# Patient Record
Sex: FEMALE | Race: WHITE | NOT HISPANIC OR LATINO | Employment: UNEMPLOYED | URBAN - METROPOLITAN AREA
[De-identification: names, ages, dates, MRNs, and addresses within clinical notes are randomized per-mention and may not be internally consistent; named-entity substitution may affect disease eponyms.]

---

## 2023-08-11 ENCOUNTER — HOSPITAL ENCOUNTER (OUTPATIENT)
Dept: DATA CONVERSION | Facility: HOSPITAL | Age: 88
Discharge: HOME | End: 2023-08-11

## 2023-08-11 DIAGNOSIS — E11.9 TYPE 2 DIABETES MELLITUS WITHOUT COMPLICATIONS (MULTI): ICD-10-CM

## 2023-08-11 DIAGNOSIS — M25.551 PAIN IN RIGHT HIP: ICD-10-CM

## 2023-08-11 DIAGNOSIS — K52.9 NONINFECTIVE GASTROENTERITIS AND COLITIS, UNSPECIFIED: ICD-10-CM

## 2023-08-11 DIAGNOSIS — E78.5 HYPERLIPIDEMIA, UNSPECIFIED: ICD-10-CM

## 2023-08-11 DIAGNOSIS — M19.90 UNSPECIFIED OSTEOARTHRITIS, UNSPECIFIED SITE: ICD-10-CM

## 2023-08-11 DIAGNOSIS — I10 ESSENTIAL (PRIMARY) HYPERTENSION: ICD-10-CM

## 2023-08-11 LAB
ALBUMIN SERPL-MCNC: 3.8 GM/DL (ref 3.5–5)
ALBUMIN/GLOB SERPL: 1.2 RATIO (ref 1.5–3)
ALP BLD-CCNC: 56 U/L (ref 35–125)
ALT SERPL-CCNC: 18 U/L (ref 5–40)
ANION GAP SERPL CALCULATED.3IONS-SCNC: 10 MMOL/L (ref 0–19)
APPEARANCE PLAS: CLEAR
AST SERPL-CCNC: 21 U/L (ref 5–40)
BILIRUB SERPL-MCNC: 0.3 MG/DL (ref 0.1–1.2)
BUN SERPL-MCNC: 22 MG/DL (ref 8–25)
BUN/CREAT SERPL: 27.5 RATIO (ref 8–21)
CALCIUM SERPL-MCNC: 9.3 MG/DL (ref 8.5–10.4)
CHLORIDE SERPL-SCNC: 106 MMOL/L (ref 97–107)
CHOLEST SERPL-MCNC: 189 MG/DL (ref 133–200)
CHOLEST/HDLC SERPL: 3 RATIO
CO2 SERPL-SCNC: 25 MMOL/L (ref 24–31)
COLOR SPUN FLD: YELLOW
CREAT SERPL-MCNC: 0.8 MG/DL (ref 0.4–1.6)
DEPRECATED RDW RBC AUTO: 58 FL (ref 37–54)
ERYTHROCYTE [DISTWIDTH] IN BLOOD BY AUTOMATED COUNT: 15.8 % (ref 11.7–15)
FASTING STATUS PATIENT QL REPORTED: NORMAL
GFR SERPL CREATININE-BSD FRML MDRD: 72 ML/MIN/1.73 M2
GLOBULIN SER-MCNC: 3.1 G/DL (ref 1.9–3.7)
GLUCOSE SERPL-MCNC: 138 MG/DL (ref 65–99)
HBA1C MFR BLD: 5.8 % (ref 4–6)
HCT VFR BLD AUTO: 37.6 % (ref 36–44)
HDLC SERPL-MCNC: 63 MG/DL
HGB BLD-MCNC: 12 GM/DL (ref 12–15)
LDLC SERPL CALC-MCNC: 103 MG/DL (ref 65–130)
MCH RBC QN AUTO: 31.7 PG (ref 26–34)
MCHC RBC AUTO-ENTMCNC: 31.9 % (ref 31–37)
MCV RBC AUTO: 99.2 FL (ref 80–100)
NRBC BLD-RTO: 0 /100 WBC
PLATELET # BLD AUTO: 149 K/UL (ref 150–450)
PMV BLD AUTO: 10 CU (ref 7–12.6)
POTASSIUM SERPL-SCNC: 4.6 MMOL/L (ref 3.4–5.1)
PROT SERPL-MCNC: 6.9 G/DL (ref 5.9–7.9)
RBC # BLD AUTO: 3.79 M/UL (ref 4–4.9)
SODIUM SERPL-SCNC: 141 MMOL/L (ref 133–145)
TRIGL SERPL-MCNC: 113 MG/DL (ref 40–150)
TSH SERPL DL<=0.05 MIU/L-ACNC: 3.14 MIU/L (ref 0.27–4.2)
WBC # BLD AUTO: 4.9 K/UL (ref 4.5–11)

## 2023-08-30 ENCOUNTER — HOSPITAL ENCOUNTER (OUTPATIENT)
Dept: DATA CONVERSION | Facility: HOSPITAL | Age: 88
Discharge: HOME | End: 2023-08-30

## 2023-08-30 DIAGNOSIS — T14.8XXA OTHER INJURY OF UNSPECIFIED BODY REGION, INITIAL ENCOUNTER: ICD-10-CM

## 2023-09-01 ENCOUNTER — HOSPITAL ENCOUNTER (OUTPATIENT)
Dept: DATA CONVERSION | Facility: HOSPITAL | Age: 88
Discharge: HOME | End: 2023-09-01
Payer: MEDICARE

## 2023-09-01 DIAGNOSIS — M48.062 SPINAL STENOSIS, LUMBAR REGION WITH NEUROGENIC CLAUDICATION: ICD-10-CM

## 2023-09-01 DIAGNOSIS — M16.11 UNILATERAL PRIMARY OSTEOARTHRITIS, RIGHT HIP: ICD-10-CM

## 2023-09-01 LAB — GLUCOSE BLD STRIP.AUTO-MCNC: 115 MG/DL (ref 65–99)

## 2023-09-15 PROBLEM — G89.29 OTHER CHRONIC PAIN: Status: ACTIVE | Noted: 2023-09-15

## 2023-09-15 PROBLEM — M19.91 PRIMARY OSTEOARTHRITIS: Status: ACTIVE | Noted: 2023-09-15

## 2023-09-15 PROBLEM — K58.0 IRRITABLE BOWEL SYNDROME WITH DIARRHEA: Status: ACTIVE | Noted: 2023-09-15

## 2023-09-15 PROBLEM — M51.369 DDD (DEGENERATIVE DISC DISEASE), LUMBAR: Status: ACTIVE | Noted: 2023-09-15

## 2023-09-15 PROBLEM — M54.40 ACUTE BACK PAIN WITH SCIATICA: Status: ACTIVE | Noted: 2023-09-15

## 2023-09-15 PROBLEM — M1A.00X0 IDIOPATHIC CHRONIC GOUT WITHOUT TOPHUS: Status: ACTIVE | Noted: 2023-09-15

## 2023-09-15 PROBLEM — M43.16 SPONDYLOLISTHESIS AT L3-L4 LEVEL: Status: ACTIVE | Noted: 2023-09-15

## 2023-09-15 PROBLEM — E78.5 HYPERLIPIDEMIA: Status: ACTIVE | Noted: 2023-09-15

## 2023-09-15 PROBLEM — M15.4 EROSIVE OSTEOARTHRITIS OF BOTH HANDS: Status: ACTIVE | Noted: 2023-09-15

## 2023-09-15 PROBLEM — M51.36 DDD (DEGENERATIVE DISC DISEASE), LUMBAR: Status: ACTIVE | Noted: 2023-09-15

## 2023-09-15 PROBLEM — M19.041 PRIMARY OSTEOARTHRITIS, RIGHT HAND: Status: ACTIVE | Noted: 2023-09-15

## 2023-09-15 PROBLEM — L89.152 PRESSURE INJURY OF SACRAL REGION, STAGE 2 (MULTI): Status: ACTIVE | Noted: 2023-09-15

## 2023-09-15 PROBLEM — M06.9 RHEUMATOID ARTHRITIS (MULTI): Status: ACTIVE | Noted: 2023-09-15

## 2023-09-15 PROBLEM — L03.317 CELLULITIS OF BUTTOCK: Status: RESOLVED | Noted: 2023-09-15 | Resolved: 2023-09-15

## 2023-09-15 PROBLEM — E55.9 VITAMIN D DEFICIENCY: Status: ACTIVE | Noted: 2023-09-15

## 2023-09-15 PROBLEM — L03.90 CELLULITIS: Status: ACTIVE | Noted: 2023-09-15

## 2023-09-15 PROBLEM — L89.153: Status: ACTIVE | Noted: 2023-09-15

## 2023-09-15 PROBLEM — E11.9 TYPE 2 DIABETES MELLITUS WITHOUT COMPLICATION, WITHOUT LONG-TERM CURRENT USE OF INSULIN (MULTI): Status: ACTIVE | Noted: 2023-09-15

## 2023-09-15 PROBLEM — M48.062 SPINAL STENOSIS, LUMBAR REGION WITH NEUROGENIC CLAUDICATION: Status: ACTIVE | Noted: 2023-09-15

## 2023-09-15 PROBLEM — M54.31 SCIATICA OF RIGHT SIDE: Status: ACTIVE | Noted: 2023-09-15

## 2023-09-15 PROBLEM — F41.9 ANXIETY: Status: ACTIVE | Noted: 2023-09-15

## 2023-09-15 PROBLEM — M47.812 SPONDYLOSIS WITHOUT MYELOPATHY OR RADICULOPATHY, CERVICAL REGION: Status: ACTIVE | Noted: 2023-09-15

## 2023-09-15 PROBLEM — L03.012 CELLULITIS OF FINGER OF LEFT HAND: Status: RESOLVED | Noted: 2023-09-15 | Resolved: 2023-09-15

## 2023-09-15 PROBLEM — G54.1 LUMBOSACRAL PLEXOPATHY: Status: ACTIVE | Noted: 2023-09-15

## 2023-09-15 PROBLEM — M19.049 HAND ARTHRITIS: Status: ACTIVE | Noted: 2023-09-15

## 2023-09-15 PROBLEM — I10 ESSENTIAL HYPERTENSION: Status: ACTIVE | Noted: 2023-09-15

## 2023-09-15 PROBLEM — F32.9 REACTIVE DEPRESSION: Status: ACTIVE | Noted: 2023-09-15

## 2023-09-15 PROBLEM — M47.817 SPONDYLOSIS WITHOUT MYELOPATHY OR RADICULOPATHY, LUMBOSACRAL REGION: Status: ACTIVE | Noted: 2023-09-15

## 2023-09-15 PROBLEM — E73.9 LACTOSE INTOLERANCE: Status: ACTIVE | Noted: 2023-09-15

## 2023-09-15 PROBLEM — M50.30 DDD (DEGENERATIVE DISC DISEASE), CERVICAL: Status: ACTIVE | Noted: 2023-09-15

## 2023-09-15 PROBLEM — M35.9 SYSTEMIC INVOLVEMENT OF CONNECTIVE TISSUE, UNSPECIFIED (MULTI): Status: ACTIVE | Noted: 2023-09-15

## 2023-09-15 PROBLEM — M46.1 BILATERAL SACROILIITIS (CMS-HCC): Status: ACTIVE | Noted: 2023-09-15

## 2023-09-15 RX ORDER — BUSPIRONE HYDROCHLORIDE 15 MG/1
1 TABLET ORAL 2 TIMES DAILY
COMMUNITY
End: 2023-12-20 | Stop reason: SDUPTHER

## 2023-09-15 RX ORDER — EZETIMIBE 10 MG/1
1 TABLET ORAL DAILY
COMMUNITY
End: 2024-04-15 | Stop reason: SDUPTHER

## 2023-09-15 RX ORDER — LOSARTAN POTASSIUM 50 MG/1
1 TABLET ORAL DAILY
COMMUNITY
End: 2024-04-15 | Stop reason: SDUPTHER

## 2023-09-15 RX ORDER — GLIPIZIDE 5 MG/1
1 TABLET, FILM COATED, EXTENDED RELEASE ORAL DAILY
COMMUNITY

## 2023-09-15 RX ORDER — AMLODIPINE BESYLATE 5 MG/1
1 TABLET ORAL DAILY
COMMUNITY
End: 2024-04-15 | Stop reason: SDUPTHER

## 2023-09-26 PROBLEM — D64.9 CHRONIC ANEMIA: Status: ACTIVE | Noted: 2023-09-26

## 2023-09-26 PROBLEM — M48.061 SPINAL STENOSIS, LUMBAR REGION WITHOUT NEUROGENIC CLAUDICATION: Status: ACTIVE | Noted: 2023-09-26

## 2023-09-26 PROBLEM — R26.2 DIFFICULTY WALKING: Status: ACTIVE | Noted: 2023-09-26

## 2023-09-26 PROBLEM — R76.8 ELEVATED ANTINUCLEAR ANTIBODY (ANA) LEVEL: Status: ACTIVE | Noted: 2023-09-26

## 2023-09-26 PROBLEM — K90.0 ADULT CELIAC DISEASE (HHS-HCC): Status: ACTIVE | Noted: 2023-09-26

## 2023-09-26 PROBLEM — M16.11 PRIMARY OSTEOARTHRITIS OF RIGHT HIP: Status: ACTIVE | Noted: 2023-09-26

## 2023-09-26 PROBLEM — M54.16 RADICULOPATHY, LUMBAR REGION: Status: ACTIVE | Noted: 2023-09-26

## 2023-09-26 PROBLEM — L98.499 NON-PRESSURE CHRONIC ULCER OF SKIN OF OTHER SITES WITH UNSPECIFIED SEVERITY (MULTI): Status: ACTIVE | Noted: 2023-09-26

## 2023-09-26 PROBLEM — M25.551 PAIN IN RIGHT HIP: Status: ACTIVE | Noted: 2023-09-26

## 2023-09-26 PROBLEM — Z86.19 HISTORY OF RECURRENT INFECTION: Status: ACTIVE | Noted: 2023-09-26

## 2023-09-26 PROBLEM — D80.1 HYPOGAMMAGLOBULINEMIA (MULTI): Status: ACTIVE | Noted: 2023-09-26

## 2023-09-26 RX ORDER — ASPIRIN 81 MG/1
81 TABLET ORAL DAILY
COMMUNITY
End: 2023-10-12 | Stop reason: ALTCHOICE

## 2023-09-26 RX ORDER — ELECTROLYTES/DEXTROSE
5 SOLUTION, ORAL ORAL DAILY
COMMUNITY

## 2023-09-26 RX ORDER — MULTIVIT,IRON,MINERALS/LUTEIN
TABLET ORAL
COMMUNITY
End: 2023-10-12 | Stop reason: ALTCHOICE

## 2023-09-26 RX ORDER — SULFAMETHOXAZOLE AND TRIMETHOPRIM 800; 160 MG/1; MG/1
1 TABLET ORAL EVERY 12 HOURS
COMMUNITY
Start: 2023-08-24 | End: 2023-10-12 | Stop reason: ALTCHOICE

## 2023-09-26 RX ORDER — LACTOBACILLUS RHAMNOSUS GG 10B CELL
1 CAPSULE ORAL DAILY
COMMUNITY

## 2023-09-26 RX ORDER — ACETAMINOPHEN 500 MG
50 TABLET ORAL DAILY
COMMUNITY

## 2023-09-27 DIAGNOSIS — M25.551 RIGHT HIP PAIN: ICD-10-CM

## 2023-09-27 DIAGNOSIS — R26.89 OTHER ABNORMALITIES OF GAIT AND MOBILITY: ICD-10-CM

## 2023-09-27 DIAGNOSIS — M54.50 LOW BACK PAIN, UNSPECIFIED BACK PAIN LATERALITY, UNSPECIFIED CHRONICITY, UNSPECIFIED WHETHER SCIATICA PRESENT: Primary | ICD-10-CM

## 2023-09-29 ENCOUNTER — HOSPITAL ENCOUNTER (OUTPATIENT)
Dept: DATA CONVERSION | Facility: HOSPITAL | Age: 88
Discharge: HOME | End: 2023-09-29
Payer: MEDICARE

## 2023-09-29 DIAGNOSIS — R26.2 DIFFICULTY WALKING: ICD-10-CM

## 2023-09-29 DIAGNOSIS — M51.36 DDD (DEGENERATIVE DISC DISEASE), LUMBAR: ICD-10-CM

## 2023-09-29 DIAGNOSIS — M16.11 PRIMARY OSTEOARTHRITIS OF RIGHT HIP: ICD-10-CM

## 2023-09-29 DIAGNOSIS — M48.062 SPINAL STENOSIS, LUMBAR REGION WITH NEUROGENIC CLAUDICATION: Primary | ICD-10-CM

## 2023-10-04 ENCOUNTER — APPOINTMENT (OUTPATIENT)
Dept: PHYSICAL THERAPY | Facility: CLINIC | Age: 88
End: 2023-10-04
Payer: MEDICARE

## 2023-10-04 ENCOUNTER — OFFICE VISIT (OUTPATIENT)
Dept: RHEUMATOLOGY | Facility: CLINIC | Age: 88
End: 2023-10-04
Payer: MEDICARE

## 2023-10-04 VITALS
HEART RATE: 100 BPM | BODY MASS INDEX: 23.95 KG/M2 | WEIGHT: 149 LBS | HEIGHT: 66 IN | DIASTOLIC BLOOD PRESSURE: 80 MMHG | SYSTOLIC BLOOD PRESSURE: 148 MMHG

## 2023-10-04 DIAGNOSIS — M70.61 GREATER TROCHANTERIC BURSITIS OF RIGHT HIP: ICD-10-CM

## 2023-10-04 DIAGNOSIS — M62.838 MUSCLE SPASM OF RIGHT LOWER EXTREMITY: Primary | ICD-10-CM

## 2023-10-04 PROCEDURE — 99214 OFFICE O/P EST MOD 30 MIN: CPT | Performed by: INTERNAL MEDICINE

## 2023-10-04 PROCEDURE — 2500000004 HC RX 250 GENERAL PHARMACY W/ HCPCS (ALT 636 FOR OP/ED): Performed by: INTERNAL MEDICINE

## 2023-10-04 PROCEDURE — 96372 THER/PROPH/DIAG INJ SC/IM: CPT | Performed by: INTERNAL MEDICINE

## 2023-10-04 PROCEDURE — 3079F DIAST BP 80-89 MM HG: CPT | Performed by: INTERNAL MEDICINE

## 2023-10-04 PROCEDURE — 1159F MED LIST DOCD IN RCRD: CPT | Performed by: INTERNAL MEDICINE

## 2023-10-04 PROCEDURE — 1036F TOBACCO NON-USER: CPT | Performed by: INTERNAL MEDICINE

## 2023-10-04 PROCEDURE — 3077F SYST BP >= 140 MM HG: CPT | Performed by: INTERNAL MEDICINE

## 2023-10-04 PROCEDURE — 2500000005 HC RX 250 GENERAL PHARMACY W/O HCPCS: Performed by: INTERNAL MEDICINE

## 2023-10-04 PROCEDURE — 1125F AMNT PAIN NOTED PAIN PRSNT: CPT | Performed by: INTERNAL MEDICINE

## 2023-10-04 RX ORDER — BUPIVACAINE HYDROCHLORIDE 5 MG/ML
5 INJECTION, SOLUTION PERINEURAL ONCE
Status: COMPLETED | OUTPATIENT
Start: 2023-10-04 | End: 2023-10-04

## 2023-10-04 RX ORDER — TIZANIDINE 2 MG/1
2 TABLET ORAL AS NEEDED
Qty: 7 TABLET | Refills: 1 | Status: SHIPPED | OUTPATIENT
Start: 2023-10-04 | End: 2023-10-12 | Stop reason: ALTCHOICE

## 2023-10-04 RX ORDER — METHYLPREDNISOLONE ACETATE 40 MG/ML
40 INJECTION, SUSPENSION INTRA-ARTICULAR; INTRALESIONAL; INTRAMUSCULAR; SOFT TISSUE ONCE
Status: COMPLETED | OUTPATIENT
Start: 2023-10-04 | End: 2023-10-04

## 2023-10-04 RX ORDER — METHYLPREDNISOLONE ACETATE 40 MG/ML
INJECTION, SUSPENSION INTRA-ARTICULAR; INTRALESIONAL; INTRAMUSCULAR; SOFT TISSUE
Qty: 1 ML | Refills: 0 | Status: SHIPPED | OUTPATIENT
Start: 2023-10-04 | End: 2023-10-12 | Stop reason: ALTCHOICE

## 2023-10-04 RX ADMIN — METHYLPREDNISOLONE ACETATE 40 MG: 40 INJECTION, SUSPENSION INTRA-ARTICULAR; INTRALESIONAL; INTRAMUSCULAR; SOFT TISSUE at 15:37

## 2023-10-04 RX ADMIN — BUPIVACAINE HYDROCHLORIDE 25 MG: 5 INJECTION, SOLUTION PERINEURAL at 11:00

## 2023-10-04 ASSESSMENT — ENCOUNTER SYMPTOMS
RESPIRATORY NEGATIVE: 1
BACK PAIN: 1
FLANK PAIN: 0
HEMATURIA: 0
LIGHT-HEADEDNESS: 0
PSYCHIATRIC NEGATIVE: 1
DEPRESSION: 0
EYES NEGATIVE: 1
LOSS OF SENSATION IN FEET: 0
CARDIOVASCULAR NEGATIVE: 1
GASTROINTESTINAL NEGATIVE: 1
HEADACHES: 0
DIZZINESS: 0
NUMBNESS: 0
DIFFICULTY URINATING: 0
HEMATOLOGIC/LYMPHATIC NEGATIVE: 1
DYSURIA: 0
ACTIVITY CHANGE: 1
ARTHRALGIAS: 1
ENDOCRINE NEGATIVE: 1
OCCASIONAL FEELINGS OF UNSTEADINESS: 0

## 2023-10-04 ASSESSMENT — PAIN SCALES - GENERAL
PAINLEVEL: 8
PAINLEVEL_OUTOF10: 6

## 2023-10-04 ASSESSMENT — PATIENT HEALTH QUESTIONNAIRE - PHQ9
1. LITTLE INTEREST OR PLEASURE IN DOING THINGS: NOT AT ALL
2. FEELING DOWN, DEPRESSED OR HOPELESS: NOT AT ALL
SUM OF ALL RESPONSES TO PHQ9 QUESTIONS 1 AND 2: 0

## 2023-10-04 NOTE — PROGRESS NOTES
"Subjective   This 87 yo female presents with acute right lateral hip region pain of several days duration.    HPI  Prob. #1: Right hip region pain       The patient had the acute onset of right lateral hip region pain about one week ago.       The patient had severe hip region pain approximating ~7-8/10, and characterized as \"excruciating\" to the extent that she was unable to stand and bear weight. Finally, after using APAP + ADVIL, she gained some modest relief so that she could stand and walk holding onto furniture and walls. Later this week, the patient and her daughter purchased a walker, which has provided stability and allowed the patient to bend over the walker to ambulate stably.         Today, the patient reports having moderately severe (7/10) pain over the right posterolateral hip region. There is radiation down the anteromedial aspect of the right leg and, sometimes, pain into the groin.          The patient asks what can be done today to help relieve her severe pain.    Review of Systems   Constitutional:  Positive for activity change.   HENT: Negative.     Eyes: Negative.    Respiratory: Negative.     Cardiovascular: Negative.    Gastrointestinal: Negative.    Endocrine: Negative.    Genitourinary:  Negative for difficulty urinating, dysuria, flank pain, hematuria and urgency.   Musculoskeletal:  Positive for arthralgias, back pain and gait problem.   Skin: Negative.    Neurological:  Negative for dizziness, light-headedness, numbness and headaches.   Hematological: Negative.    Psychiatric/Behavioral: Negative.         Objective   Physical Exam  Constitutional:       General: She is in acute distress.      Appearance: Normal appearance. She is obese.   Musculoskeletal:      Comments: Locomotor exam: Hands: advanced OA, especially DIP Herberdens; no Bleem's nodes; full ROM of MCPs and PIPs, good grasp, and no tenderness to palpation of MCPs or PIPs. Wrists, elbows, shoulders, knees, and ankles " intact w/o definite evidence of OA. Feet: advanced bilateral hallux valgus w/hammer toes. There is exquisite pain over the right lateral trochanteric bursa with light palpation, consistent with an acute bursitis. There is no iliotibial band tenderness. Specific mobility of the right hip did induce some pain with internal rotation; flexion/extension did not induce pain.    Neurological:      Mental Status: She is alert.         Assessment/Plan   Acute trochanteric bursitis  Right hip pain  Difficulty in walking     Follow-up: 1. Apply heat to bursal region BID x 30 min x 2 days; 2. Follow-up with Dr. Ann for management of pain from lumbar stenosis; 3. TIZANIDINE 2 mg po hs x 7 days for pain and muscle spasms. The indications, risks/rewards, and potential adverse effects have been explained to the patient.She agrees to treatment.

## 2023-10-12 ENCOUNTER — TELEMEDICINE (OUTPATIENT)
Dept: PRIMARY CARE | Facility: CLINIC | Age: 88
End: 2023-10-12
Payer: MEDICARE

## 2023-10-12 DIAGNOSIS — J00 COMMON COLD: Primary | ICD-10-CM

## 2023-10-12 PROCEDURE — 99213 OFFICE O/P EST LOW 20 MIN: CPT | Mod: 95 | Performed by: NURSE PRACTITIONER

## 2023-10-12 PROCEDURE — 99213 OFFICE O/P EST LOW 20 MIN: CPT | Performed by: NURSE PRACTITIONER

## 2023-10-12 RX ORDER — LOPERAMIDE HCL 2 MG
1 TABLET ORAL AS NEEDED
COMMUNITY
Start: 2021-04-29

## 2023-10-12 RX ORDER — VIT A/VIT C/VIT E/ZINC/COPPER 4296-226
1 CAPSULE ORAL 2 TIMES DAILY
COMMUNITY

## 2023-10-12 RX ORDER — FLUOCINONIDE TOPICAL SOLUTION USP, 0.05% 0.5 MG/ML
1 SOLUTION TOPICAL AS NEEDED
COMMUNITY
Start: 2022-10-31

## 2023-10-12 RX ORDER — BENZONATATE 100 MG/1
100 CAPSULE ORAL 3 TIMES DAILY PRN
Qty: 30 CAPSULE | Refills: 0 | Status: SHIPPED | OUTPATIENT
Start: 2023-10-12 | End: 2023-10-22

## 2023-10-12 ASSESSMENT — PAIN SCALES - GENERAL: PAINLEVEL: 9

## 2023-10-12 NOTE — PROGRESS NOTES
Subjective   Patient ID: Estrella Villafana is a 88 y.o. female who presents for URI (C/O Cold symptoms; C/o ongoing cough. States cough started yesterday morning and is worsening ).    URI    With patient's permission, this is a Telemedicine visit with audio. The provider, and patient participated in this telemedicine encounter.    Chase is an 88 year old female who started feeling ill yesterday morning, has a cold and cough.  Having trouble sleeping because of the cough.  Post nasal drip, no fever. She is taking two advil three times a day.      Well developed well nourished female, Alert and oriented x 3, judgement and insight intact, able to speak in full sentences, non labored breathing, no joint swelling, normal ROM of all extremities, speech clear, face symmetrical, no rash visible.       Review of Systems    Objective   There were no vitals taken for this visit.    Physical Exam    Assessment/Plan   Diagnoses and all orders for this visit:  Common cold  -     benzonatate (Tessalon) 100 mg capsule; Take 1 capsule (100 mg) by mouth 3 times a day as needed for cough for up to 10 days. Do not crush or chew.

## 2023-10-12 NOTE — PATIENT INSTRUCTIONS
Because of the length of your illness, I have determined you have a viral infection known as a common cold, flu, or COVID  You can use Advil cold and sinus, Tylenol cold and sinus, or Sudafed 12 hour (all must be purchased from the pharmacist)  If you have high blood pressure use Coricidin HBP  Vicks vapor rub  Humidifier at night  Saline nasal spray for stuffy nose  Cepacol spray or lozenges for sore throat  If you are still sick after ten days call for an antibiotic.

## 2023-10-13 ENCOUNTER — APPOINTMENT (OUTPATIENT)
Dept: PHYSICAL THERAPY | Facility: CLINIC | Age: 88
End: 2023-10-13
Payer: MEDICARE

## 2023-10-17 ENCOUNTER — CLINICAL SUPPORT (OUTPATIENT)
Dept: PRIMARY CARE | Facility: CLINIC | Age: 88
End: 2023-10-17
Payer: MEDICARE

## 2023-10-17 DIAGNOSIS — Z23 ENCOUNTER FOR IMMUNIZATION: Primary | ICD-10-CM

## 2023-10-17 PROCEDURE — G0008 ADMIN INFLUENZA VIRUS VAC: HCPCS | Performed by: FAMILY MEDICINE

## 2023-10-17 NOTE — PROGRESS NOTES
Dr Hamilton if you can please sign order for this flu shot, pt was here for a nurse visit, thank you.

## 2023-10-18 ENCOUNTER — TELEPHONE (OUTPATIENT)
Dept: PRIMARY CARE | Facility: CLINIC | Age: 88
End: 2023-10-18
Payer: MEDICARE

## 2023-10-18 ENCOUNTER — APPOINTMENT (OUTPATIENT)
Dept: PHYSICAL THERAPY | Facility: CLINIC | Age: 88
End: 2023-10-18
Payer: MEDICARE

## 2023-10-18 NOTE — TELEPHONE ENCOUNTER
Results from [facility]-Clinical[facility]:  Delisa Wen MA 09/29/2023 11:28:50 AM EDT > pt called in stating that she is having a lot of pain in right hip groin area, states that she thinks it has inflammation in there, she wanted to know if its safe and ok for her to take tumeric? Pt aslo states her rheumatologist prescribed her tylenol 600 mg of that 4 a day and states that doesnt really help at all so she wanted to know if you had other options. Please advise  Delisa Wen MA 09/29/2023 11:29:40 AM EDT > states pain management gave her a injection in the hip a month ago and that didnt help.  JAM UGARTE 09/29/2023 12:37:25 PM EDT > it is likely safe for patient to try turmeric to see if this helps.  As for additional options, I would see if Dr. Hall of rheumatology has any recommendations as patient is scheduled to see him on October 4.  If patient did not have significant symptom improvement following steroid injection, would also recommend relaying this to pain management so they can optimize other potential therapy options  Viviana Ibrahim MA 09/29/2023 01:48:25 PM EDT > Attempted to call but got busy signal

## 2023-10-20 ENCOUNTER — TREATMENT (OUTPATIENT)
Dept: PHYSICAL THERAPY | Facility: CLINIC | Age: 88
End: 2023-10-20
Payer: MEDICARE

## 2023-10-20 ENCOUNTER — APPOINTMENT (OUTPATIENT)
Dept: PHYSICAL THERAPY | Facility: CLINIC | Age: 88
End: 2023-10-20
Payer: MEDICARE

## 2023-10-20 DIAGNOSIS — R26.89 OTHER ABNORMALITIES OF GAIT AND MOBILITY: ICD-10-CM

## 2023-10-20 DIAGNOSIS — M25.551 RIGHT HIP PAIN: ICD-10-CM

## 2023-10-20 DIAGNOSIS — M54.50 LOW BACK PAIN, UNSPECIFIED BACK PAIN LATERALITY, UNSPECIFIED CHRONICITY, UNSPECIFIED WHETHER SCIATICA PRESENT: ICD-10-CM

## 2023-10-20 PROCEDURE — 97110 THERAPEUTIC EXERCISES: CPT | Mod: GP | Performed by: PHYSICAL THERAPIST

## 2023-10-20 PROCEDURE — 97140 MANUAL THERAPY 1/> REGIONS: CPT | Mod: GP | Performed by: PHYSICAL THERAPIST

## 2023-10-20 PROCEDURE — 97116 GAIT TRAINING THERAPY: CPT | Mod: GP | Performed by: PHYSICAL THERAPIST

## 2023-10-20 NOTE — PROGRESS NOTES
Physical Therapy    Physical Therapy Treatment    Patient Name: Estrella Villafana  MRN: [id number]  Today's Date: 10/20/2023    Time Calculation  Start Time: 1250  Stop Time: 1340  Time Calculation (min): 50 min    Visit #: 4 out of 8  Evaluation date: 9/20/23    Current Problem:   1. Low back pain, unspecified back pain laterality, unspecified chronicity, unspecified whether sciatica present  PT eval and treat      2. Right hip pain  PT eval and treat      3. Other abnormalities of gait and mobility  PT eval and treat          ASSESSMENT:   The patient continues to voice right low back and right hip pain into her LE.  She has obvious gait deformity due to the pain.  She would have improved gait quality with device but she declines using a cane or walker.       PLAN:  Continue per plan of care.    SUBJECTIVE:   The patient reports that her pain level has not eased since starting physical therapy services.  She continues to be limited due to right sided hip and LE pain.  She is unable to walk longer distances due to increased pain.     Precautions: None     Pain:   Start of session: 6/10  Post session: 6/10     OBJECTIVE:    Flexed posturing in stance.  Cues for correction, but patient declines use of device and posture correction will be quite difficult for her within UE support.    Treatments:  Therapeutic Exercise: (10 minutes)  Clamshell 3 x 10 reps.  Piriformis Stretching 4 x 20 seconds.  Seated Hamstrings stretching 4 x 20 seconds.    Manual Therapy: (25 minutes)  STM to lumbar paraspinals and right Glutes.  Right Leg pull 3 x 2 minute hold.    Gait Training: (10 minutes)  Trialed use of quad cane and walker for gait.  Noted better gait quality with devices.  Recommendation from PT was for patient to use a device to take pressure off of right hip joint.  Pt not very receptive to use.    HEP:  Review of HEP>    Goals:   Physical Therapy Goals  Improve standing posture to neutral alignment with no pain  Improve  walking tolerance to 10+ minutes  Improve sleeping tolerance   LEFS > 52

## 2023-10-24 NOTE — TELEPHONE ENCOUNTER
Received message though I am unclear what question is being asked.  If patient is continuing to have significant hip pain following her injection with pain management.  I would recommend that she be in contact with her office to discuss other therapeutic options.  If they do not feel that they are able to offer an alternative course of action, can coordinate for consultation with alternative pain management or orthopedics if specifically related to hip.

## 2023-10-27 ENCOUNTER — TREATMENT (OUTPATIENT)
Dept: PHYSICAL THERAPY | Facility: CLINIC | Age: 88
End: 2023-10-27
Payer: MEDICARE

## 2023-10-27 ENCOUNTER — APPOINTMENT (OUTPATIENT)
Dept: PHYSICAL THERAPY | Facility: CLINIC | Age: 88
End: 2023-10-27
Payer: MEDICARE

## 2023-10-27 DIAGNOSIS — R26.89 OTHER ABNORMALITIES OF GAIT AND MOBILITY: ICD-10-CM

## 2023-10-27 DIAGNOSIS — M25.551 RIGHT HIP PAIN: ICD-10-CM

## 2023-10-27 DIAGNOSIS — M54.50 LOW BACK PAIN, UNSPECIFIED BACK PAIN LATERALITY, UNSPECIFIED CHRONICITY, UNSPECIFIED WHETHER SCIATICA PRESENT: ICD-10-CM

## 2023-10-27 PROCEDURE — 97110 THERAPEUTIC EXERCISES: CPT | Mod: GP | Performed by: PHYSICAL THERAPIST

## 2023-10-27 PROCEDURE — 97140 MANUAL THERAPY 1/> REGIONS: CPT | Mod: GP | Performed by: PHYSICAL THERAPIST

## 2023-10-27 NOTE — PROGRESS NOTES
"Physical Therapy    Physical Therapy Treatment    Patient Name: Estrella Villafana  MRN: [id number]  Today's Date: 10/27/2023      Visit #: 5 out of 8  Evaluation date: 9/20/23    Current Problem:   1. Low back pain, unspecified back pain laterality, unspecified chronicity, unspecified whether sciatica present  PT eval and treat      2. Right hip pain  PT eval and treat      3. Other abnormalities of gait and mobility  PT eval and treat          SUBJECTIVE:   The patient reports that her pain level was very high yesterday.  \"The pain was about 9/10 yesterday\".  \"It feels a little better today.\"  Pt will be going to see Dr. Ann next Thursday.     Precautions: None     Pain:   Start of session: 6/10  Post session: 4/10--Pt notes more muscle soreness after session.  \"It does not hurt as much, it's more in the muscle.\"     OBJECTIVE:      Treatments:  Cold Pack to lumbar region during supine exercises.    Therapeutic Exercise: (30 minutes)  Clamshell 3 x 10 reps.  Piriformis Stretching 4 x 20 seconds.  Seated Hamstrings stretching 4 x 20 seconds.  Hooklying hip adduction ball squeeze 3 x 10 reps.  Right Hip ER PROM unilateral.  Hip abduction.    Manual Therapy: (15 minutes)  Right Leg pull 6 x 2 minute hold.    HEP:  Review of HEP.       ASSESSMENT:   The patient continues to voice right low back and right hip pain into her LE.  She has obvious gait deformity due to the pain.  She would have improved gait quality with device but she declines using a cane or walker.       PLAN:  Continue per plan of care.  "

## 2023-11-01 ENCOUNTER — APPOINTMENT (OUTPATIENT)
Dept: PHYSICAL THERAPY | Facility: CLINIC | Age: 88
End: 2023-11-01
Payer: MEDICARE

## 2023-11-02 ENCOUNTER — OFFICE VISIT (OUTPATIENT)
Dept: PAIN MEDICINE | Facility: CLINIC | Age: 88
End: 2023-11-02
Payer: MEDICARE

## 2023-11-02 VITALS
SYSTOLIC BLOOD PRESSURE: 144 MMHG | BODY MASS INDEX: 23.95 KG/M2 | WEIGHT: 149 LBS | RESPIRATION RATE: 16 BRPM | DIASTOLIC BLOOD PRESSURE: 72 MMHG | HEIGHT: 66 IN

## 2023-11-02 DIAGNOSIS — G89.29 OTHER CHRONIC PAIN: ICD-10-CM

## 2023-11-02 DIAGNOSIS — M16.11 PRIMARY OSTEOARTHRITIS OF RIGHT HIP: ICD-10-CM

## 2023-11-02 DIAGNOSIS — M48.062 NEUROGENIC CLAUDICATION DUE TO LUMBAR SPINAL STENOSIS: Primary | ICD-10-CM

## 2023-11-02 PROBLEM — E78.2 MIXED HYPERLIPIDEMIA: Status: ACTIVE | Noted: 2021-07-02

## 2023-11-02 PROBLEM — E11.9 TYPE 2 DIABETES MELLITUS WITHOUT COMPLICATION, WITHOUT LONG-TERM CURRENT USE OF INSULIN (MULTI): Status: ACTIVE | Noted: 2021-02-25

## 2023-11-02 PROBLEM — M47.16 OSTEOARTHRITIS OF LUMBAR SPINE WITH MYELOPATHY: Status: ACTIVE | Noted: 2018-08-24

## 2023-11-02 PROBLEM — J31.0 CHRONIC RHINITIS: Status: ACTIVE | Noted: 2021-02-12

## 2023-11-02 PROBLEM — M46.1 SACROILIITIS, NOT ELSEWHERE CLASSIFIED (CMS-HCC): Status: ACTIVE | Noted: 2023-11-02

## 2023-11-02 PROBLEM — R29.818 NEUROGENIC CLAUDICATION: Status: ACTIVE | Noted: 2021-04-29

## 2023-11-02 PROBLEM — M35.9 COLLAGEN DISEASE (MULTI): Status: ACTIVE | Noted: 2021-04-29

## 2023-11-02 PROBLEM — Z86.79 HISTORY OF RHEUMATIC FEVER AS A CHILD: Status: ACTIVE | Noted: 2023-11-02

## 2023-11-02 PROBLEM — M06.4 INFLAMMATORY POLYARTHRITIS (MULTI): Status: ACTIVE | Noted: 2021-04-29

## 2023-11-02 PROBLEM — M19.041 PRIMARY OSTEOARTHRITIS OF BOTH HANDS: Status: ACTIVE | Noted: 2019-01-11

## 2023-11-02 PROBLEM — M48.061 SPINAL STENOSIS AT L4-L5 LEVEL: Status: ACTIVE | Noted: 2021-05-06

## 2023-11-02 PROBLEM — M17.12 OSTEOARTHRITIS OF LEFT KNEE: Status: ACTIVE | Noted: 2021-04-29

## 2023-11-02 PROBLEM — M19.042 PRIMARY OSTEOARTHRITIS OF BOTH HANDS: Status: ACTIVE | Noted: 2019-01-11

## 2023-11-02 PROCEDURE — 99214 OFFICE O/P EST MOD 30 MIN: CPT | Performed by: ANESTHESIOLOGY

## 2023-11-02 PROCEDURE — 1159F MED LIST DOCD IN RCRD: CPT | Performed by: ANESTHESIOLOGY

## 2023-11-02 PROCEDURE — 3077F SYST BP >= 140 MM HG: CPT | Performed by: ANESTHESIOLOGY

## 2023-11-02 PROCEDURE — 3078F DIAST BP <80 MM HG: CPT | Performed by: ANESTHESIOLOGY

## 2023-11-02 PROCEDURE — 1036F TOBACCO NON-USER: CPT | Performed by: ANESTHESIOLOGY

## 2023-11-02 PROCEDURE — 1125F AMNT PAIN NOTED PAIN PRSNT: CPT | Performed by: ANESTHESIOLOGY

## 2023-11-02 RX ORDER — DEXTROMETHORPHAN HYDROBROMIDE, GUAIFENESIN 5; 100 MG/5ML; MG/5ML
LIQUID ORAL
COMMUNITY
End: 2024-03-05 | Stop reason: SDUPTHER

## 2023-11-02 RX ORDER — GABAPENTIN 100 MG/1
CAPSULE ORAL
Qty: 46 CAPSULE | Refills: 0 | Status: SHIPPED | OUTPATIENT
Start: 2023-11-02 | End: 2024-04-15 | Stop reason: ALTCHOICE

## 2023-11-02 RX ORDER — ASPIRIN 81 MG/1
TABLET ORAL EVERY 24 HOURS
COMMUNITY
End: 2024-01-26 | Stop reason: ALTCHOICE

## 2023-11-02 ASSESSMENT — ENCOUNTER SYMPTOMS
HEMATOLOGIC/LYMPHATIC NEGATIVE: 1
WEAKNESS: 1
MYALGIAS: 1
PSYCHIATRIC NEGATIVE: 1
RESPIRATORY NEGATIVE: 1
ALLERGIC/IMMUNOLOGIC NEGATIVE: 1
ENDOCRINE NEGATIVE: 1
CARDIOVASCULAR NEGATIVE: 1
CONSTITUTIONAL NEGATIVE: 1
EYES NEGATIVE: 1
ARTHRALGIAS: 1
BACK PAIN: 1
GASTROINTESTINAL NEGATIVE: 1

## 2023-11-02 ASSESSMENT — PATIENT HEALTH QUESTIONNAIRE - PHQ9
2. FEELING DOWN, DEPRESSED OR HOPELESS: NOT AT ALL
SUM OF ALL RESPONSES TO PHQ9 QUESTIONS 1 AND 2: 0
1. LITTLE INTEREST OR PLEASURE IN DOING THINGS: NOT AT ALL

## 2023-11-02 ASSESSMENT — PAIN DESCRIPTION - DESCRIPTORS: DESCRIPTORS: TINGLING

## 2023-11-02 ASSESSMENT — PAIN SCALES - GENERAL
PAINLEVEL: 7
PAINLEVEL_OUTOF10: 7

## 2023-11-02 ASSESSMENT — LIFESTYLE VARIABLES: TOTAL SCORE: 0

## 2023-11-02 ASSESSMENT — PAIN - FUNCTIONAL ASSESSMENT: PAIN_FUNCTIONAL_ASSESSMENT: 0-10

## 2023-11-02 NOTE — PROGRESS NOTES
Subjective   Patient ID: Estrella Villafana is a 88 y.o. female who presents for Back Pain.  Back Pain  Associated symptoms include weakness.   Patient here today to go over her back and right hip pain.  She has been working with physical therapy which has been excellent for her.  However she when she stands to walk and weightbears on the right hip she will have severe pain that radiates around the hip into the groin.  She underwent a hip injection which was not helpful for her.  She was talking with one of her other physicians with suggested starting gabapentin.  She suffers with severe spinal stenosis as well as moderate to severe osteoarthritis of the right hip.  She is started to use a cane to ambulate for safety.  She would like to know what options are available for her.    Review of Systems   Constitutional: Negative.    HENT: Negative.     Eyes: Negative.    Respiratory: Negative.     Cardiovascular: Negative.    Gastrointestinal: Negative.    Endocrine: Negative.    Genitourinary: Negative.    Musculoskeletal:  Positive for arthralgias, back pain and myalgias.   Skin: Negative.    Allergic/Immunologic: Negative.    Neurological:  Positive for weakness.   Hematological: Negative.    Psychiatric/Behavioral: Negative.         Objective   Physical Exam  Vitals and nursing note reviewed.   Constitutional:       Appearance: Normal appearance.   HENT:      Head: Normocephalic and atraumatic.      Right Ear: Ear canal and external ear normal.      Left Ear: Ear canal and external ear normal.      Nose: Nose normal.      Mouth/Throat:      Mouth: Mucous membranes are moist.      Pharynx: Oropharynx is clear.   Eyes:      Conjunctiva/sclera: Conjunctivae normal.      Pupils: Pupils are equal, round, and reactive to light.   Cardiovascular:      Rate and Rhythm: Normal rate.   Pulmonary:      Effort: Pulmonary effort is normal. No respiratory distress.   Musculoskeletal:      Cervical back: Normal range of motion and neck  supple.      Lumbar back: Deformity (kyphotic) and tenderness present. Decreased range of motion. Negative left straight leg raise test. No scoliosis.      Right hip: Crepitus present. Decreased range of motion.   Skin:     General: Skin is warm and dry.   Neurological:      Mental Status: She is alert.      Motor: Motor function is intact.      Coordination: Coordination is intact.      Gait: Gait abnormal (lumbar flexed).   Psychiatric:         Mood and Affect: Mood normal.         Thought Content: Thought content normal.         Assessment/Plan   Problem List Items Addressed This Visit             ICD-10-CM       Musculoskeletal and Injuries    Primary osteoarthritis of right hip M16.11       Neuro    Other chronic pain G89.29     Other Visit Diagnoses         Codes    Neurogenic claudication due to lumbar spinal stenosis    -  Primary M48.062    Relevant Medications    gabapentin (Neurontin) 100 mg capsule             I nice discussion with the patient today our plan will be as follows.    Radiology: No new imaging to review    Physically:  patient to continue with PT.  Continue use of walker and cane.    Psychologically:  No issues    Medication: I will start the patient on Gabapentin 100 mg at bedtime for 2 weeks then move to BID dosing.  [facility] report was appropriate  Patient was educated about the risks, benefits, and alternatives of the medication.      Duration:  > 1 year     Intervention:  We did discuss a referral to orthopedics but she is not interested at this time and would like to try medication.

## 2023-11-03 ENCOUNTER — APPOINTMENT (OUTPATIENT)
Dept: PHYSICAL THERAPY | Facility: CLINIC | Age: 88
End: 2023-11-03
Payer: MEDICARE

## 2023-11-03 ENCOUNTER — TREATMENT (OUTPATIENT)
Dept: PHYSICAL THERAPY | Facility: CLINIC | Age: 88
End: 2023-11-03
Payer: MEDICARE

## 2023-11-03 DIAGNOSIS — R26.89 OTHER ABNORMALITIES OF GAIT AND MOBILITY: ICD-10-CM

## 2023-11-03 DIAGNOSIS — M25.551 RIGHT HIP PAIN: ICD-10-CM

## 2023-11-03 DIAGNOSIS — M54.50 LOW BACK PAIN, UNSPECIFIED BACK PAIN LATERALITY, UNSPECIFIED CHRONICITY, UNSPECIFIED WHETHER SCIATICA PRESENT: ICD-10-CM

## 2023-11-03 PROCEDURE — 97110 THERAPEUTIC EXERCISES: CPT | Mod: GP | Performed by: PHYSICAL THERAPIST

## 2023-11-03 PROCEDURE — 97140 MANUAL THERAPY 1/> REGIONS: CPT | Mod: GP | Performed by: PHYSICAL THERAPIST

## 2023-11-03 PROCEDURE — 97014 ELECTRIC STIMULATION THERAPY: CPT | Mod: GP | Performed by: PHYSICAL THERAPIST

## 2023-11-03 NOTE — PROGRESS NOTES
Physical Therapy    Physical Therapy Treatment    Patient Name: Estrella Villafana  MRN: [id number]  Today's Date: 11/3/2023      Visit #: 6 out of 8  Evaluation date: 9/20/23    Current Problem:   1. Low back pain, unspecified back pain laterality, unspecified chronicity, unspecified whether sciatica present  PT eval and treat      2. Right hip pain  PT eval and treat      3. Other abnormalities of gait and mobility  PT eval and treat          SUBJECTIVE:   The patient went to see Dr. Ann yesterday.  She was prescribed gabapentin to be taken at night.  The patient reports no new changes recently.       Precautions: None     Pain:   Start of session: 5/10  Post session: 5-6/10     OBJECTIVE:      Treatments:  IFC crossed to right hip/glute x 15 minutes during manual therapy.    Therapeutic Exercise: (30 minutes)  Posterior Pelvic Tilts 3 x 10 reps.  Clamshell 3 x 10 reps.  Piriformis Stretching 4 x 20 second hold.  Passive SKTC 3 x 20 second hold.  Seated Hamstrings stretching 4 x 20 seconds.  Hooklying hip adduction ball squeeze 3 x 10 reps.  Right Hip ER PROM unilateral.  Hip abduction AROM.    Manual Therapy: (15 minutes)  Right Leg pull 6 x 2 minute hold.  STM right piriformis and right glute.    HEP:  Review of HEP.    ASSESSMENT:   The patient did complete therapeutic exercise without pain increase.  However, the patient notes increased pain with weightbearing.  She was strongly encouraged to use walker for community ambulation, as this would likely reduce her pain complaints.    PLAN:  Continue per plan of care.

## 2023-11-08 ENCOUNTER — APPOINTMENT (OUTPATIENT)
Dept: PHYSICAL THERAPY | Facility: CLINIC | Age: 88
End: 2023-11-08
Payer: MEDICARE

## 2023-11-10 ENCOUNTER — TREATMENT (OUTPATIENT)
Dept: PHYSICAL THERAPY | Facility: CLINIC | Age: 88
End: 2023-11-10
Payer: MEDICARE

## 2023-11-10 DIAGNOSIS — R26.89 OTHER ABNORMALITIES OF GAIT AND MOBILITY: ICD-10-CM

## 2023-11-10 DIAGNOSIS — M25.551 RIGHT HIP PAIN: ICD-10-CM

## 2023-11-10 DIAGNOSIS — M48.062 SPINAL STENOSIS, LUMBAR REGION WITH NEUROGENIC CLAUDICATION: Primary | ICD-10-CM

## 2023-11-10 DIAGNOSIS — M54.50 LOW BACK PAIN, UNSPECIFIED BACK PAIN LATERALITY, UNSPECIFIED CHRONICITY, UNSPECIFIED WHETHER SCIATICA PRESENT: ICD-10-CM

## 2023-11-10 PROCEDURE — 97116 GAIT TRAINING THERAPY: CPT | Mod: GP | Performed by: PHYSICAL THERAPIST

## 2023-11-10 PROCEDURE — 97110 THERAPEUTIC EXERCISES: CPT | Mod: GP | Performed by: PHYSICAL THERAPIST

## 2023-11-10 NOTE — PROGRESS NOTES
Physical Therapy    Physical Therapy Treatment    Patient Name: Estrella Villafana  MRN: [id number]  Today's Date: 11/10/2023      Visit #: 7 out of 8  Evaluation date: 9/20/23    Current Problem:   1. Low back pain, unspecified back pain laterality, unspecified chronicity, unspecified whether sciatica present  PT eval and treat      2. Right hip pain  PT eval and treat      3. Other abnormalities of gait and mobility  PT eval and treat          SUBJECTIVE:   The patient has an appointment to see Dr. Quiroz next Monday.   Advil seems to help her back and hip pain.  Feels like the knee to chest stretching alleviates her pain.     Precautions: None     Pain:   Start of session: 5/10  Post session: 4/10     OBJECTIVE:      Therapeutic Exercise: (30 minutes)  Posterior Pelvic Tilts 3 x 10 reps.  Clamshell 3 x 10 reps.  Piriformis Stretching 4 x 20 second hold.  Passive SKTC 3 x 20 second hold.  Seated Hamstrings stretching 4 x 20 seconds.  Hooklying hip adduction ball squeeze 3 x 10 reps.  Right Hip ER PROM unilateral.  Hooklying Hip abduction against orange tband 3 x 10 reps.  Hooklying Hip Flexion against orange tband 3 x 10 reps.    Gait Training:  (10 minutes)  The patient ambulated with small based quad cane within the clinic with cues for sequencing.   The patient understands reasoning behind placing cane in her left UE.    HEP:  Review of HEP.    ASSESSMENT:   The patient noted less pain after the session today.  Feels comfortable with using a quad cane for community distances.  Will investigate obtaining a quad cane.    PLAN:  Continue per plan of care.

## 2023-11-10 NOTE — PROGRESS NOTES
Outpatient Visit Note    Chief Complaint   Patient presents with    3 month follow up         HPI:  Estrella Villafana is a 88 y.o. female with a past medical history significant for controlled diabetes, hypertension, hyperlipidemia, anxiety/depression, IBS, vitamin-D deficiency and cervical/lumbar degenerative disc disease currently followed by Dr. Ann of pain management and sacral wound followed wound care who presents to the office with for 3 month follow-up. She was last seen in the office on 8/22/2023 for follow-up.           She had previously been established with Dr. Wells for primary care, with prior established relationship with Dr. Reyes whom she last saw in 2018.           Blood work panel recently completed on 8/11/2023 including A1c, CBC, CMP, lipid panel and TSH which was remarkable for A1c of 5.8% and mildly low platelets.           Diabetes:  Patient did contact office following recommendation to discontinue metformin. Had noted significant improvement with diarrhea in side effects with glipizide 5 mg extended release daily. A1c sugar average has been checked showing great control at 5.8%. No reported polyuria, polydipsia, polyphagia or acute vision/sensation changes.           Chronic pain/fullness degenerative disc disease/polyarthralgias:  Continues to follow with with Dr. Ann of pain management. Last visit was on 8/17/2023. Has undergone injection therapy for lumbar degenerative disc disease.  In the interval, she is additionally followed through with consultation with Dr. Hall of Rheumatology, with main focus being on right hip. Therapeutic injection scheduled with physical therapy encouraged. Has been struggling with the pain, attempting to optimize Tylenol. States that her pain was severe over the weekend, causing her to take 2 tablets every 4 hours of 650 mg Tylenol. Does state that this helps some though she knows she was taking too much. In the interval, she did have recent  pain management assessment on 11/2/2023 at which trial of gabapentin was initiated.  Has been able to tolerate 100 mg of gabapentin thus far though no significant pain improvement.  Is preparing to increase to 200 mg daily.  Did discuss continuing with alternative therapies including turmeric.  Expressed interest of potential back brace which she plans to discuss with pain management.  Continues to work with physical therapy    Sacral wound:  In review, the patient presented to the [facility] emergency department on 01/08/2023 secondary to complaints of abscess of rectum. She was ultimately diagnosed with cellulitis with perception that abscess had successfully drained. She was started on regimen of clindamycin 300 mg 3 times a day and discharged home with wound care instructions and recommendations for outpatient follow-up with PCP. At time initial appointment she reported compliance with clindamycin noting improvement with redness/swelling. Had assistance from family with changing dressing daily. Patient continued to have prominently open wound with significant granulation tissue with overt drainage reported. [facility]. Patient had successful follow-through with wound care center with resolved sacral ulcer.  Continued monitoring and supportive care was advocated along with plans to focus on mobility with chronic right hip pain.  Did have hip injection through rheumatology which provided no symptom relief.  Continues to focus on repositioning with no active irritation of her sacrum           Anxiety/IBS/chronic diarrhea:  She separately reported ongoing gastrointestinal issues reporting frequent diarrhea and IBS like symptoms for the past several years. Had attempted to manage with Imodium without significant success. Denied ever being seen by a GI specialist in the past. Review of chart notes the patient has been uptake treated regimen of metformin 500 mg twice a day, secondary to  reports of hyperglycemia/prediabetes. Denied ever being diagnosed with diabetes to her knowledge. Denied any acute complaints such as hypoglycemic episodes, polyuria, polydipsia, polyphagia or acute vision/sensation changes. Stated that metformin regimen was increased during the time that her diarrhea worsened. Noted that her bowel issues caused significant strain/stress on her life. Had more stress since her  passed. This additionally contributes towards sleep issues. Has been actively managing diet you with BuSpar 10 mg twice a day to which she reports continued effectiveness.           Was given GI referral to have formal consultation regarding IBS. Discussed that metformin may likely be contributing towards symptoms though we would focus obtaining prior records to better understand the nature of her therapy choices and have consultation with GI. Had initial consultation with Dr. Westfall on 01/17/2023. Initiated meloxicam to help with discomfort related to sacral ulcer. He did coordinate blood work secondary to mild anemia appreciated on blood work in ER, including vitamin B12, folate, ferritin, iron panel and TSH, all of which were unremarkable. Stool studies were coordinated though GI felt that diarrhea was likely multifactorial with patient encouraged to stop metformin and aspirin, with initiation of probiotic. Stated that if symptoms persisted would consider colonoscopy. Follow-up appointment was completed on 01/31/2023 to which frequency of diarrhea had improved after stopping metformin. Probiotics and low FODMAP or advocated with clearance to use as needed Imodium.           Had continued with daily probiotics noting significant improvement with frequent diarrhea. Had follow-up appointment with GI in July. Was recently started on iron supplement secondary to anemia. Has experienced slight constipation and darker stools with no overt GI complaints.  States the constipation has improved with blood  counts improving on blood work completed and August    Current Medications  Current Outpatient Medications   Medication Instructions    acetaminophen (Tylenol Arthritis Pain) 650 mg ER tablet Tylenol Arthritis Pain    amLODIPine (Norvasc) 5 mg tablet 1 tablet, oral, Daily    aspirin (Adult Low Dose Aspirin) 81 mg EC tablet Every 24 hours    biotin 5 mg, oral, Daily    busPIRone (Buspar) 15 mg tablet 1 tablet, oral, 2 times daily    calcium carbonate (CALTRATE 600 ORAL) 600 mg, oral, 2 times daily    calcium carbonate (CALTRATE 600 ORAL) Every 12 hours    cholecalciferol (VITAMIN D3) 50 mcg, oral, Daily    ezetimibe (Zetia) 10 mg tablet 1 tablet, oral, Daily    fluocinonide (Lidex) 0.05 % external solution 1 Application, Topical, Daily    folic acid/multivit-min/lutein (CENTRUM SILVER ORAL) 1 tablet, oral, Daily    gabapentin (Neurontin) 100 mg capsule Take 1 capsule (100 mg) by mouth once daily at bedtime for 14 days, THEN 1 capsule (100 mg) 2 times a day for 16 days.    glipiZIDE XL (Glucotrol XL) 5 mg 24 hr tablet 1 tablet, oral, Daily, With food    iron 65 mg, oral, Daily    Lactobac. rhamnosus GG-inulin 10 billion cell -200 mg capsule, sprinkle oral    lactobacillus (Culturelle) 10 billion cell capsule 1 capsule, oral, Daily    loperamide (Imodium A-D) 2 mg tablet 1 tablet, oral, As needed    losartan (Cozaar) 50 mg tablet 1 tablet, oral, Daily    om3/dha/epa/fish/kril/lut/zeax (MEGARED ADV TOTAL BODY REFRESH ORAL) 1 capsule, oral, Daily    vitamins A,C,E-zinc-copper (PreserVision AREDS) 4,296 mcg-226 mg-90 mg capsule 1 capsule, oral, Daily        Allergies  Allergies   Allergen Reactions    Atorvastatin Other and Myalgia     Joint pain    Lactose GI Upset    Dexamethasone Myalgia    Lisinopril Myalgia and Unknown    Triamterene-Hydrochlorothiazid Myalgia    Cephalexin Diarrhea        Past Medical History:   Diagnosis Date    Anxiety     Cellulitis of finger of left hand 09/15/2023    Hyperglycemia      "Hyperlipidemia     Hypertension     Lactose intolerance     Osteoarthritis     Sciatica     Spinal stenosis       Past Surgical History:   Procedure Laterality Date    ADENOIDECTOMY      CHOLECYSTECTOMY  08/28/2014    ROBOTIC SINGLE PORT LAPAROSCOPIC    SPINE SURGERY  08/23/2022    \"TUBE IN SPINE\"    TONSILLECTOMY       Family History   Problem Relation Name Age of Onset    Hypertension Mother      Stroke Mother      Stroke Father      Retinal detachment Other females      Social History     Tobacco Use    Smoking status: Never    Smokeless tobacco: Never   Substance Use Topics    Alcohol use: Never    Drug use: Never       ROS  All pertinent positive symptoms are included in the history of present illness.  All other systems have been reviewed and are negative and noncontributory to this patient's current ailments.    VITAL SIGNS  Vitals:    11/13/23 1054   BP: 130/82   Pulse: 91   Temp: 36.7 °C (98 °F)   SpO2: 99%       PHYSICAL EXAM  GENERAL APPEARANCE: alert and oriented, Pleasant and cooperative, No Acute Distress  HEENT: EOMI, PERRLA, mild cerumen in left auditory canal, MMM  HEART: RRR, normal S1S2, no murmurs, click or rubs  LUNGS: clear to auscultation bilaterally, no wheezes/rhonchi/rales  EXTREMITIES: no edema, normal ROM  SKIN: normal, no rash, unremarkable  NEUROLOGIC EXAM: non-focal exam  MUSCULOSKELETAL: no gross abnormalities  PSYCH: affect is normal, eye contact is good      Assessment/Plan   Problem List Items Addressed This Visit             ICD-10-CM    Anxiety F41.9     - Stable on current regimen   -We will continue without modification         DDD (degenerative disc disease), cervical M50.30     - Continue with routine pain management follow-up per protocol         DDD (degenerative disc disease), lumbar M51.36    Essential hypertension I10     - Blood pressure stable in office today  -Continue on current regimen along with balanced diet and moderation of salt/caffeine         Hyperlipidemia " E78.5     - Cholesterol level stable on blood work completed in August  -Continue on current regimen along with healthy, low-fat diet         Irritable bowel syndrome with diarrhea K58.0     - Stable         Type 2 diabetes mellitus without complication, without long-term current use of insulin (CMS/[facility]) - Primary E11.9     - A1c performed in August showed great control at 5.8% on current regimen  -At this point, we can consider discontinuing glimepiride with plans to closely monitor sugars going forward  -Annual diabetic eye exam advocated

## 2023-11-13 ENCOUNTER — OFFICE VISIT (OUTPATIENT)
Dept: PRIMARY CARE | Facility: CLINIC | Age: 88
End: 2023-11-13
Payer: MEDICARE

## 2023-11-13 VITALS
HEART RATE: 91 BPM | OXYGEN SATURATION: 99 % | BODY MASS INDEX: 25.07 KG/M2 | SYSTOLIC BLOOD PRESSURE: 130 MMHG | TEMPERATURE: 98 F | DIASTOLIC BLOOD PRESSURE: 82 MMHG | WEIGHT: 153 LBS

## 2023-11-13 DIAGNOSIS — I10 ESSENTIAL HYPERTENSION: ICD-10-CM

## 2023-11-13 DIAGNOSIS — M51.36 DDD (DEGENERATIVE DISC DISEASE), LUMBAR: ICD-10-CM

## 2023-11-13 DIAGNOSIS — E11.9 TYPE 2 DIABETES MELLITUS WITHOUT COMPLICATION, WITHOUT LONG-TERM CURRENT USE OF INSULIN (MULTI): Primary | ICD-10-CM

## 2023-11-13 DIAGNOSIS — F41.9 ANXIETY: ICD-10-CM

## 2023-11-13 DIAGNOSIS — K58.0 IRRITABLE BOWEL SYNDROME WITH DIARRHEA: ICD-10-CM

## 2023-11-13 DIAGNOSIS — M50.30 DDD (DEGENERATIVE DISC DISEASE), CERVICAL: ICD-10-CM

## 2023-11-13 DIAGNOSIS — E78.5 HYPERLIPIDEMIA, UNSPECIFIED HYPERLIPIDEMIA TYPE: ICD-10-CM

## 2023-11-13 PROBLEM — L03.90 CELLULITIS: Status: RESOLVED | Noted: 2023-09-15 | Resolved: 2023-11-13

## 2023-11-13 PROCEDURE — 99213 OFFICE O/P EST LOW 20 MIN: CPT | Performed by: FAMILY MEDICINE

## 2023-11-13 PROCEDURE — 3075F SYST BP GE 130 - 139MM HG: CPT | Performed by: FAMILY MEDICINE

## 2023-11-13 PROCEDURE — 3079F DIAST BP 80-89 MM HG: CPT | Performed by: FAMILY MEDICINE

## 2023-11-13 PROCEDURE — 1036F TOBACCO NON-USER: CPT | Performed by: FAMILY MEDICINE

## 2023-11-13 PROCEDURE — 1159F MED LIST DOCD IN RCRD: CPT | Performed by: FAMILY MEDICINE

## 2023-11-13 PROCEDURE — 1125F AMNT PAIN NOTED PAIN PRSNT: CPT | Performed by: FAMILY MEDICINE

## 2023-11-13 PROCEDURE — 1160F RVW MEDS BY RX/DR IN RCRD: CPT | Performed by: FAMILY MEDICINE

## 2023-11-13 ASSESSMENT — ENCOUNTER SYMPTOMS
LOSS OF SENSATION IN FEET: 0
OCCASIONAL FEELINGS OF UNSTEADINESS: 0
DEPRESSION: 0

## 2023-11-13 NOTE — ASSESSMENT & PLAN NOTE
- Cholesterol level stable on blood work completed in August  -Continue on current regimen along with healthy, low-fat diet

## 2023-11-13 NOTE — PATIENT INSTRUCTIONS
Problem List Items Addressed This Visit             ICD-10-CM    Anxiety F41.9     - Stable on current regimen   -We will continue without modification         DDD (degenerative disc disease), cervical M50.30     - Continue with routine pain management follow-up per protocol         DDD (degenerative disc disease), lumbar M51.36    Essential hypertension I10     - Blood pressure stable in office today  -Continue on current regimen along with balanced diet and moderation of salt/caffeine         Hyperlipidemia E78.5     - Cholesterol level stable on blood work completed in August  -Continue on current regimen along with healthy, low-fat diet         Irritable bowel syndrome with diarrhea K58.0     - Stable         Type 2 diabetes mellitus without complication, without long-term current use of insulin (CMS/[facility]) - Primary E11.9     - A1c performed in August showed great control at 5.8% on current regimen  -At this point, we can consider discontinuing glimepiride with plans to closely monitor sugars going forward  -Annual diabetic eye exam advocated

## 2023-11-13 NOTE — ASSESSMENT & PLAN NOTE
- A1c performed in August showed great control at 5.8% on current regimen  -At this point, we can consider discontinuing glimepiride with plans to closely monitor sugars going forward  -Annual diabetic eye exam advocated

## 2023-11-13 NOTE — ASSESSMENT & PLAN NOTE
- Blood pressure stable in office today  -Continue on current regimen along with balanced diet and moderation of salt/caffeine

## 2023-11-17 ENCOUNTER — TREATMENT (OUTPATIENT)
Dept: PHYSICAL THERAPY | Facility: CLINIC | Age: 88
End: 2023-11-17
Payer: MEDICARE

## 2023-11-17 ENCOUNTER — APPOINTMENT (OUTPATIENT)
Dept: PHYSICAL THERAPY | Facility: CLINIC | Age: 88
End: 2023-11-17
Payer: MEDICARE

## 2023-11-17 DIAGNOSIS — M48.062 SPINAL STENOSIS, LUMBAR REGION WITH NEUROGENIC CLAUDICATION: ICD-10-CM

## 2023-11-17 PROCEDURE — 97110 THERAPEUTIC EXERCISES: CPT | Mod: GP | Performed by: PHYSICAL THERAPIST

## 2023-11-17 NOTE — PROGRESS NOTES
Physical Therapy    Physical Therapy Treatment    Patient Name: Estrella Villafana  MRN: [id number]  Today's Date: 11/17/2023      Visit #: 8 out of 8  Evaluation date: 9/20/23    Current Problem:   1. Spinal stenosis, lumbar region with neurogenic claudication  Follow Up In Physical Therapy          SUBJECTIVE:   The patient felt some relief after taking Gabapentin two days ago.  At present, her pain is like a constant toothache.  She feels not much change, but she feels like the exercises help.     Precautions: None     Pain:   Start of session: 5/10  Post session: 4/10     OBJECTIVE:      Therapeutic Exercise: (40 minutes)  Posterior Pelvic Tilts 3 x 10 reps.  Piriformis Stretching 4 x 20 second hold.  Passive SKTC 3 x 20 second hold.  DKTC 3 x 20 second hold.  DKTC with orange swiss ball 3 x 20 second hold.  Seated Hamstrings stretching 4 x 20 seconds.  Hooklying hip adduction ball squeeze 3 x 10 reps.  Right Hip ER PROM unilateral.  Hooklying Hip abduction against orange tband 3 x 10 reps.  Hooklying Hip Flexion against orange tband 3 x 10 reps.  LAQ 3 x10 reps.  Seated Hip Flexion 3 x10 reps.    HEP:  Review of HEP.  Added DKTC.    ASSESSMENT:   The patient noted less pain after the session today.  The patient is able to complete exercises in flexion bias.  No pain during activities today.  Pain does increase with transferring supine to sitting EOB. The patient also notes pain increase when getting into stance.  Feels less pain after session versus prior but pain still moderate.    PLAN:  Hold PT until the patient returns to Dr. Ann.

## 2023-11-21 ENCOUNTER — APPOINTMENT (OUTPATIENT)
Dept: PRIMARY CARE | Facility: CLINIC | Age: 88
End: 2023-11-21
Payer: MEDICARE

## 2023-12-07 ENCOUNTER — OFFICE VISIT (OUTPATIENT)
Dept: PAIN MEDICINE | Facility: CLINIC | Age: 88
End: 2023-12-07
Payer: MEDICARE

## 2023-12-07 VITALS
DIASTOLIC BLOOD PRESSURE: 80 MMHG | BODY MASS INDEX: 24.59 KG/M2 | RESPIRATION RATE: 16 BRPM | HEIGHT: 66 IN | WEIGHT: 153 LBS | SYSTOLIC BLOOD PRESSURE: 126 MMHG

## 2023-12-07 DIAGNOSIS — M47.817 SPONDYLOSIS WITHOUT MYELOPATHY OR RADICULOPATHY, LUMBOSACRAL REGION: ICD-10-CM

## 2023-12-07 DIAGNOSIS — G89.29 OTHER CHRONIC PAIN: ICD-10-CM

## 2023-12-07 DIAGNOSIS — M48.062 SPINAL STENOSIS, LUMBAR REGION WITH NEUROGENIC CLAUDICATION: Primary | ICD-10-CM

## 2023-12-07 PROCEDURE — 1159F MED LIST DOCD IN RCRD: CPT | Performed by: ANESTHESIOLOGY

## 2023-12-07 PROCEDURE — 1125F AMNT PAIN NOTED PAIN PRSNT: CPT | Performed by: ANESTHESIOLOGY

## 2023-12-07 PROCEDURE — 1160F RVW MEDS BY RX/DR IN RCRD: CPT | Performed by: ANESTHESIOLOGY

## 2023-12-07 PROCEDURE — 3079F DIAST BP 80-89 MM HG: CPT | Performed by: ANESTHESIOLOGY

## 2023-12-07 PROCEDURE — 1036F TOBACCO NON-USER: CPT | Performed by: ANESTHESIOLOGY

## 2023-12-07 PROCEDURE — 99214 OFFICE O/P EST MOD 30 MIN: CPT | Performed by: ANESTHESIOLOGY

## 2023-12-07 PROCEDURE — 3074F SYST BP LT 130 MM HG: CPT | Performed by: ANESTHESIOLOGY

## 2023-12-07 RX ORDER — FERROUS SULFATE 325(65) MG
65 TABLET, DELAYED RELEASE (ENTERIC COATED) ORAL
COMMUNITY
End: 2024-04-15 | Stop reason: WASHOUT

## 2023-12-07 RX ORDER — TRAMADOL HYDROCHLORIDE 50 MG/1
50 TABLET ORAL 2 TIMES DAILY PRN
Qty: 14 TABLET | Refills: 0 | Status: SHIPPED | OUTPATIENT
Start: 2023-12-07 | End: 2023-12-14

## 2023-12-07 ASSESSMENT — ENCOUNTER SYMPTOMS
GASTROINTESTINAL NEGATIVE: 1
ENDOCRINE NEGATIVE: 1
EYES NEGATIVE: 1
ARTHRALGIAS: 1
RESPIRATORY NEGATIVE: 1
CARDIOVASCULAR NEGATIVE: 1
CONSTITUTIONAL NEGATIVE: 1
PSYCHIATRIC NEGATIVE: 1
MYALGIAS: 1
ALLERGIC/IMMUNOLOGIC NEGATIVE: 1
WEAKNESS: 1
HEMATOLOGIC/LYMPHATIC NEGATIVE: 1
BACK PAIN: 1

## 2023-12-07 ASSESSMENT — PAIN SCALES - GENERAL
PAINLEVEL: 5
PAINLEVEL_OUTOF10: 5 - MODERATE PAIN

## 2023-12-07 ASSESSMENT — PATIENT HEALTH QUESTIONNAIRE - PHQ9
1. LITTLE INTEREST OR PLEASURE IN DOING THINGS: NOT AT ALL
SUM OF ALL RESPONSES TO PHQ9 QUESTIONS 1 AND 2: 0
2. FEELING DOWN, DEPRESSED OR HOPELESS: NOT AT ALL

## 2023-12-07 ASSESSMENT — PAIN - FUNCTIONAL ASSESSMENT: PAIN_FUNCTIONAL_ASSESSMENT: 0-10

## 2023-12-07 ASSESSMENT — PAIN DESCRIPTION - DESCRIPTORS: DESCRIPTORS: ACHING;TINGLING

## 2023-12-07 NOTE — PROGRESS NOTES
Subjective   Patient ID: Estrella Villafana is a 88 y.o. female who presents for Back Pain.  Back Pain  Associated symptoms include weakness.     Patient here today for follow-up on her low back and leg pain.  She reports that her pain has gotten significantly worse her ability to ambulate or stand for any extended period of time continues to decrease and living on her own and being independent becomes more more of a challenge.  She has undergone epidural steroid injections as well as hip injections with minimal relief.  She does not want to have any back surgery and underwent consultation years ago and the surgeon told her that it would be a very large back surgery and it would be hard for her to recover from.  She would be opening to having another spinal simplicity implant done at the L4-5 level since it worked well for the L3-4 level.  She is looking for any options that would be helpful for her.  She reports when she is standing and walking within about 3 to 5 minutes she will have severe back pain that will radiate to the right hip and will cause fatigue and numbness in her leg.  She questions about different medications.  She has a very sensitive stomach and often suffers with diarrhea.  She has been using a significant amount of Tylenol and ibuprofen and she is concerned about this.  Review of Systems   Constitutional: Negative.    HENT: Negative.     Eyes: Negative.    Respiratory: Negative.     Cardiovascular: Negative.    Gastrointestinal: Negative.    Endocrine: Negative.    Genitourinary: Negative.    Musculoskeletal:  Positive for arthralgias, back pain and myalgias.   Skin: Negative.    Allergic/Immunologic: Negative.    Neurological:  Positive for weakness.   Hematological: Negative.    Psychiatric/Behavioral: Negative.         Objective   Physical Exam  Vitals and nursing note reviewed.   Constitutional:       Appearance: Normal appearance.   HENT:      Head: Normocephalic and atraumatic.      Right  Ear: Ear canal and external ear normal.      Left Ear: Ear canal and external ear normal.      Nose: Nose normal.      Mouth/Throat:      Mouth: Mucous membranes are moist.      Pharynx: Oropharynx is clear.   Eyes:      Conjunctiva/sclera: Conjunctivae normal.      Pupils: Pupils are equal, round, and reactive to light.   Cardiovascular:      Rate and Rhythm: Normal rate.   Pulmonary:      Effort: Pulmonary effort is normal. No respiratory distress.   Musculoskeletal:      Cervical back: Normal range of motion and neck supple.      Lumbar back: Deformity (kyphotic) and tenderness present. Decreased range of motion. Negative left straight leg raise test. No scoliosis.      Right hip: Crepitus present. Decreased range of motion.   Skin:     General: Skin is warm and dry.   Neurological:      Mental Status: She is alert.      Motor: Motor function is intact.      Coordination: Coordination is intact.      Gait: Gait abnormal (lumbar flexed).   Psychiatric:         Mood and Affect: Mood normal.         Thought Content: Thought content normal.         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    Radiology: Lumbar MRI was reviewed again today.  I will have the patient have a new flexion-extension film completed in preparation for potential L4-5 Minuteman implant.    Physically:  continue home  exercise regimen    Psychologically:  no issues at this time.     Medication: I will trial the patient on tramadol 50 mg 1 tab PO BID prn.     [place] was reviewed and was consistent with the history.    Patient has been educated on the risks, benefits, and alternatives of controlled substances as well as the proper way to store these medications.  The patient and I discussed the nature of this medication and its side effects.  We discussed tolerance, physical dependence, psychological dependence, addiction and opioid-induced hyperalgesia.  We discussed the potential need to wean from this medication.  We discussed the availability of programs that can help with this process if necessary.  We discussed safety issues related to opioids including safe storage.  We discussed the fact that the patient should not drive an automobile or operate heavy machinery while taking this medication.  A prescription for naloxone was offered to the patient.  The patient will be re-evaluated for the need to continue opioid therapy in 60-90 days.  Patient was educated to take Tylenol normal strength with the tramadol.  Patient to stay off gabapentin as she did have neurocognitive side effects from 100 mg dosing        Duration:  > 1 year    Intervention: See how the patient does on tramadol.  We did discuss moving forward with a second implant Minuteman implant at the L4-5 level to help with her listhesis and severe central canal stenosis.       Star Ann MD 12/07/23 9:46 AM

## 2023-12-11 ENCOUNTER — HOSPITAL ENCOUNTER (OUTPATIENT)
Dept: RADIOLOGY | Facility: HOSPITAL | Age: 88
Discharge: HOME | End: 2023-12-11
Payer: MEDICARE

## 2023-12-11 DIAGNOSIS — M48.062 SPINAL STENOSIS, LUMBAR REGION WITH NEUROGENIC CLAUDICATION: ICD-10-CM

## 2023-12-11 DIAGNOSIS — M47.817 SPONDYLOSIS WITHOUT MYELOPATHY OR RADICULOPATHY, LUMBOSACRAL REGION: ICD-10-CM

## 2023-12-11 PROCEDURE — 72114 X-RAY EXAM L-S SPINE BENDING: CPT

## 2023-12-20 DIAGNOSIS — F41.9 ANXIETY: Primary | ICD-10-CM

## 2023-12-20 RX ORDER — BUSPIRONE HYDROCHLORIDE 15 MG/1
15 TABLET ORAL 2 TIMES DAILY
Qty: 180 TABLET | Refills: 3 | Status: SHIPPED | OUTPATIENT
Start: 2023-12-20 | End: 2024-12-19

## 2023-12-20 NOTE — TELEPHONE ENCOUNTER
Pt called in for refill on the med that has been populated as well as pharmacy, states that she normally gets a 90 day fill, states that she doesn't have any left. Last OV 11/21/2023. Please advise

## 2024-01-04 ENCOUNTER — OFFICE VISIT (OUTPATIENT)
Dept: PAIN MEDICINE | Facility: CLINIC | Age: 89
End: 2024-01-04
Payer: MEDICARE

## 2024-01-04 VITALS
SYSTOLIC BLOOD PRESSURE: 112 MMHG | RESPIRATION RATE: 16 BRPM | DIASTOLIC BLOOD PRESSURE: 78 MMHG | HEIGHT: 66 IN | WEIGHT: 153 LBS | BODY MASS INDEX: 24.59 KG/M2

## 2024-01-04 DIAGNOSIS — G89.29 OTHER CHRONIC PAIN: ICD-10-CM

## 2024-01-04 DIAGNOSIS — M54.16 RADICULOPATHY, LUMBAR REGION: ICD-10-CM

## 2024-01-04 DIAGNOSIS — M47.817 SPONDYLOSIS WITHOUT MYELOPATHY OR RADICULOPATHY, LUMBOSACRAL REGION: ICD-10-CM

## 2024-01-04 DIAGNOSIS — M48.062 SPINAL STENOSIS, LUMBAR REGION WITH NEUROGENIC CLAUDICATION: Primary | ICD-10-CM

## 2024-01-04 PROCEDURE — 1125F AMNT PAIN NOTED PAIN PRSNT: CPT | Performed by: ANESTHESIOLOGY

## 2024-01-04 PROCEDURE — 99214 OFFICE O/P EST MOD 30 MIN: CPT | Performed by: ANESTHESIOLOGY

## 2024-01-04 PROCEDURE — 1159F MED LIST DOCD IN RCRD: CPT | Performed by: ANESTHESIOLOGY

## 2024-01-04 PROCEDURE — 3074F SYST BP LT 130 MM HG: CPT | Performed by: ANESTHESIOLOGY

## 2024-01-04 PROCEDURE — 1160F RVW MEDS BY RX/DR IN RCRD: CPT | Performed by: ANESTHESIOLOGY

## 2024-01-04 PROCEDURE — 3078F DIAST BP <80 MM HG: CPT | Performed by: ANESTHESIOLOGY

## 2024-01-04 PROCEDURE — 1036F TOBACCO NON-USER: CPT | Performed by: ANESTHESIOLOGY

## 2024-01-04 ASSESSMENT — ENCOUNTER SYMPTOMS
GASTROINTESTINAL NEGATIVE: 1
BACK PAIN: 1
CARDIOVASCULAR NEGATIVE: 1
ARTHRALGIAS: 1
WEAKNESS: 1
MYALGIAS: 1
EYES NEGATIVE: 1
CONSTITUTIONAL NEGATIVE: 1
PSYCHIATRIC NEGATIVE: 1
HEMATOLOGIC/LYMPHATIC NEGATIVE: 1
RESPIRATORY NEGATIVE: 1
ALLERGIC/IMMUNOLOGIC NEGATIVE: 1
ENDOCRINE NEGATIVE: 1

## 2024-01-04 ASSESSMENT — PATIENT HEALTH QUESTIONNAIRE - PHQ9
2. FEELING DOWN, DEPRESSED OR HOPELESS: NOT AT ALL
1. LITTLE INTEREST OR PLEASURE IN DOING THINGS: NOT AT ALL
SUM OF ALL RESPONSES TO PHQ9 QUESTIONS 1 AND 2: 0

## 2024-01-04 ASSESSMENT — PAIN SCALES - GENERAL: PAINLEVEL_OUTOF10: 7

## 2024-01-04 ASSESSMENT — PAIN DESCRIPTION - DESCRIPTORS: DESCRIPTORS: TINGLING

## 2024-01-04 ASSESSMENT — PAIN - FUNCTIONAL ASSESSMENT: PAIN_FUNCTIONAL_ASSESSMENT: 0-10

## 2024-01-04 NOTE — H&P (VIEW-ONLY)
Subjective   Patient ID: Estrella Villafana is a 88 y.o. female who presents for Back Pain.  Back Pain  Associated symptoms include weakness.   Patient here today for follow-up from starting her trial of tramadol.  She was taking it in the evening time and did not see any benefit from it.  She tried to take a tablet during the afternoon and it did make her woozy.  She does have about 5 or 6 tablets left she can try half a tablet in the afternoon.  Her pain has become severe she rates it as a 6-7 out of 10.  She has pain in her right back that radiates and wraps around her right hip into her right anterior thigh and groin.  She states that getting out of bed can be very challenging she is concerned that she will not be able to live independently.  She has not had epidural steroid injections in over a year.  She would like to move forward with any type of intervention and getting set up for the L4-5 Minuteman implant.  She did well with the L3-4 1 and was walking on the treadmill up until about 6 months ago when her pain progressed.  She did have intra-articular hip injection which did not provide her any benefit.  She has not had a orthopedic consult for her hip.  She states that she had will furniture walk in her house she tried to use a wheeled walker however it was challenging for her for her arms.  She is open to any options moving forward.  She reports no numbness down the leg or tingling.    Review of Systems   Constitutional: Negative.    HENT: Negative.     Eyes: Negative.    Respiratory: Negative.     Cardiovascular: Negative.    Gastrointestinal: Negative.    Endocrine: Negative.    Genitourinary: Negative.    Musculoskeletal:  Positive for arthralgias, back pain and myalgias.   Skin: Negative.    Allergic/Immunologic: Negative.    Neurological:  Positive for weakness.   Hematological: Negative.    Psychiatric/Behavioral: Negative.         Objective   Physical Exam  Vitals and nursing note reviewed.    Constitutional:       Appearance: Normal appearance.   HENT:      Head: Normocephalic and atraumatic.      Right Ear: Ear canal and external ear normal.      Left Ear: Ear canal and external ear normal.      Nose: Nose normal.      Mouth/Throat:      Mouth: Mucous membranes are moist.      Pharynx: Oropharynx is clear.   Eyes:      Conjunctiva/sclera: Conjunctivae normal.      Pupils: Pupils are equal, round, and reactive to light.   Cardiovascular:      Rate and Rhythm: Normal rate.   Pulmonary:      Effort: Pulmonary effort is normal. No respiratory distress.   Musculoskeletal:      Cervical back: Normal range of motion and neck supple.      Lumbar back: Deformity (kyphotic) and tenderness present. Decreased range of motion. Negative left straight leg raise test. No scoliosis.      Right hip: Crepitus present. Decreased range of motion.   Skin:     General: Skin is warm and dry.   Neurological:      Mental Status: She is alert.      Motor: Motor function is intact.      Coordination: Coordination is intact.      Gait: Gait abnormal (lumbar flexed).   Psychiatric:         Mood and Affect: Mood normal.         Thought Content: Thought content normal.         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    Radiology: All available imaging was reviewed today.  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  She has extensive spinal stenosis as seen at L3-4 L4-5.    Physically: Patient should continue home exercise program.    Psychologically: No issues at this time.  No changes to the medication regimen.    Medication: No changes to the medication regimen.  A PDMP  report was checked today, and was consistent with reported prescribing.    Duration: Greater than 1 year.    Intervention: Patient continues to have severe pain with standing and walking which improves with sitting down.  She has not had epidural steroid injection for over 1 year.  I will get her set up for caudal epidural steroid injection under fluoroscopic guidance and local anesthetic.  Risks, benefit, and alternatives of the procedure were discussed with the patient.  Oswestry score has been compelted and recorded.    Patient has done well with Minuteman posterior fusion device at the L3-4 level.  The patient would like to move forward with implantation at the L4-5 level.  Risks, benefit, and alternatives of the procedure were discussed with the patient.  Oswestry score has been compelted and recorded.      Star Ann MD 01/04/24 10:12 AM

## 2024-01-04 NOTE — PROGRESS NOTES
Subjective   Patient ID: Estrella Villafana is a 88 y.o. female who presents for Back Pain.  Back Pain  Associated symptoms include weakness.   Patient here today for follow-up from starting her trial of tramadol.  She was taking it in the evening time and did not see any benefit from it.  She tried to take a tablet during the afternoon and it did make her woozy.  She does have about 5 or 6 tablets left she can try half a tablet in the afternoon.  Her pain has become severe she rates it as a 6-7 out of 10.  She has pain in her right back that radiates and wraps around her right hip into her right anterior thigh and groin.  She states that getting out of bed can be very challenging she is concerned that she will not be able to live independently.  She has not had epidural steroid injections in over a year.  She would like to move forward with any type of intervention and getting set up for the L4-5 posterior-lateral arthrodesis She did well with the L3-4 level and was walking on the treadmill up until about 6 months ago when her pain progressed.  She did have intra-articular hip injection which did not provide her any benefit.  She has not had a orthopedic consult for her hip.  She states that she had will furniture walk in her house she tried to use a wheeled walker however it was challenging for her for her arms.  She is open to any options moving forward.  She reports no numbness down the leg or tingling.    Review of Systems   Constitutional: Negative.    HENT: Negative.     Eyes: Negative.    Respiratory: Negative.     Cardiovascular: Negative.    Gastrointestinal: Negative.    Endocrine: Negative.    Genitourinary: Negative.    Musculoskeletal:  Positive for arthralgias, back pain and myalgias.   Skin: Negative.    Allergic/Immunologic: Negative.    Neurological:  Positive for weakness.   Hematological: Negative.    Psychiatric/Behavioral: Negative.         Objective   Physical Exam  Vitals and nursing note  reviewed.   Constitutional:       Appearance: Normal appearance.   HENT:      Head: Normocephalic and atraumatic.      Right Ear: Ear canal and external ear normal.      Left Ear: Ear canal and external ear normal.      Nose: Nose normal.      Mouth/Throat:      Mouth: Mucous membranes are moist.      Pharynx: Oropharynx is clear.   Eyes:      Conjunctiva/sclera: Conjunctivae normal.      Pupils: Pupils are equal, round, and reactive to light.   Cardiovascular:      Rate and Rhythm: Normal rate.   Pulmonary:      Effort: Pulmonary effort is normal. No respiratory distress.   Musculoskeletal:      Cervical back: Normal range of motion and neck supple.      Lumbar back: Deformity (kyphotic) and tenderness present. Decreased range of motion. Negative left straight leg raise test. No scoliosis.      Right hip: Crepitus present. Decreased range of motion.   Skin:     General: Skin is warm and dry.   Neurological:      Mental Status: She is alert.      Motor: Motor function is intact.      Coordination: Coordination is intact.      Gait: Gait abnormal (lumbar flexed).   Psychiatric:         Mood and Affect: Mood normal.         Thought Content: Thought content normal.         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    Radiology: All available imaging was reviewed today.  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  She has extensive spinal stenosis as seen at L3-4 L4-5.    Physically: Patient should continue home exercise program.    Psychologically: No issues at this time.  No changes to the medication regimen.    Medication: No changes to the medication  regimen.  A PDMP report was checked today, and was consistent with reported prescribing.    Duration: Greater than 1 year.    Intervention: Patient continues to have severe pain with standing and walking which improves with sitting down.  She has not had epidural steroid injection for over 1 year.  I will get her set up for caudal epidural steroid injection under fluoroscopic guidance and local anesthetic.  Risks, benefit, and alternatives of the procedure were discussed with the patient.  Oswestry score has been compelted and recorded.    Patient has done well with Posterior-Lateral Athrodesis device at the L3-4 level.  The patient would like to move forward with implantation at the L4-5 level.  Risks, benefit, and alternatives of the procedure were discussed with the patient.  Oswestry score has been compelted and recorded.      Star Ann MD 01/04/24 10:12 AM

## 2024-01-05 DIAGNOSIS — M54.16 RADICULOPATHY, LUMBAR REGION: Primary | ICD-10-CM

## 2024-01-05 RX ORDER — TRAMADOL HYDROCHLORIDE 50 MG/1
50 TABLET ORAL 2 TIMES DAILY PRN
Qty: 14 TABLET | Refills: 0 | Status: SHIPPED | OUTPATIENT
Start: 2024-01-05 | End: 2024-01-12

## 2024-01-05 NOTE — TELEPHONE ENCOUNTER
"Patient called in stating that she did find she can split this in half and she will be splitting it in the morning and then taking a full one at night time. She states that she taking a full one makes her \"not feel in control\". She states that she has about 6 pills left. She would like to start taking these on Monday.     Thanks!   "

## 2024-01-19 ENCOUNTER — APPOINTMENT (OUTPATIENT)
Dept: OPERATING ROOM | Facility: HOSPITAL | Age: 89
End: 2024-01-19
Payer: MEDICARE

## 2024-01-26 ENCOUNTER — HOSPITAL ENCOUNTER (OUTPATIENT)
Dept: RADIOLOGY | Facility: HOSPITAL | Age: 89
Discharge: HOME | End: 2024-01-26
Payer: MEDICARE

## 2024-01-26 ENCOUNTER — HOSPITAL ENCOUNTER (OUTPATIENT)
Dept: GASTROENTEROLOGY | Facility: HOSPITAL | Age: 89
Discharge: HOME | End: 2024-01-26
Payer: MEDICARE

## 2024-01-26 VITALS
HEART RATE: 77 BPM | RESPIRATION RATE: 18 BRPM | HEIGHT: 64 IN | SYSTOLIC BLOOD PRESSURE: 181 MMHG | DIASTOLIC BLOOD PRESSURE: 71 MMHG | OXYGEN SATURATION: 98 % | TEMPERATURE: 97.2 F | WEIGHT: 150 LBS | BODY MASS INDEX: 25.61 KG/M2

## 2024-01-26 DIAGNOSIS — M54.16 RADICULOPATHY, LUMBAR REGION: ICD-10-CM

## 2024-01-26 DIAGNOSIS — M48.062 SPINAL STENOSIS, LUMBAR REGION WITH NEUROGENIC CLAUDICATION: ICD-10-CM

## 2024-01-26 LAB — GLUCOSE BLD MANUAL STRIP-MCNC: 91 MG/DL (ref 74–99)

## 2024-01-26 PROCEDURE — 62323 NJX INTERLAMINAR LMBR/SAC: CPT | Performed by: ANESTHESIOLOGY

## 2024-01-26 PROCEDURE — 76000 FLUOROSCOPY <1 HR PHYS/QHP: CPT

## 2024-01-26 PROCEDURE — 82947 ASSAY GLUCOSE BLOOD QUANT: CPT

## 2024-01-26 RX ORDER — IBUPROFEN 200 MG
600 TABLET ORAL 2 TIMES DAILY
COMMUNITY

## 2024-01-26 ASSESSMENT — PAIN SCALES - GENERAL
PAINLEVEL_OUTOF10: 7
PAINLEVEL_OUTOF10: 0 - NO PAIN

## 2024-01-26 ASSESSMENT — PAIN - FUNCTIONAL ASSESSMENT
PAIN_FUNCTIONAL_ASSESSMENT: 0-10
PAIN_FUNCTIONAL_ASSESSMENT: 0-10

## 2024-01-26 ASSESSMENT — COLUMBIA-SUICIDE SEVERITY RATING SCALE - C-SSRS
2. HAVE YOU ACTUALLY HAD ANY THOUGHTS OF KILLING YOURSELF?: NO
1. IN THE PAST MONTH, HAVE YOU WISHED YOU WERE DEAD OR WISHED YOU COULD GO TO SLEEP AND NOT WAKE UP?: NO
6. HAVE YOU EVER DONE ANYTHING, STARTED TO DO ANYTHING, OR PREPARED TO DO ANYTHING TO END YOUR LIFE?: NO

## 2024-01-26 NOTE — NURSING NOTE
1203: Pt returned alert and oriented. Pt is not in any distress. Pt states pain is a 0/10. Band aid to back is c/d/I.     1227: Pt is able to stand and ambulate without difficulty to wheelchair. Pt educated on discharge instructions.

## 2024-01-26 NOTE — OP NOTE
Preprocedure diagnosis: Lumbar spinal stenosis with neurogenic claudication  Postprocedure diagnosis lumbar spinal stenosis with neurogenic claudication    Procedure performed: Caudal epidural steroid injection    Physician: Star Ann MD    Anesthesia: Local    Complications: none    Blood loss:  none    Clinical note: This is a very pleasant 88-year-old female who suffers with low back and leg pain here meeting all medical criteria for above-mentioned procedure.    Procedure:  Procedure: Caudal Epidural Steroid Injection    The patient was identified in the preoperative area.  The procedure was discussed in detail including its risks, benefits, and alternatives.  Signed consent was obtained and the patient agreed to proceed.     The patient was brought to the [facility] procedure room and placed in the prone position onto the fluoroscopy table. The lumbosacral area was prepped and draped in the usual sterile fashion using Chloroprep and a fenestrated drape.  Under fluoroscopic guidance in the lateral view the sacrum was imaged and the the intended needle insertion site was marked onto the skin.  The skin was anesthetized with 5ml of 2% lidocaine.  After adequate skin anesthesia was obtained, a 18-gauge Touhy was inserted through the sacro-coccygeal ligament into the epidural space under fluoroscopic guidance in the AP view.  The needle was aspirated and 1 ml of omnipaque contrast dye was injected under live fluoroscopy which showed epidural spread.  The needle was then advanced under intermittent fluoroscopic guidance in the AP until the needle tip was between the S2 and S3 neuro-foramen.  The needle was aspirated for heme and csf again, which was negative, and 1 ml of omnipaque was injected under live fluoroscopy which showed appropriate spread without intrathecal or intravascular uptake.  Then 9 ml of 0.5% preservative free lidocaine and 40 mg of triamcinolone was injected.  The needle was removed and a sterile  bandage was applied. The patient was take back to the recovery area.     The procedure was completed without complications and was tolerated well. The patient was monitored after the procedure. The patient (or responsible party) was given post-procedure and discharge instructions to follow at home. The patient was discharged in stable condition. A follow up appointment was made.

## 2024-01-26 NOTE — DISCHARGE INSTRUCTIONS
DISCHARGE INSTRUCTIONS FOR INJECTIONS     You underwent Caudal CHU today    Aftermost injections, it is recommended that you relax and limit your activity for the remainder of the day unless you have been told otherwise by your pain physician.  You should not drive a car, operate machinery, or make important legal decisions unless otherwise directed by your pain physician.  You may resume your normal activity, including exercise, tomorrow.      Keep a written pain diary of how much pain relief you experienced following the injection procedure and the length of time of pain relief you experienced pain relief. Following diagnostic injections like medial branch nerve blocks, sacroiliac joint blocks, stellate ganglion injections and other blocks, it is very important you record the specific amount of pain relief you experienced immediately after the injectionand how long it lasted. Your doctor will ask you for this information at your follow up visit.     For all injections, please keep the injection site dry and inspect the site for a couple of days. You may remove the Band-Aid the day of the injection at any time.     Some discomfort, bruising or slight swelling may occur at the injection site. This is not abnormal if it occurs.  If needed you may:    -Take over the counter medication such as Tylenol or Motrin.   -Apply an ice pack for 30 minutes, 2 to 3 times a day for the first 24 hours.     You may shower today; no soaking baths, hot tubs, whirlpools or swimming pools for two days.      If you are given steroids in your injection, it may take 3-5 days for the steroid medication to take effect. You may notice a worsening of your symptoms for 1-2 days after the injection. This is not abnormal.  You may use acetaminophen, ibuprofen, or prescription medication that your doctor may have prescribed for you if you need to do so.     A few common side effects of steroids include facial flushing, sweating, restlessness,  irritability,difficulty sleeping, increase in blood sugar, and increased blood pressure. If you have diabetes, please monitor your blood sugar at least once a day for at least 5 days. If you have poorly controlled high blood pressure, monitoryour blood pressure for at least 2 days and contact your primary care physician if these numbers are unusually high for you.      If you take aspirin or non-steroidal anti-inflammatory drugs (examples are Motrin, Advil, ibuprofen, Naprosyn, Voltaren, Relafen, etc.) you may restart these this evening, but stop taking it 3 days before your next appointment, unless instructed otherwiseby your physician.      You do not need to discontinue non-aspirin-containing pain medications prior to an injection (examples: Celebrex, tramadol, hydrocodone and acetaminophen).      If you take a blood thinning medication (Coumadin, Lovenox, Fragmin,Ticlid, Plavix, Pradaxa, etc.), please discuss this with your primary care physician/cardiologist and your pain physician. These medications MUST be discontinued before you can have an injection safely, without the risk of uncontrolled bleeding. If these medications are not discontinued for an appropriate period of time, you will not be able to receivean injection.      If you are taking Coumadin, please have your INR checked the morning of your procedure and bringthe result to your appointment unless otherwise instructed. If your INR is over 1.2, your injection may need to be rescheduled to avoid uncontrolled bleeding from the needle placement.     Call [facility] at [phone] between 8am-4pm Monday - Friday if you are experiencing the following:    If you received an epidural or spinal injection:    -Headache that doesnot go away with medicine, is worse when sitting or standing up, and is greatly relieved upon lying down.   -Severe pain worse than or different than your baseline pain.   -Chills or fever (101º F or greater).    -Drainage or signs of infection at the injection site     Go directly to the Emergency Department if you are experiencing the following and received an epidural or spinal injection:   -Abrupt weakness or progressive weakness in your legs that starts after you leave the clinic.   -Abrupt severe or worsening numbness in your legs.   -Inability to urinate after the injection or loss of bowel or bladder control without the urge to defecate or urinate.     If you have a clinical question that cannot wait until your next appointment, please call [phone] between 8am-4pm Monday - Friday or send a [employer] message. We do our best to return all non-emergency messages within 24 hours, Monday - Friday. A nurse or physician will return your message.      If you need to cancel an appointment, please call the scheduling staff at [phone] during normal business hours or leave a message at least 24 hours in advance.     If you are going to be sedated for your next procedure, you MUST have responsible adult who can legally drive accompany you home. You cannot eat or drink for eight hours prior to the planned procedure if you are going to receive sedation. You may take your non-blood thinning medications with a small sip of water.

## 2024-02-01 ENCOUNTER — APPOINTMENT (OUTPATIENT)
Dept: PAIN MEDICINE | Facility: CLINIC | Age: 89
End: 2024-02-01
Payer: MEDICARE

## 2024-02-08 ENCOUNTER — OFFICE VISIT (OUTPATIENT)
Dept: PAIN MEDICINE | Facility: CLINIC | Age: 89
End: 2024-02-08
Payer: MEDICARE

## 2024-02-08 VITALS
HEIGHT: 64 IN | BODY MASS INDEX: 25.61 KG/M2 | SYSTOLIC BLOOD PRESSURE: 118 MMHG | HEART RATE: 86 BPM | WEIGHT: 150 LBS | RESPIRATION RATE: 20 BRPM | DIASTOLIC BLOOD PRESSURE: 70 MMHG

## 2024-02-08 DIAGNOSIS — M48.061 SPINAL STENOSIS AT L4-L5 LEVEL: ICD-10-CM

## 2024-02-08 DIAGNOSIS — L02.31 CELLULITIS AND ABSCESS OF BUTTOCK: ICD-10-CM

## 2024-02-08 DIAGNOSIS — L03.317 CELLULITIS AND ABSCESS OF BUTTOCK: ICD-10-CM

## 2024-02-08 DIAGNOSIS — M00.9: Primary | ICD-10-CM

## 2024-02-08 DIAGNOSIS — M48.062 SPINAL STENOSIS, LUMBAR REGION WITH NEUROGENIC CLAUDICATION: ICD-10-CM

## 2024-02-08 PROCEDURE — 3074F SYST BP LT 130 MM HG: CPT | Performed by: PHYSICIAN ASSISTANT

## 2024-02-08 PROCEDURE — 99214 OFFICE O/P EST MOD 30 MIN: CPT | Performed by: PHYSICIAN ASSISTANT

## 2024-02-08 PROCEDURE — 1125F AMNT PAIN NOTED PAIN PRSNT: CPT | Performed by: PHYSICIAN ASSISTANT

## 2024-02-08 PROCEDURE — 1160F RVW MEDS BY RX/DR IN RCRD: CPT | Performed by: PHYSICIAN ASSISTANT

## 2024-02-08 PROCEDURE — 1159F MED LIST DOCD IN RCRD: CPT | Performed by: PHYSICIAN ASSISTANT

## 2024-02-08 PROCEDURE — 3078F DIAST BP <80 MM HG: CPT | Performed by: PHYSICIAN ASSISTANT

## 2024-02-08 PROCEDURE — 1036F TOBACCO NON-USER: CPT | Performed by: PHYSICIAN ASSISTANT

## 2024-02-08 RX ORDER — TRAMADOL HYDROCHLORIDE 50 MG/1
50 TABLET ORAL 2 TIMES DAILY
Qty: 14 TABLET | Refills: 2 | Status: SHIPPED | OUTPATIENT
Start: 2024-02-08 | End: 2024-02-29

## 2024-02-08 RX ORDER — TRAMADOL HYDROCHLORIDE 50 MG/1
50 TABLET ORAL 2 TIMES DAILY
COMMUNITY
End: 2024-02-08 | Stop reason: SDUPTHER

## 2024-02-08 ASSESSMENT — LIFESTYLE VARIABLES
HOW OFTEN DO YOU HAVE SIX OR MORE DRINKS ON ONE OCCASION: NEVER
AUDIT-C TOTAL SCORE: 0
HOW OFTEN DO YOU HAVE A DRINK CONTAINING ALCOHOL: NEVER
HOW MANY STANDARD DRINKS CONTAINING ALCOHOL DO YOU HAVE ON A TYPICAL DAY: PATIENT DOES NOT DRINK
SKIP TO QUESTIONS 9-10: 1

## 2024-02-08 ASSESSMENT — ENCOUNTER SYMPTOMS
DIARRHEA: 0
BACK PAIN: 1
COUGH: 0
SORE THROAT: 0
SLEEP DISTURBANCE: 0
CHEST TIGHTNESS: 0
ACTIVITY CHANGE: 0
UNEXPECTED WEIGHT CHANGE: 0
FATIGUE: 0
VOICE CHANGE: 0
PALPITATIONS: 0
VOMITING: 0
SHORTNESS OF BREATH: 0
NAUSEA: 0
CHILLS: 0
FEVER: 0

## 2024-02-08 ASSESSMENT — PATIENT HEALTH QUESTIONNAIRE - PHQ9
2. FEELING DOWN, DEPRESSED OR HOPELESS: NOT AT ALL
SUM OF ALL RESPONSES TO PHQ9 QUESTIONS 1 & 2: 0
1. LITTLE INTEREST OR PLEASURE IN DOING THINGS: NOT AT ALL

## 2024-02-08 ASSESSMENT — PAIN - FUNCTIONAL ASSESSMENT: PAIN_FUNCTIONAL_ASSESSMENT: 0-10

## 2024-02-08 ASSESSMENT — PAIN SCALES - GENERAL
PAINLEVEL_OUTOF10: 8
PAINLEVEL: 8

## 2024-02-08 ASSESSMENT — PAIN DESCRIPTION - DESCRIPTORS: DESCRIPTORS: SHOOTING;ACHING

## 2024-02-08 NOTE — PROGRESS NOTES
Subjective   Patient ID: Estrella Villafana is a 88 y.o. female who presents for Back Pain.  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  Patient did notes about a week and a half ago she did started developing a spot on her buttocks that has been become painful and a large swelling on her right hand states she has had these before in the wound clinic is lanced them and provided with her antibiotics.  Patient did notes that the caudal epidural injection did only provide about 20 to 40% reduction of symptoms.  She also had information that her Minuteman procedure has been denied by insurance she has been utilizing the tramadol on bad days that allow her to be able to get to sleep.  She did notes that on her buttocks region she has been applying hydrocortisone 10 makes it feel better        Review of Systems   Constitutional:  Negative for activity change, chills, fatigue, fever and unexpected weight change.   HENT:  Negative for ear pain, sore throat and voice change.    Eyes:  Negative for visual disturbance.   Respiratory:  Negative for cough, chest tightness and shortness of breath.    Cardiovascular:  Negative for chest pain and palpitations.   Gastrointestinal:  Negative for diarrhea, nausea and vomiting.   Musculoskeletal:  Positive for back pain.   Psychiatric/Behavioral:  Negative for behavioral problems, self-injury, sleep disturbance and suicidal ideas.        Objective   Physical Exam  Genitourinary:      Musculoskeletal:      Lumbar back: Tenderness present. Decreased range of motion.      Comments: On the right MTP is a large swelling with a small portal of entry         Assessment/Plan   Problem List Items Addressed This Visit             ICD-10-CM    Spinal stenosis, lumbar region with neurogenic claudication M48.062    Spinal stenosis at L4-L5 level M48.061    Relevant Medications    traMADol (Ultram) 50 mg tablet    Infection of  joint (CMS/[facility]) - Primary M00.9    Cellulitis and abscess of buttock L02.31, L03.317     I am and have patient go to the urgent care for evaluation of what I think is 2 areas of infection on the knuckle and the buttocks region.  Do not feel confident lancing nor I feel that this would be appropriate treatment at this time.  Did discuss keeping areas dry and clean and to discontinue the hydrocortisone.     For patient utilizing these medications of the middle we did sign an opiate agreement today patient will use very judiciously and 14 tablets is not a lot.  Will have patient follow-up in 3 months.  She will also follow-up with her supervising physician Dr. HUGHES.  Patient's denial is likely being appealed for the Minuteman    Failure of conservative treatment including caudal epidural injection     Rob Elizondo PA-C 02/08/24 1:49 PM

## 2024-02-08 NOTE — PROGRESS NOTES
SPO2 [id number]     MEDICATION NAME: Tramadol   STRENGTH: 50mg  LAST FILL DATE: 24  DATE LAST TAKEN: 24  QUANTITY FILLED: 14  QUANTITY REMAININ  COUNT COMPLETED BY: CODIE Steinberg and Velasquez RN

## 2024-02-09 ENCOUNTER — OFFICE VISIT (OUTPATIENT)
Dept: PRIMARY CARE | Facility: CLINIC | Age: 89
End: 2024-02-09
Payer: MEDICARE

## 2024-02-09 VITALS
DIASTOLIC BLOOD PRESSURE: 62 MMHG | HEART RATE: 80 BPM | BODY MASS INDEX: 24.96 KG/M2 | TEMPERATURE: 98.3 F | HEIGHT: 64 IN | WEIGHT: 146.2 LBS | OXYGEN SATURATION: 97 % | SYSTOLIC BLOOD PRESSURE: 134 MMHG

## 2024-02-09 DIAGNOSIS — L03.119 CELLULITIS AND ABSCESS OF HAND: Primary | ICD-10-CM

## 2024-02-09 DIAGNOSIS — L02.31 CELLULITIS AND ABSCESS OF BUTTOCK: ICD-10-CM

## 2024-02-09 DIAGNOSIS — L02.519 CELLULITIS AND ABSCESS OF HAND: Primary | ICD-10-CM

## 2024-02-09 DIAGNOSIS — L03.317 CELLULITIS AND ABSCESS OF BUTTOCK: ICD-10-CM

## 2024-02-09 PROBLEM — M54.40 ACUTE BACK PAIN WITH SCIATICA: Status: RESOLVED | Noted: 2023-09-15 | Resolved: 2024-02-09

## 2024-02-09 PROCEDURE — 99214 OFFICE O/P EST MOD 30 MIN: CPT | Performed by: FAMILY MEDICINE

## 2024-02-09 PROCEDURE — 3078F DIAST BP <80 MM HG: CPT | Performed by: FAMILY MEDICINE

## 2024-02-09 PROCEDURE — 1036F TOBACCO NON-USER: CPT | Performed by: FAMILY MEDICINE

## 2024-02-09 PROCEDURE — 3075F SYST BP GE 130 - 139MM HG: CPT | Performed by: FAMILY MEDICINE

## 2024-02-09 PROCEDURE — 1159F MED LIST DOCD IN RCRD: CPT | Performed by: FAMILY MEDICINE

## 2024-02-09 PROCEDURE — 1160F RVW MEDS BY RX/DR IN RCRD: CPT | Performed by: FAMILY MEDICINE

## 2024-02-09 PROCEDURE — 1125F AMNT PAIN NOTED PAIN PRSNT: CPT | Performed by: FAMILY MEDICINE

## 2024-02-09 RX ORDER — SULFAMETHOXAZOLE AND TRIMETHOPRIM 800; 160 MG/1; MG/1
1 TABLET ORAL 2 TIMES DAILY
Qty: 20 TABLET | Refills: 0 | Status: SHIPPED | OUTPATIENT
Start: 2024-02-09 | End: 2024-02-19

## 2024-02-09 ASSESSMENT — PAIN SCALES - GENERAL: PAINLEVEL: 7

## 2024-02-09 NOTE — PATIENT INSTRUCTIONS
Problem List Items Addressed This Visit             ICD-10-CM    Cellulitis and abscess of buttock L02.31, L03.317     - Mild sacral irritation noted bordering stage II ulcer to which we will cover with the oral antibiotic at this time  -Continue to focus on lifestyle modification including frequent repositioning and good hygiene to avoid skin irritation  -If needed, OTC products such as colopaste moisture barrier can be helpful though I would recommend we link up with wound care again if symptoms persist/progress         Relevant Medications    sulfamethoxazole-trimethoprim (Bactrim DS) 800-160 mg tablet    Cellulitis and abscess of hand - Primary L03.119, L02.519     - Will cover with oral antibiotic for perceived cellulitis  -Warm compresses advocated and keeping area clean and dry with warm water and mild soap  -If area opens up and drains, recommend applying topical antibiotic ointment with bacitracin packets were given in office today         Relevant Medications    sulfamethoxazole-trimethoprim (Bactrim DS) 800-160 mg tablet

## 2024-02-09 NOTE — PROGRESS NOTES
Outpatient Visit Note    Chief Complaint   Patient presents with    Pain     Pain on butt for a few months getting worse. Saw pain management yesterday and he looked at it. There was no open wound and was suspected to be cellulitis. Pt is waiting on insurance to approve a new spine implant.          HPI:  Estrella Villafana is a 88 y.o. female with a past medical history significant for controlled diabetes, hypertension, hyperlipidemia, anxiety/depression, IBS, vitamin-D deficiency and cervical/lumbar degenerative disc disease currently followed by Dr. Ann of pain management and sacral wound followed wound care who presents to the office secondary to concerns of sore/wound on buttocks.  She was last seen in the office on 11/13/2023 for 3 month follow-up.           She had previously been established with Dr. Wells for primary care, with prior established relationship with Dr. Reyes whom she last saw in 2018.           Blood work panel recently completed on 8/11/2023 including A1c, CBC, CMP, lipid panel and TSH which was remarkable for A1c of 5.8% and mildly low platelets.    Sacral wound:  In review, the patient presented to the [facility] emergency department on 01/08/2023 secondary to complaints of abscess of rectum. She was ultimately diagnosed with cellulitis with perception that abscess had successfully drained. She was started on regimen of clindamycin 300 mg 3 times a day and discharged home with wound care instructions and recommendations for outpatient follow-up with PCP. At time initial appointment she reported compliance with clindamycin noting improvement with redness/swelling. Had assistance from family with changing dressing daily. Patient continued to have prominently open wound with significant granulation tissue with overt drainage reported. Referral was placed to [facility]. Patient had successful follow-through with wound care center with resolved sacral ulcer.  Continued  monitoring and supportive care was advocated along with plans to focus on mobility with chronic right hip pain.  Did have hip injection through rheumatology which provided no symptom relief.  Continues to focus on repositioning with no active irritation of her sacrum.    During follow-up with her established pain management team on 2/8/2024 she reported swelling in her right hand for approximately 1 week.  Specialist evaluated with concerns for possible cellulitis.  At that time patient was encouraged to proceed to urgent care for evaluation and possible initiation of antibiotics.  Today she reports progressive swelling without drainage on the dorsal aspect of right hand.  Area has dramatically increased in size over the last several days.  States the area is slightly tender though she has more discomfort intermittently in her sacral region.  This pain has been going on for the last several weeks.  Has been monitoring the area with no appreciated skin breakdown or drainage.  Did attempt to reach out to the wound care center though they closed her case and are not actively following her at this time.  No reports of other systemic complaints such as or chills.    Current Medications  Current Outpatient Medications   Medication Instructions    acetaminophen (Tylenol Arthritis Pain) 650 mg ER tablet Tylenol Arthritis Pain    amLODIPine (Norvasc) 5 mg tablet 1 tablet, oral, Daily    biotin 5 mg, oral, Daily    busPIRone (BUSPAR) 15 mg, oral, 2 times daily    calcium carbonate (CALTRATE 600 ORAL) 600 mg, oral, Daily    cholecalciferol (VITAMIN D3) 50 mcg, oral, Daily    ezetimibe (Zetia) 10 mg tablet 1 tablet, oral, Daily    ferrous sulfate 65 mg, oral, 3 times daily with meals, Do not crush, chew, or split.    fluocinonide (Lidex) 0.05 % external solution 1 Application, Topical, As needed    folic acid/multivit-min/lutein (CENTRUM SILVER ORAL) 1 tablet, oral, Daily    gabapentin (Neurontin) 100 mg capsule Take 1 capsule  "(100 mg) by mouth once daily at bedtime for 14 days, THEN 1 capsule (100 mg) 2 times a day for 16 days.    glipiZIDE XL (Glucotrol XL) 5 mg 24 hr tablet 1 tablet, oral, Daily, With food    ibuprofen 200 mg, oral, Every 6 hours PRN    lactobacillus (Culturelle) 10 billion cell capsule 1 capsule, oral, Daily    loperamide (Imodium A-D) 2 mg tablet 1 tablet, oral, As needed    losartan (Cozaar) 50 mg tablet 1 tablet, oral, Daily    sulfamethoxazole-trimethoprim (Bactrim DS) 800-160 mg tablet 1 tablet, oral, 2 times daily    traMADol (ULTRAM) 50 mg, oral, 2 times daily    vitamins A,C,E-zinc-copper (PreserVision AREDS) 4,296 mcg-226 mg-90 mg capsule 1 capsule, oral, 2 times daily        Allergies  Allergies   Allergen Reactions    Atorvastatin Other and Myalgia     Joint pain    Lactose GI Upset    Dexamethasone Myalgia    Lisinopril Myalgia and Unknown    Triamterene-Hydrochlorothiazid Myalgia    Cephalexin Diarrhea        Past Medical History:   Diagnosis Date    Anxiety     Cellulitis of finger of left hand 09/15/2023    Hyperglycemia     Hyperlipidemia     Hypertension     Lactose intolerance     Osteoarthritis     Sciatica     Spinal stenosis       Past Surgical History:   Procedure Laterality Date    ADENOIDECTOMY      CHOLECYSTECTOMY  08/28/2014    ROBOTIC SINGLE PORT LAPAROSCOPIC    OTHER SURGICAL HISTORY      Spinal implant    SPINE SURGERY  08/23/2022    \"TUBE IN SPINE\"    TONSILLECTOMY       Family History   Problem Relation Name Age of Onset    Hypertension Mother      Stroke Mother      Stroke Father      Retinal detachment Other females      Social History     Tobacco Use    Smoking status: Never    Smokeless tobacco: Never   Vaping Use    Vaping Use: Never used   Substance Use Topics    Alcohol use: Never    Drug use: Never       ROS  All pertinent positive symptoms are included in the history of present illness.  All other systems have been reviewed and are negative and noncontributory to this patient's " current ailments.    VITAL SIGNS  Vitals:    02/09/24 1131   BP: 134/62   Pulse: 80   Temp: 36.8 °C (98.3 °F)   SpO2: 97%         PHYSICAL EXAM  GENERAL APPEARANCE: alert and oriented, Pleasant and cooperative, No Acute Distress  HEENT: EOMI, PERRLA, MMM  SKIN: Mildly fluctuant nontender erythematous nodule dorsum of right hand over fourth MCP joint, visible head with no active drainage.  Perisacral erythematous patches with no visible skin ulceration though mild surrounding edema  NEUROLOGIC EXAM: non-focal exam  MUSCULOSKELETAL: no gross abnormalities  PSYCH: affect is normal, eye contact is good      Assessment/Plan   Problem List Items Addressed This Visit             ICD-10-CM    Cellulitis and abscess of buttock L02.31, L03.317     - Mild sacral irritation noted bordering stage II ulcer to which we will cover with the oral antibiotic at this time  -Continue to focus on lifestyle modification including frequent repositioning and good hygiene to avoid skin irritation  -If needed, OTC products such as colopaste moisture barrier can be helpful though I would recommend we link up with wound care again if symptoms persist/progress         Relevant Medications    sulfamethoxazole-trimethoprim (Bactrim DS) 800-160 mg tablet    Cellulitis and abscess of hand - Primary L03.119, L02.519     - Will cover with oral antibiotic for perceived cellulitis  -Warm compresses advocated and keeping area clean and dry with warm water and mild soap  -If area opens up and drains, recommend applying topical antibiotic ointment with bacitracin packets were given in office today         Relevant Medications    sulfamethoxazole-trimethoprim (Bactrim DS) 800-160 mg tablet

## 2024-02-09 NOTE — ASSESSMENT & PLAN NOTE
- Mild sacral irritation noted bordering stage II ulcer to which we will cover with the oral antibiotic at this time  -Continue to focus on lifestyle modification including frequent repositioning and good hygiene to avoid skin irritation  -If needed, OTC products such as colopaste moisture barrier can be helpful though I would recommend we link up with wound care again if symptoms persist/progress

## 2024-02-16 ENCOUNTER — OFFICE VISIT (OUTPATIENT)
Dept: PRIMARY CARE | Facility: CLINIC | Age: 89
End: 2024-02-16
Payer: MEDICARE

## 2024-02-16 VITALS
WEIGHT: 149.2 LBS | TEMPERATURE: 97.9 F | DIASTOLIC BLOOD PRESSURE: 72 MMHG | SYSTOLIC BLOOD PRESSURE: 118 MMHG | BODY MASS INDEX: 25.47 KG/M2 | OXYGEN SATURATION: 99 % | HEIGHT: 64 IN | HEART RATE: 78 BPM

## 2024-02-16 DIAGNOSIS — L02.31 CELLULITIS AND ABSCESS OF BUTTOCK: Primary | ICD-10-CM

## 2024-02-16 DIAGNOSIS — L03.317 CELLULITIS AND ABSCESS OF BUTTOCK: Primary | ICD-10-CM

## 2024-02-16 DIAGNOSIS — L03.119 CELLULITIS AND ABSCESS OF HAND: ICD-10-CM

## 2024-02-16 DIAGNOSIS — L02.519 CELLULITIS AND ABSCESS OF HAND: ICD-10-CM

## 2024-02-16 PROCEDURE — 1158F ADVNC CARE PLAN TLK DOCD: CPT | Performed by: FAMILY MEDICINE

## 2024-02-16 PROCEDURE — 1159F MED LIST DOCD IN RCRD: CPT | Performed by: FAMILY MEDICINE

## 2024-02-16 PROCEDURE — 1036F TOBACCO NON-USER: CPT | Performed by: FAMILY MEDICINE

## 2024-02-16 PROCEDURE — 99214 OFFICE O/P EST MOD 30 MIN: CPT | Performed by: FAMILY MEDICINE

## 2024-02-16 PROCEDURE — 3074F SYST BP LT 130 MM HG: CPT | Performed by: FAMILY MEDICINE

## 2024-02-16 PROCEDURE — 1123F ACP DISCUSS/DSCN MKR DOCD: CPT | Performed by: FAMILY MEDICINE

## 2024-02-16 PROCEDURE — 1125F AMNT PAIN NOTED PAIN PRSNT: CPT | Performed by: FAMILY MEDICINE

## 2024-02-16 PROCEDURE — 3078F DIAST BP <80 MM HG: CPT | Performed by: FAMILY MEDICINE

## 2024-02-16 PROCEDURE — 1160F RVW MEDS BY RX/DR IN RCRD: CPT | Performed by: FAMILY MEDICINE

## 2024-02-16 RX ORDER — CLINDAMYCIN HYDROCHLORIDE 300 MG/1
300 CAPSULE ORAL 2 TIMES DAILY
Qty: 20 CAPSULE | Refills: 0 | Status: SHIPPED | OUTPATIENT
Start: 2024-02-16 | End: 2024-02-26

## 2024-02-16 ASSESSMENT — PATIENT HEALTH QUESTIONNAIRE - PHQ9
SUM OF ALL RESPONSES TO PHQ9 QUESTIONS 1 AND 2: 0
1. LITTLE INTEREST OR PLEASURE IN DOING THINGS: NOT AT ALL
2. FEELING DOWN, DEPRESSED OR HOPELESS: NOT AT ALL

## 2024-02-16 ASSESSMENT — PAIN SCALES - GENERAL: PAINLEVEL: 5

## 2024-02-16 NOTE — ASSESSMENT & PLAN NOTE
- With wound having now open, will plan for formal consultation with wound clinic  -We will additionally changed to clindamycin which had worked well before  -Will monitor tolerance to guide with hopeful improvement in constipation/nausea  -Continue to focus on good daily hydration

## 2024-02-16 NOTE — PATIENT INSTRUCTIONS
Problem List Items Addressed This Visit             ICD-10-CM    Cellulitis and abscess of buttock - Primary L02.31, L03.317     - With wound having now open, will plan for formal consultation with wound clinic  -We will additionally changed to clindamycin which had worked well before  -Will monitor tolerance to guide with hopeful improvement in constipation/nausea  -Continue to focus on good daily hydration         Relevant Medications    clindamycin (Cleocin) 300 mg capsule    Other Relevant Orders    Referral to Wound Clinic    Cellulitis and abscess of hand L03.119, L02.519     - Will cover with oral antibiotic for perceived cellulitis  -Warm compresses advocated and keeping area clean and dry with warm water and mild soap  -If area opens up and drains, recommend applying topical antibiotic ointment with bacitracin packets were given in office today         Relevant Medications    clindamycin (Cleocin) 300 mg capsule     Possible good

## 2024-02-16 NOTE — ASSESSMENT & PLAN NOTE
- Will cover with oral antibiotic for perceived cellulitis  -Warm compresses advocated and keeping area clean and dry with warm water and mild soap  -If area opens up and drains, recommend applying topical antibiotic ointment with bacitracin packets were given in office today

## 2024-02-16 NOTE — PROGRESS NOTES
Outpatient Visit Note    Chief Complaint   Patient presents with    Constipation     Pt has had some constipation since starting antibiotic. Took some stool softeners that helped slightly. Started feeling nauseated and low appetite a few days ago. Has been trying to eat toast         HPI:  Estrella Villafana is a 88 y.o. female with a past medical history significant for controlled diabetes, hypertension, hyperlipidemia, anxiety/depression, IBS, vitamin-D deficiency and cervical/lumbar degenerative disc disease currently followed by Dr. Ann of pain management and sacral wound followed wound care who presents to the office in follow-up for recent hand/buttock abscess/cellulitis to which she was seen in the office in 2/9/2024.  At that time, supportive care was advocated along with oral Bactrim DS.  Plan was set that if symptoms persisted, wound care referral will be placed.           Blood work panel last completed on 8/11/2023 including A1c, CBC, CMP, lipid panel and TSH which was remarkable for A1c of 5.8% and mildly low platelets.    In review, the patient presented to the [facility] emergency department on 01/08/2023 secondary to complaints of abscess of rectum. She was ultimately diagnosed with cellulitis with perception that abscess had successfully drained. She was started on regimen of clindamycin 300 mg 3 times a day and discharged home with wound care instructions and recommendations for outpatient follow-up with PCP. At time initial appointment she reported compliance with clindamycin noting improvement with redness/swelling. Had assistance from family with changing dressing daily. Patient continued to have prominently open wound with significant granulation tissue with overt drainage reported. Referral was placed to [facility] Care Center. Patient had successful follow-through with wound care center with resolved sacral ulcer.  Continued monitoring and supportive care was advocated along with plans to  focus on mobility with chronic right hip pain.  Did have hip injection through rheumatology which provided no symptom relief.  Continues to focus on repositioning with no active irritation of her sacrum.    During follow-up with her established pain management team on 2/8/2024 she reported swelling in her right hand for approximately 2 weeks.  Specialist evaluated with concerns for possible cellulitis.  At that time patient was encouraged to proceed to urgent care for evaluation and possible initiation of antibiotics.  At last encounter she reported progressive swelling without drainage on the dorsal aspect of right hand.  Area had dramatically increased in size over the last several days.  Stated the area was slightly tender though she has more discomfort intermittently in her sacral region.  Pain had been going on for the last several weeks.  Had been monitoring the area with no appreciated skin breakdown or drainage.  Attempted to reach out to the wound care center though they closed her case and are not actively following her at this time.  No reports of other systemic complaints such as or chills.    Today she reports increased constipation and nausea since starting Bactrim.  Did have mild discomfort to which she utilized Colace to have a bowel movement.  Has had continued redness/prominence of right hand abscess.  Denies any active drainage.  Has been applying bacitracin though denies warm compresses.  As noted continued sacral irritation with sensation of drainage.  Denies any fever or chills    Current Medications  Current Outpatient Medications   Medication Instructions    acetaminophen (Tylenol Arthritis Pain) 650 mg ER tablet Tylenol Arthritis Pain    amLODIPine (Norvasc) 5 mg tablet 1 tablet, oral, Daily    biotin 5 mg, oral, Daily    busPIRone (BUSPAR) 15 mg, oral, 2 times daily    calcium carbonate (CALTRATE 600 ORAL) 600 mg, oral, Daily    cholecalciferol (VITAMIN D3) 50 mcg, oral, Daily     "clindamycin (CLEOCIN) 300 mg, oral, 2 times daily    ezetimibe (Zetia) 10 mg tablet 1 tablet, oral, Daily    ferrous sulfate 65 mg, oral, 3 times daily with meals, Do not crush, chew, or split.    fluocinonide (Lidex) 0.05 % external solution 1 Application, Topical, As needed    folic acid/multivit-min/lutein (CENTRUM SILVER ORAL) 1 tablet, oral, Daily    gabapentin (Neurontin) 100 mg capsule Take 1 capsule (100 mg) by mouth once daily at bedtime for 14 days, THEN 1 capsule (100 mg) 2 times a day for 16 days.    glipiZIDE XL (Glucotrol XL) 5 mg 24 hr tablet 1 tablet, oral, Daily, With food    ibuprofen 200 mg, oral, Every 6 hours PRN    lactobacillus (Culturelle) 10 billion cell capsule 1 capsule, oral, Daily    loperamide (Imodium A-D) 2 mg tablet 1 tablet, oral, As needed    losartan (Cozaar) 50 mg tablet 1 tablet, oral, Daily    sulfamethoxazole-trimethoprim (Bactrim DS) 800-160 mg tablet 1 tablet, oral, 2 times daily    traMADol (ULTRAM) 50 mg, oral, 2 times daily    vitamins A,C,E-zinc-copper (PreserVision AREDS) 4,296 mcg-226 mg-90 mg capsule 1 capsule, oral, 2 times daily        Allergies  Allergies   Allergen Reactions    Atorvastatin Other and Myalgia     Joint pain    Lactose GI Upset    Dexamethasone Myalgia    Lisinopril Myalgia and Unknown    Triamterene-Hydrochlorothiazid Myalgia    Cephalexin Diarrhea        Past Medical History:   Diagnosis Date    Anxiety     Cellulitis of finger of left hand 09/15/2023    Hyperglycemia     Hyperlipidemia     Hypertension     Lactose intolerance     Osteoarthritis     Sciatica     Spinal stenosis       Past Surgical History:   Procedure Laterality Date    ADENOIDECTOMY      CHOLECYSTECTOMY  08/28/2014    ROBOTIC SINGLE PORT LAPAROSCOPIC    OTHER SURGICAL HISTORY      Spinal implant    SPINE SURGERY  08/23/2022    \"TUBE IN SPINE\"    TONSILLECTOMY       Family History   Problem Relation Name Age of Onset    Hypertension Mother      Stroke Mother      Stroke Father   "    Retinal detachment Other females      Social History     Tobacco Use    Smoking status: Never    Smokeless tobacco: Never   Vaping Use    Vaping Use: Never used   Substance Use Topics    Alcohol use: Never    Drug use: Never       ROS  All pertinent positive symptoms are included in the history of present illness.  All other systems have been reviewed and are negative and noncontributory to this patient's current ailments.    VITAL SIGNS  Vitals:    02/16/24 1116   BP: 118/72   Pulse: 78   Temp: 36.6 °C (97.9 °F)   SpO2: 99%       PHYSICAL EXAM  GENERAL APPEARANCE: alert and oriented, Pleasant and cooperative, No Acute Distress  HEENT: EOMI, PERRLA, MMM  SKIN: Indurated nontender erythematous nodule dorsum of right hand over fourth MCP joint with no active drainage.  Perisacral erythematous ulceration with mild surrounding edema  ABDOMEN: soft, non-tender, no organomegaly, no masses palpated, no guarding or rigidity  NEUROLOGIC EXAM: non-focal exam  MUSCULOSKELETAL: no gross abnormalities  PSYCH: affect is normal, eye contact is good      Assessment/Plan   Problem List Items Addressed This Visit             ICD-10-CM    Cellulitis and abscess of buttock - Primary L02.31, L03.317     - With wound having now open, will plan for formal consultation with wound clinic  -We will additionally changed to clindamycin which had worked well before  -Will monitor tolerance to guide with hopeful improvement in constipation/nausea  -Continue to focus on good daily hydration         Relevant Medications    clindamycin (Cleocin) 300 mg capsule    Other Relevant Orders    Referral to Wound Clinic    Cellulitis and abscess of hand L03.119, L02.519     - Will cover with oral antibiotic for perceived cellulitis  -Warm compresses advocated and keeping area clean and dry with warm water and mild soap  -If area opens up and drains, recommend applying topical antibiotic ointment with bacitracin packets were given in office today          Relevant Medications    clindamycin (Cleocin) 300 mg capsule

## 2024-02-19 ENCOUNTER — OFFICE VISIT (OUTPATIENT)
Dept: WOUND CARE | Facility: HOSPITAL | Age: 89
End: 2024-02-19
Payer: MEDICARE

## 2024-02-19 DIAGNOSIS — L02.31 CELLULITIS AND ABSCESS OF BUTTOCK: ICD-10-CM

## 2024-02-19 DIAGNOSIS — L03.317 CELLULITIS AND ABSCESS OF BUTTOCK: ICD-10-CM

## 2024-02-19 PROCEDURE — 1159F MED LIST DOCD IN RCRD: CPT | Performed by: NURSE PRACTITIONER

## 2024-02-19 PROCEDURE — 99213 OFFICE O/P EST LOW 20 MIN: CPT | Performed by: NURSE PRACTITIONER

## 2024-02-19 PROCEDURE — 1160F RVW MEDS BY RX/DR IN RCRD: CPT | Performed by: NURSE PRACTITIONER

## 2024-02-19 PROCEDURE — 1036F TOBACCO NON-USER: CPT | Performed by: NURSE PRACTITIONER

## 2024-02-19 PROCEDURE — 1125F AMNT PAIN NOTED PAIN PRSNT: CPT | Performed by: NURSE PRACTITIONER

## 2024-02-19 PROCEDURE — 99215 OFFICE O/P EST HI 40 MIN: CPT

## 2024-02-20 ENCOUNTER — HOME HEALTH ADMISSION (OUTPATIENT)
Dept: HOME HEALTH SERVICES | Facility: HOME HEALTH | Age: 89
End: 2024-02-20
Payer: MEDICARE

## 2024-02-20 ENCOUNTER — TELEPHONE (OUTPATIENT)
Dept: PAIN MEDICINE | Facility: CLINIC | Age: 89
End: 2024-02-20
Payer: MEDICARE

## 2024-02-20 ENCOUNTER — TELEPHONE (OUTPATIENT)
Dept: PRIMARY CARE | Facility: CLINIC | Age: 89
End: 2024-02-20
Payer: MEDICARE

## 2024-02-20 ENCOUNTER — DOCUMENTATION (OUTPATIENT)
Dept: HOME HEALTH SERVICES | Facility: HOME HEALTH | Age: 89
End: 2024-02-20
Payer: MEDICARE

## 2024-02-20 NOTE — TELEPHONE ENCOUNTER
Telephone call to patient. VM left for patient that Dr. Ann said it was ok for her to try the Baclofen she has at home. Advised patient to call the office for any further questions or concerns.

## 2024-02-20 NOTE — HH CARE COORDINATION
Home Care received a Referral for Nursing. We have processed the referral for a Start of Care on 2/21-2/22.     If you have any questions or concerns, please feel free to contact us at [phone]. Follow the prompts, enter your five digit zip code, and you will be directed to your care team on [facility].

## 2024-02-20 NOTE — TELEPHONE ENCOUNTER
Telephone call from patient stating she is experiencing a stiff back. She reports she had a cold and she thinks it affected her back. She is also on atb and seeing wound care for cellulitis of her buttocks. She reports having Baclofen at home that you previously prescribed her and she would like to know if she can take that to help with her back.

## 2024-02-20 NOTE — TELEPHONE ENCOUNTER
PT IN OFFICE LAST FRIDAY NOW STATING SHE IS HAVING TROUBLE SLEEPING UP EVERY HOUR WOULD LIKE TO TALK TO MD REGARDING THIS IF HE COULD PRESCRIBE SOMETHING FOR HER OR OVER THE COUNTER

## 2024-02-20 NOTE — TELEPHONE ENCOUNTER
With recent onset sleep issues, would recommend patient initiate trial of OTC melatonin with close monitoring of symptoms.  If sleep issues continue, can set up office follow-up to discuss potential medication trials or to determine if there are other driving factors

## 2024-02-22 NOTE — TELEPHONE ENCOUNTER
Spoke with pt states she has otc melatonin 3 mg and will give that a try and if it does not help within a week or two then she will set up apt.

## 2024-02-23 ENCOUNTER — HOME CARE VISIT (OUTPATIENT)
Dept: HOME HEALTH SERVICES | Facility: HOME HEALTH | Age: 89
End: 2024-02-23
Payer: MEDICARE

## 2024-02-23 VITALS
DIASTOLIC BLOOD PRESSURE: 74 MMHG | OXYGEN SATURATION: 99 % | RESPIRATION RATE: 12 BRPM | HEART RATE: 80 BPM | TEMPERATURE: 98.5 F | SYSTOLIC BLOOD PRESSURE: 128 MMHG

## 2024-02-23 PROCEDURE — 0023 HH SOC

## 2024-02-23 PROCEDURE — G0299 HHS/HOSPICE OF RN EA 15 MIN: HCPCS

## 2024-02-23 ASSESSMENT — PAIN SCALES - PAIN ASSESSMENT IN ADVANCED DEMENTIA (PAINAD)
FACIALEXPRESSION: 0
BREATHING: 0
NEGVOCALIZATION: 0
BODYLANGUAGE: 0
FACIALEXPRESSION: 0 - SMILING OR INEXPRESSIVE.
NEGVOCALIZATION: 0 - NONE.
CONSOLABILITY: 0 - NO NEED TO CONSOLE.
CONSOLABILITY: 0
TOTALSCORE: 0
BODYLANGUAGE: 0 - RELAXED.

## 2024-02-23 ASSESSMENT — ENCOUNTER SYMPTOMS
SHORTNESS OF BREATH: 1
PAIN: 1
LAST BOWEL MOVEMENT: 66893
LOWEST PAIN SEVERITY IN PAST 24 HOURS: 6/10
PAIN LOCATION - PAIN SEVERITY: 6/10
PAIN LOCATION - PAIN DURATION: VARIES
CHANGE IN APPETITE: UNCHANGED
PERSON REPORTING PAIN: PATIENT
LOSS OF SENSATION IN FEET: 0
PAIN LOCATION - EXACERBATING FACTORS: WALKING, STANDING
DEPRESSION: 0
BOWEL PATTERN NORMAL: 1
FATIGUE: 1
APPETITE LEVEL: GOOD
PAIN LOCATION - PAIN FREQUENCY: CONSTANT
OCCASIONAL FEELINGS OF UNSTEADINESS: 1
PAIN LOCATION - RELIEVING FACTORS: REST
HIGHEST PAIN SEVERITY IN PAST 24 HOURS: 9/10
EYE WATERING: 1
SUBJECTIVE PAIN PROGRESSION: UNCHANGED
PAIN LOCATION - PAIN QUALITY: ACHE
PAIN LOCATION: BACK
STOOL FREQUENCY: LESS THAN DAILY
DRY SKIN: 1
PAIN SEVERITY GOAL: 0/10
MUSCLE WEAKNESS: 1

## 2024-02-23 ASSESSMENT — ACTIVITIES OF DAILY LIVING (ADL)
AMBULATION ASSISTANCE: ONE PERSON
ENTERING_EXITING_HOME: MINIMUM ASSIST
OASIS_M1830: 05
AMBULATION ASSISTANCE: 1

## 2024-02-26 ENCOUNTER — OFFICE VISIT (OUTPATIENT)
Dept: WOUND CARE | Facility: HOSPITAL | Age: 89
End: 2024-02-26
Payer: MEDICARE

## 2024-02-26 PROCEDURE — 11042 DBRDMT SUBQ TIS 1ST 20SQCM/<: CPT

## 2024-02-26 PROCEDURE — 11042 DBRDMT SUBQ TIS 1ST 20SQCM/<: CPT | Performed by: NURSE PRACTITIONER

## 2024-02-28 ENCOUNTER — HOME CARE VISIT (OUTPATIENT)
Dept: HOME HEALTH SERVICES | Facility: HOME HEALTH | Age: 89
End: 2024-02-28
Payer: MEDICARE

## 2024-02-28 VITALS
HEART RATE: 72 BPM | TEMPERATURE: 98.9 F | DIASTOLIC BLOOD PRESSURE: 68 MMHG | OXYGEN SATURATION: 99 % | RESPIRATION RATE: 16 BRPM | SYSTOLIC BLOOD PRESSURE: 130 MMHG

## 2024-02-28 PROCEDURE — G0299 HHS/HOSPICE OF RN EA 15 MIN: HCPCS

## 2024-02-28 ASSESSMENT — PAIN SCALES - PAIN ASSESSMENT IN ADVANCED DEMENTIA (PAINAD)
NEGVOCALIZATION: 0
TOTALSCORE: 0
CONSOLABILITY: 0 - NO NEED TO CONSOLE.
FACIALEXPRESSION: 0
FACIALEXPRESSION: 0 - SMILING OR INEXPRESSIVE.
BODYLANGUAGE: 0
BODYLANGUAGE: 0 - RELAXED.
BREATHING: 0
NEGVOCALIZATION: 0 - NONE.
CONSOLABILITY: 0

## 2024-02-28 ASSESSMENT — ENCOUNTER SYMPTOMS
BOWEL PATTERN NORMAL: 1
APPETITE LEVEL: GOOD
STOOL FREQUENCY: LESS THAN DAILY
HIGHEST PAIN SEVERITY IN PAST 24 HOURS: 9/10
PAIN LOCATION: BACK
PAIN LOCATION - PAIN SEVERITY: 7/10
SUBJECTIVE PAIN PROGRESSION: UNCHANGED
OCCASIONAL FEELINGS OF UNSTEADINESS: 1
DEPRESSION: 0
FATIGUE: 1
PAIN SEVERITY GOAL: 0/10
LOWEST PAIN SEVERITY IN PAST 24 HOURS: 5/10
DRY SKIN: 1
LAST BOWEL MOVEMENT: 66898
PAIN: 1
CHANGE IN APPETITE: UNCHANGED

## 2024-02-28 ASSESSMENT — ACTIVITIES OF DAILY LIVING (ADL)
AMBULATION ASSISTANCE: STAND BY ASSIST
AMBULATION ASSISTANCE: 1
MONEY MANAGEMENT (EXPENSES/BILLS): INDEPENDENT

## 2024-03-01 ENCOUNTER — HOME CARE VISIT (OUTPATIENT)
Dept: HOME HEALTH SERVICES | Facility: HOME HEALTH | Age: 89
End: 2024-03-01
Payer: MEDICARE

## 2024-03-01 VITALS
HEART RATE: 68 BPM | SYSTOLIC BLOOD PRESSURE: 140 MMHG | OXYGEN SATURATION: 99 % | DIASTOLIC BLOOD PRESSURE: 80 MMHG | TEMPERATURE: 98.7 F | RESPIRATION RATE: 16 BRPM

## 2024-03-01 PROCEDURE — G0180 MD CERTIFICATION HHA PATIENT: HCPCS | Performed by: NURSE PRACTITIONER

## 2024-03-01 PROCEDURE — G0299 HHS/HOSPICE OF RN EA 15 MIN: HCPCS

## 2024-03-01 ASSESSMENT — PAIN SCALES - PAIN ASSESSMENT IN ADVANCED DEMENTIA (PAINAD)
NEGVOCALIZATION: 0 - NONE.
FACIALEXPRESSION: 0 - SMILING OR INEXPRESSIVE.
TOTALSCORE: 0
BODYLANGUAGE: 0
FACIALEXPRESSION: 0
BODYLANGUAGE: 0 - RELAXED.
NEGVOCALIZATION: 0
BREATHING: 0
CONSOLABILITY: 0 - NO NEED TO CONSOLE.
CONSOLABILITY: 0

## 2024-03-01 ASSESSMENT — ENCOUNTER SYMPTOMS
DIZZINESS: 1
LAST BOWEL MOVEMENT: 66900
PAIN SEVERITY GOAL: 0/10
HIGHEST PAIN SEVERITY IN PAST 24 HOURS: 9/10
PAIN LOCATION: BACK
FORGETFULNESS: 1
SHORTNESS OF BREATH: 1
OCCASIONAL FEELINGS OF UNSTEADINESS: 1
FATIGUE: 1
LOWEST PAIN SEVERITY IN PAST 24 HOURS: 5/10
DEPRESSION: 0
BOWEL PATTERN NORMAL: 1
LOSS OF SENSATION IN FEET: 0
STOOL FREQUENCY: DAILY
CHANGE IN APPETITE: UNCHANGED
APPETITE LEVEL: GOOD
SUBJECTIVE PAIN PROGRESSION: UNCHANGED
PAIN: 1
PAIN LOCATION - PAIN SEVERITY: 5/10
DRY SKIN: 1

## 2024-03-01 ASSESSMENT — ACTIVITIES OF DAILY LIVING (ADL)
MONEY MANAGEMENT (EXPENSES/BILLS): INDEPENDENT
AMBULATION ASSISTANCE: 1
AMBULATION ASSISTANCE: STAND BY ASSIST

## 2024-03-04 ENCOUNTER — OFFICE VISIT (OUTPATIENT)
Dept: PAIN MEDICINE | Facility: CLINIC | Age: 89
End: 2024-03-04
Payer: MEDICARE

## 2024-03-04 ENCOUNTER — PHARMACY VISIT (OUTPATIENT)
Dept: PHARMACY | Facility: CLINIC | Age: 89
End: 2024-03-04
Payer: COMMERCIAL

## 2024-03-04 VITALS
DIASTOLIC BLOOD PRESSURE: 76 MMHG | WEIGHT: 150 LBS | HEART RATE: 87 BPM | HEIGHT: 64 IN | SYSTOLIC BLOOD PRESSURE: 138 MMHG | BODY MASS INDEX: 25.61 KG/M2 | RESPIRATION RATE: 20 BRPM

## 2024-03-04 DIAGNOSIS — M51.36 DDD (DEGENERATIVE DISC DISEASE), LUMBAR: Primary | ICD-10-CM

## 2024-03-04 DIAGNOSIS — G89.29 OTHER CHRONIC PAIN: ICD-10-CM

## 2024-03-04 DIAGNOSIS — M48.062 SPINAL STENOSIS, LUMBAR REGION, WITH NEUROGENIC CLAUDICATION: ICD-10-CM

## 2024-03-04 DIAGNOSIS — M54.16 LUMBAR RADICULOPATHY: ICD-10-CM

## 2024-03-04 PROCEDURE — 1036F TOBACCO NON-USER: CPT | Performed by: ANESTHESIOLOGY

## 2024-03-04 PROCEDURE — 1125F AMNT PAIN NOTED PAIN PRSNT: CPT | Performed by: ANESTHESIOLOGY

## 2024-03-04 PROCEDURE — 1159F MED LIST DOCD IN RCRD: CPT | Performed by: ANESTHESIOLOGY

## 2024-03-04 PROCEDURE — 99214 OFFICE O/P EST MOD 30 MIN: CPT | Performed by: ANESTHESIOLOGY

## 2024-03-04 PROCEDURE — RXMED WILLOW AMBULATORY MEDICATION CHARGE

## 2024-03-04 PROCEDURE — 3078F DIAST BP <80 MM HG: CPT | Performed by: ANESTHESIOLOGY

## 2024-03-04 PROCEDURE — 3075F SYST BP GE 130 - 139MM HG: CPT | Performed by: ANESTHESIOLOGY

## 2024-03-04 PROCEDURE — 1160F RVW MEDS BY RX/DR IN RCRD: CPT | Performed by: ANESTHESIOLOGY

## 2024-03-04 RX ORDER — TRAMADOL HYDROCHLORIDE 50 MG/1
50 TABLET ORAL EVERY 6 HOURS PRN
Qty: 60 TABLET | Refills: 1 | Status: SHIPPED | OUTPATIENT
Start: 2024-03-04 | End: 2024-05-23

## 2024-03-04 ASSESSMENT — ENCOUNTER SYMPTOMS
CONSTITUTIONAL NEGATIVE: 1
ALLERGIC/IMMUNOLOGIC NEGATIVE: 1
RESPIRATORY NEGATIVE: 1
ENDOCRINE NEGATIVE: 1
ARTHRALGIAS: 1
MYALGIAS: 1
CARDIOVASCULAR NEGATIVE: 1
HEMATOLOGIC/LYMPHATIC NEGATIVE: 1
GASTROINTESTINAL NEGATIVE: 1
PSYCHIATRIC NEGATIVE: 1
BACK PAIN: 1
WEAKNESS: 1
EYES NEGATIVE: 1

## 2024-03-04 ASSESSMENT — LIFESTYLE VARIABLES
HOW OFTEN DO YOU HAVE A DRINK CONTAINING ALCOHOL: NEVER
AUDIT-C TOTAL SCORE: 0
HOW MANY STANDARD DRINKS CONTAINING ALCOHOL DO YOU HAVE ON A TYPICAL DAY: PATIENT DOES NOT DRINK
SKIP TO QUESTIONS 9-10: 1
HOW OFTEN DO YOU HAVE SIX OR MORE DRINKS ON ONE OCCASION: NEVER

## 2024-03-04 ASSESSMENT — PAIN SCALES - GENERAL
PAINLEVEL: 9
PAINLEVEL_OUTOF10: 9

## 2024-03-04 ASSESSMENT — PAIN - FUNCTIONAL ASSESSMENT: PAIN_FUNCTIONAL_ASSESSMENT: 0-10

## 2024-03-04 ASSESSMENT — PAIN DESCRIPTION - DESCRIPTORS: DESCRIPTORS: SHARP

## 2024-03-04 NOTE — PROGRESS NOTES
SPO2 95    MEDICATION NAME: Tramadol  STRENGTH: 50mg  LAST FILL DATE: 24  DATE LAST TAKEN: 3/3/24  QUANTITY FILLED: 14  QUANTITY REMAININ  COUNT COMPLETED BY: CODIE Steinberg and Velasquez RN

## 2024-03-04 NOTE — PROGRESS NOTES
Subjective   Patient ID: Estrella Villafana is a 88 y.o. female who presents for Back Pain.  Back Pain  Associated symptoms include weakness.   Patient here today for follow-up and management of her chronic low back and right lower extremity pain secondary to severe spinal stenosis with neurogenic claudication as well as severe degenerative disc disease with spondylolisthesis.  The patient is pending authorization for posterior lateral arthrodesis of the L4-5 level with her insurance company.  She is here today with her daughter.  She continues to see severe disability with limited ability to stand and walk.  She is hopeful that the procedure will get approved soon as her functional ability is very limited at this time.  She is concerned about her longevity of living independently because of her pain.  She has had surgical consultations in the past and is not a surgical candidate because of the severity of her disease and her age.  She has found tramadol to be helpful for her.  She takes in the evening time which helps her get sleep.  She is worried about taking it in during the daytime because of any potential side effects.  She only takes it during the daytime when her pain is severe.  She was offered trazodone by her home health nurse and she was concerned about taking the 2 medications together.    Review of Systems   Constitutional: Negative.    HENT: Negative.     Eyes: Negative.    Respiratory: Negative.     Cardiovascular: Negative.    Gastrointestinal: Negative.    Endocrine: Negative.    Genitourinary: Negative.    Musculoskeletal:  Positive for arthralgias, back pain and myalgias.   Skin: Negative.    Allergic/Immunologic: Negative.    Neurological:  Positive for weakness.   Hematological: Negative.    Psychiatric/Behavioral: Negative.         Objective   Physical Exam  Vitals and nursing note reviewed.   Constitutional:       Appearance: Normal appearance.   HENT:      Head: Normocephalic and atraumatic.       Right Ear: Ear canal and external ear normal.      Left Ear: Ear canal and external ear normal.      Nose: Nose normal.      Mouth/Throat:      Mouth: Mucous membranes are moist.      Pharynx: Oropharynx is clear.   Eyes:      Conjunctiva/sclera: Conjunctivae normal.      Pupils: Pupils are equal, round, and reactive to light.   Cardiovascular:      Rate and Rhythm: Normal rate.   Pulmonary:      Effort: Pulmonary effort is normal. No respiratory distress.   Musculoskeletal:      Cervical back: Normal range of motion and neck supple.      Lumbar back: Deformity (kyphotic) and tenderness present. Decreased range of motion. Negative left straight leg raise test. No scoliosis.      Right hip: Crepitus present. Decreased range of motion.   Skin:     General: Skin is warm and dry.   Neurological:      Mental Status: She is alert.      Motor: Motor function is intact.      Coordination: Coordination is intact.      Gait: Gait abnormal (lumbar flexed).   Psychiatric:         Mood and Affect: Mood normal.         Thought Content: Thought content normal.         Assessment/Plan   Problem List Items Addressed This Visit             ICD-10-CM       Neuro    DDD (degenerative disc disease), lumbar - Primary M51.36     Other Visit Diagnoses         Codes    Spinal stenosis, lumbar region, with neurogenic claudication     M48.062    Lumbar radiculopathy     M54.16             I nice discussion with the patient today our plan will be as follows.    Radiology: All available imaging was reviewed today.    Physically: Patient should continue home exercise program.    Psychologically: No issues at this time.    Medication: I will refill the patient's opioids today for 2 month.  The patient continues to see benefit and improvement in their quality of life and ability to maintain ADLs.  Patient educated about the risks of taking opioids and operating a motor vehicle.  Patient reports no adverse side effects to current medication  regimen.  Current regimen does allow patient to maintain ADLs.  Patient reports no new neurologic symptoms, new pain areas, or exacerbation in pain today.  Patient reports they are happy with current treatment care path.    [place] was reviewed and was consistent with the history.    Patient has been educated on the risks, benefits, and alternatives of controlled substances as well as the proper way to store these medications.  The patient and I discussed the nature of this medication and its side effects.  We discussed tolerance, physical dependence, psychological dependence, addiction and opioid-induced hyperalgesia.  We discussed the potential need to wean from this medication.  We discussed the availability of programs that can help with this process if necessary.  We discussed safety issues related to opioids including safe storage.  We discussed the fact that the patient should not drive an automobile or operate heavy machinery while taking this medication.  A prescription for naloxone was offered to the patient.  The patient will be re-evaluated for the need to continue opioid therapy in 60-90 days.  An opiate agreement and care management plan has been assigned with the patient.  All previous urine drug screens and saliva drug screens were reviewed today and are compliant with the patient's prescriptive history.    I did advise against the patient taking trazodone and tramadol together.  In geriatric population she could have adverse side effects of these medications.        Duration: Greater than 1 year.    Intervention: Patient pending approval for posterior lateral arthrodesis at L4-5.  Once patient is approved we will get her set up with preadmission testing and get her scheduled for surgery.      Star Ann MD 03/04/24 11:26 AM

## 2024-03-05 ENCOUNTER — OFFICE VISIT (OUTPATIENT)
Dept: PRIMARY CARE | Facility: CLINIC | Age: 89
End: 2024-03-05
Payer: MEDICARE

## 2024-03-05 VITALS
HEART RATE: 88 BPM | WEIGHT: 152 LBS | BODY MASS INDEX: 25.95 KG/M2 | OXYGEN SATURATION: 100 % | SYSTOLIC BLOOD PRESSURE: 114 MMHG | HEIGHT: 64 IN | TEMPERATURE: 98.3 F | DIASTOLIC BLOOD PRESSURE: 62 MMHG

## 2024-03-05 DIAGNOSIS — R22.31 SUBCUTANEOUS NODULE OF RIGHT HAND: Primary | ICD-10-CM

## 2024-03-05 DIAGNOSIS — G47.09 OTHER INSOMNIA: ICD-10-CM

## 2024-03-05 DIAGNOSIS — L89.153: ICD-10-CM

## 2024-03-05 DIAGNOSIS — H69.92 ACUTE DYSFUNCTION OF LEFT EUSTACHIAN TUBE: ICD-10-CM

## 2024-03-05 PROCEDURE — 3074F SYST BP LT 130 MM HG: CPT | Performed by: FAMILY MEDICINE

## 2024-03-05 PROCEDURE — 1159F MED LIST DOCD IN RCRD: CPT | Performed by: FAMILY MEDICINE

## 2024-03-05 PROCEDURE — 99214 OFFICE O/P EST MOD 30 MIN: CPT | Performed by: FAMILY MEDICINE

## 2024-03-05 PROCEDURE — 1036F TOBACCO NON-USER: CPT | Performed by: FAMILY MEDICINE

## 2024-03-05 PROCEDURE — 3078F DIAST BP <80 MM HG: CPT | Performed by: FAMILY MEDICINE

## 2024-03-05 PROCEDURE — 1160F RVW MEDS BY RX/DR IN RCRD: CPT | Performed by: FAMILY MEDICINE

## 2024-03-05 PROCEDURE — 1125F AMNT PAIN NOTED PAIN PRSNT: CPT | Performed by: FAMILY MEDICINE

## 2024-03-05 RX ORDER — FLUTICASONE PROPIONATE 50 MCG
1 SPRAY, SUSPENSION (ML) NASAL DAILY
Qty: 16 G | Refills: 1 | Status: SHIPPED | OUTPATIENT
Start: 2024-03-05 | End: 2025-03-05

## 2024-03-05 ASSESSMENT — PATIENT HEALTH QUESTIONNAIRE - PHQ9
SUM OF ALL RESPONSES TO PHQ9 QUESTIONS 1 AND 2: 0
2. FEELING DOWN, DEPRESSED OR HOPELESS: NOT AT ALL
1. LITTLE INTEREST OR PLEASURE IN DOING THINGS: NOT AT ALL

## 2024-03-05 ASSESSMENT — PAIN SCALES - GENERAL: PAINLEVEL: 5

## 2024-03-05 NOTE — ASSESSMENT & PLAN NOTE
- With persistent symptoms on hand with no signs of active infection, I do believe there is elements of the potential subcutaneous nodule to which we will have consultation with hand specialist; referral placed  -We will additionally obtain x-ray of hand  -May continue with warm compresses as needed for symptom relief

## 2024-03-05 NOTE — ASSESSMENT & PLAN NOTE
- Believe that your insomnia does seem to be triggered by underlying chronic pain issues as you have had symptom improvement utilizing tramadol before bed  -Can continue on this regimen as guided by prescriptions from pain management  -Can additionally see how symptoms evolve if you are able to have pain procedure as scheduled  -Sleep handout given

## 2024-03-05 NOTE — PATIENT INSTRUCTIONS
Problem List Items Addressed This Visit             ICD-10-CM    Decubitus ulcer of sacral region, stage 3 (CMS/[facility]) L89.153     - Continue with wound care follow-up per protocol         Subcutaneous nodule of right hand - Primary R22.31     - With persistent symptoms on hand with no signs of active infection, I do believe there is elements of the potential subcutaneous nodule to which we will have consultation with hand specialist; referral placed  -We will additionally obtain x-ray of hand  -May continue with warm compresses as needed for symptom relief         Relevant Orders    Referral to Orthopaedic Surgery    XR hand right 1-2 views    Other insomnia G47.09     - Believe that your insomnia does seem to be triggered by underlying chronic pain issues as you have had symptom improvement utilizing tramadol before bed  -Can continue on this regimen as guided by prescriptions from pain management  -Can additionally see how symptoms evolve if you are able to have pain procedure as scheduled  -Sleep handout given         Acute dysfunction of left eustachian tube H69.92     - Fluid visualized behind eardrum though no active infection  -Recommend utilization of generic Flonase nasal spray with prescription sent to pharmacy         Relevant Medications    fluticasone (Flonase) 50 mcg/actuation nasal spray

## 2024-03-05 NOTE — PROGRESS NOTES
Outpatient Visit Note    Chief Complaint   Patient presents with    Insomnia     Pt has been taking tramadol for pain at night and sleep has gotten better.     Abscess     Still has abscess on hand. Wound clinic suggested seeing ortho. Pt has been using warm compresses and has helped. Does not look like there is an infection anymore.         HPI:  Estrella Villafana is a 88 y.o. female with a past medical history significant for controlled diabetes, hypertension, hyperlipidemia, anxiety/depression, IBS, vitamin-D deficiency and cervical/lumbar degenerative disc disease currently followed by Dr. Ann of pain management and sacral wound followed wound care who presents to the office secondary to complaints of sleep issues.  She was last seen in the office on 2/16/2024 in follow-up for recent hand/buttock abscess/cellulitis to which she was seen in the office in 2/9/2024.  At the time of last encounter, she was given wound care referral with adjusted oral antibiotic regimen.           Blood work panel last completed on 8/11/2023 including A1c, CBC, CMP, lipid panel and TSH which was remarkable for A1c of 5.8% and mildly low platelets.    In interval, patient did successfully coordinate with wound care clinic, she is following with.  Did contact office on 2/20/2023 reporting sleeping issues.  At that time, trial of OTC melatonin was recommended.  Today she reports ongoing issues with sustaining sleep.  Typically wakes multiple times at night.  Was placed on tramadol regimen by pain management though unable to tolerate during the day secondary to grogginess.  Has recently been encouraged to take in the evenings noting improved sleep patterns.  Continues to follow regularly as they are looking to schedule procedural intervention.    In interval, patient did have follow-up with wound care with progressive improvement of sacral ulcer.  Has been able to tolerate most recent antibiotic course with no reports of  diarrhea.    She does report left-sided ear fullness/irritation.  Denies any drainage or disequilibrium.    Lastly, redness and warmth of the nodule in hand has improved with antibiotic use though she continues to have prominence.  Hand was evaluated by wound care center who recommended consultation with orthopedics.  Patient does have prior history of arthritic nodules and subcutaneous cyst.    Current Medications  Current Outpatient Medications   Medication Instructions    acetaminophen (Tylenol Arthritis Pain) 650 mg ER tablet Tylenol Arthritis Pain    acetaminophen (TYLENOL) 650 mg, oral, Every 6 hours PRN    amLODIPine (Norvasc) 5 mg tablet 1 tablet, oral, Daily    baclofen (LIORESAL) 10 mg, oral, Daily PRN    biotin 5 mg, oral, Daily    busPIRone (BUSPAR) 15 mg, oral, 2 times daily    calcium carbonate (CALTRATE 600 ORAL) 600 mg, oral, Daily    cholecalciferol (VITAMIN D3) 50 mcg, oral, Daily    ezetimibe (Zetia) 10 mg tablet 1 tablet, oral, Daily    ferrous sulfate 65 mg, oral, 3 times daily with meals, Do not crush, chew, or split.    fluocinonide (Lidex) 0.05 % external solution 1 Application, Topical, As needed    fluticasone (Flonase) 50 mcg/actuation nasal spray 1 spray, Each Nostril, Daily, Shake gently. Before first use, prime pump. After use, clean tip and replace cap.    folic acid/multivit-min/lutein (CENTRUM SILVER ORAL) 1 tablet, oral, Daily    gabapentin (Neurontin) 100 mg capsule Take 1 capsule (100 mg) by mouth once daily at bedtime for 14 days, THEN 1 capsule (100 mg) 2 times a day for 16 days.    glipiZIDE XL (Glucotrol XL) 5 mg 24 hr tablet 1 tablet, oral, Daily, With food    ibuprofen 200 mg, oral, Every 6 hours PRN    lactobacillus (Culturelle) 10 billion cell capsule 1 capsule, oral, Daily    loperamide (Imodium A-D) 2 mg tablet 1 tablet, oral, As needed    losartan (Cozaar) 50 mg tablet 1 tablet, oral, Daily    melatonin 3 mg, oral, Nightly    traMADol (ULTRAM) 50 mg, oral, 2 times daily  "PRN    traMADol (ULTRAM) 50 mg, oral, Every 6 hours PRN    vitamins A,C,E-zinc-copper (PreserVision AREDS) 4,296 mcg-226 mg-90 mg capsule 1 capsule, oral, 2 times daily        Allergies  Allergies   Allergen Reactions    Atorvastatin Other and Myalgia     Joint pain    Lactose GI Upset    Dexamethasone Myalgia    Lisinopril Myalgia and Unknown    Triamterene-Hydrochlorothiazid Myalgia    Cephalexin Diarrhea        Past Medical History:   Diagnosis Date    Anxiety     Cellulitis of finger of left hand 09/15/2023    Hyperglycemia     Hyperlipidemia     Hypertension     Lactose intolerance     Osteoarthritis     Sciatica     Spinal stenosis       Past Surgical History:   Procedure Laterality Date    ADENOIDECTOMY      CHOLECYSTECTOMY  08/28/2014    ROBOTIC SINGLE PORT LAPAROSCOPIC    OTHER SURGICAL HISTORY      Spinal implant    SPINE SURGERY  08/23/2022    \"TUBE IN SPINE\"    TONSILLECTOMY       Family History   Problem Relation Name Age of Onset    Hypertension Mother      Stroke Mother      Stroke Father      Retinal detachment Other females      Social History     Tobacco Use    Smoking status: Never    Smokeless tobacco: Never   Vaping Use    Vaping Use: Never used   Substance Use Topics    Alcohol use: Never    Drug use: Never       ROS  All pertinent positive symptoms are included in the history of present illness.  All other systems have been reviewed and are negative and noncontributory to this patient's current ailments.    VITAL SIGNS  Vitals:    03/05/24 1131   BP: 114/62   Pulse: 88   Temp: 36.8 °C (98.3 °F)   SpO2: 100%         PHYSICAL EXAM  GENERAL APPEARANCE: alert and oriented, Pleasant and cooperative, No Acute Distress  HEENT: EOMI, PERRLA, mildly bulging left TM with visible transudate, MMM  SKIN: Indurated nontender erythematous nodule dorsum of right hand over fourth MCP joint with no active drainage  ABDOMEN: soft, non-tender, no organomegaly, no masses palpated, no guarding or " rigidity  NEUROLOGIC EXAM: non-focal exam  MUSCULOSKELETAL: no gross abnormalities  PSYCH: affect is normal, eye contact is good      Assessment/Plan   Problem List Items Addressed This Visit             ICD-10-CM    Decubitus ulcer of sacral region, stage 3 (CMS/[facility]) L89.153     - Continue with wound care follow-up per protocol         Subcutaneous nodule of right hand - Primary R22.31     - With persistent symptoms on hand with no signs of active infection, I do believe there is elements of the potential subcutaneous nodule to which we will have consultation with hand specialist; referral placed  -We will additionally obtain x-ray of hand  -May continue with warm compresses as needed for symptom relief         Relevant Orders    Referral to Orthopaedic Surgery    XR hand right 1-2 views    Other insomnia G47.09     - Believe that your insomnia does seem to be triggered by underlying chronic pain issues as you have had symptom improvement utilizing tramadol before bed  -Can continue on this regimen as guided by prescriptions from pain management  -Can additionally see how symptoms evolve if you are able to have pain procedure as scheduled  -Sleep handout given         Acute dysfunction of left eustachian tube H69.92     - Fluid visualized behind eardrum though no active infection  -Recommend utilization of generic Flonase nasal spray with prescription sent to pharmacy         Relevant Medications    fluticasone (Flonase) 50 mcg/actuation nasal spray

## 2024-03-05 NOTE — ASSESSMENT & PLAN NOTE
- Fluid visualized behind eardrum though no active infection  -Recommend utilization of generic Flonase nasal spray with prescription sent to pharmacy

## 2024-03-06 ENCOUNTER — HOME CARE VISIT (OUTPATIENT)
Dept: HOME HEALTH SERVICES | Facility: HOME HEALTH | Age: 89
End: 2024-03-06
Payer: MEDICARE

## 2024-03-06 VITALS
RESPIRATION RATE: 18 BRPM | OXYGEN SATURATION: 98 % | HEART RATE: 86 BPM | DIASTOLIC BLOOD PRESSURE: 62 MMHG | TEMPERATURE: 98.7 F | SYSTOLIC BLOOD PRESSURE: 130 MMHG

## 2024-03-06 PROCEDURE — G0300 HHS/HOSPICE OF LPN EA 15 MIN: HCPCS

## 2024-03-06 ASSESSMENT — ENCOUNTER SYMPTOMS
LAST BOWEL MOVEMENT: 66905
DENIES PAIN: 1
APPETITE LEVEL: GOOD
CHANGE IN APPETITE: UNCHANGED

## 2024-03-08 ENCOUNTER — HOME CARE VISIT (OUTPATIENT)
Dept: HOME HEALTH SERVICES | Facility: HOME HEALTH | Age: 89
End: 2024-03-08
Payer: MEDICARE

## 2024-03-08 VITALS
SYSTOLIC BLOOD PRESSURE: 150 MMHG | DIASTOLIC BLOOD PRESSURE: 82 MMHG | RESPIRATION RATE: 18 BRPM | TEMPERATURE: 98.7 F | OXYGEN SATURATION: 99 % | HEART RATE: 78 BPM

## 2024-03-08 PROCEDURE — G0300 HHS/HOSPICE OF LPN EA 15 MIN: HCPCS

## 2024-03-08 ASSESSMENT — ENCOUNTER SYMPTOMS
APPETITE LEVEL: GOOD
LAST BOWEL MOVEMENT: 66906
DENIES PAIN: 1
CHANGE IN APPETITE: UNCHANGED

## 2024-03-11 ENCOUNTER — OFFICE VISIT (OUTPATIENT)
Dept: WOUND CARE | Facility: HOSPITAL | Age: 89
End: 2024-03-11
Payer: MEDICARE

## 2024-03-11 PROCEDURE — 11042 DBRDMT SUBQ TIS 1ST 20SQCM/<: CPT | Performed by: NURSE PRACTITIONER

## 2024-03-11 PROCEDURE — 11042 DBRDMT SUBQ TIS 1ST 20SQCM/<: CPT

## 2024-03-13 ENCOUNTER — HOME CARE VISIT (OUTPATIENT)
Dept: HOME HEALTH SERVICES | Facility: HOME HEALTH | Age: 89
End: 2024-03-13
Payer: MEDICARE

## 2024-03-13 VITALS
OXYGEN SATURATION: 97 % | RESPIRATION RATE: 18 BRPM | HEART RATE: 80 BPM | TEMPERATURE: 98.7 F | SYSTOLIC BLOOD PRESSURE: 148 MMHG | DIASTOLIC BLOOD PRESSURE: 72 MMHG

## 2024-03-13 PROCEDURE — G0300 HHS/HOSPICE OF LPN EA 15 MIN: HCPCS

## 2024-03-13 ASSESSMENT — ENCOUNTER SYMPTOMS
DENIES PAIN: 1
APPETITE LEVEL: GOOD
LAST BOWEL MOVEMENT: 66912
CHANGE IN APPETITE: UNCHANGED

## 2024-03-15 ENCOUNTER — HOME CARE VISIT (OUTPATIENT)
Dept: HOME HEALTH SERVICES | Facility: HOME HEALTH | Age: 89
End: 2024-03-15
Payer: MEDICARE

## 2024-03-15 VITALS
HEART RATE: 68 BPM | OXYGEN SATURATION: 99 % | TEMPERATURE: 98.6 F | SYSTOLIC BLOOD PRESSURE: 130 MMHG | RESPIRATION RATE: 16 BRPM | DIASTOLIC BLOOD PRESSURE: 84 MMHG

## 2024-03-15 PROCEDURE — G0299 HHS/HOSPICE OF RN EA 15 MIN: HCPCS

## 2024-03-15 ASSESSMENT — ACTIVITIES OF DAILY LIVING (ADL)
AMBULATION ASSISTANCE: ONE PERSON
MONEY MANAGEMENT (EXPENSES/BILLS): INDEPENDENT
AMBULATION ASSISTANCE: 1

## 2024-03-15 ASSESSMENT — ENCOUNTER SYMPTOMS
HIGHEST PAIN SEVERITY IN PAST 24 HOURS: 9/10
FORGETFULNESS: 1
APPETITE LEVEL: GOOD
SHORTNESS OF BREATH: 1
DEPRESSION: 0
LAST BOWEL MOVEMENT: 66913
MUSCLE WEAKNESS: 1
FATIGUE: 1
LOSS OF SENSATION IN FEET: 0
PAIN LOCATION: BACK
PAIN LOCATION - PAIN SEVERITY: 5/10
CHANGE IN APPETITE: UNCHANGED
DRY SKIN: 1
STOOL FREQUENCY: LESS THAN DAILY
BOWEL PATTERN NORMAL: 1
DYSPNEA ACTIVITY LEVEL: AFTER AMBULATING 10 - 20 FT
LOWEST PAIN SEVERITY IN PAST 24 HOURS: 4/10
PAIN SEVERITY GOAL: 0/10
PAIN: 1
SUBJECTIVE PAIN PROGRESSION: UNCHANGED
OCCASIONAL FEELINGS OF UNSTEADINESS: 1

## 2024-03-15 ASSESSMENT — PAIN SCALES - PAIN ASSESSMENT IN ADVANCED DEMENTIA (PAINAD)
NEGVOCALIZATION: 0 - NONE.
BODYLANGUAGE: 0
CONSOLABILITY: 0 - NO NEED TO CONSOLE.
BODYLANGUAGE: 0 - RELAXED.
TOTALSCORE: 0
CONSOLABILITY: 0
BREATHING: 0
FACIALEXPRESSION: 0 - SMILING OR INEXPRESSIVE.
NEGVOCALIZATION: 0
FACIALEXPRESSION: 0

## 2024-03-18 ENCOUNTER — OFFICE VISIT (OUTPATIENT)
Dept: WOUND CARE | Facility: HOSPITAL | Age: 89
End: 2024-03-18
Payer: MEDICARE

## 2024-03-18 PROCEDURE — 99213 OFFICE O/P EST LOW 20 MIN: CPT | Performed by: NURSE PRACTITIONER

## 2024-03-18 PROCEDURE — 99213 OFFICE O/P EST LOW 20 MIN: CPT

## 2024-03-20 ENCOUNTER — HOME CARE VISIT (OUTPATIENT)
Dept: HOME HEALTH SERVICES | Facility: HOME HEALTH | Age: 89
End: 2024-03-20
Payer: MEDICARE

## 2024-03-22 ENCOUNTER — HOME CARE VISIT (OUTPATIENT)
Dept: HOME HEALTH SERVICES | Facility: HOME HEALTH | Age: 89
End: 2024-03-22
Payer: MEDICARE

## 2024-03-22 VITALS
HEART RATE: 72 BPM | DIASTOLIC BLOOD PRESSURE: 68 MMHG | TEMPERATURE: 98.7 F | SYSTOLIC BLOOD PRESSURE: 132 MMHG | OXYGEN SATURATION: 97 % | RESPIRATION RATE: 16 BRPM

## 2024-03-22 PROCEDURE — G0299 HHS/HOSPICE OF RN EA 15 MIN: HCPCS

## 2024-03-22 ASSESSMENT — ENCOUNTER SYMPTOMS
PAIN LOCATION - EXACERBATING FACTORS: MOVEMENT
PAIN LOCATION - PAIN QUALITY: PAINFUL
PAIN LOCATION - PAIN SEVERITY: 6/10
PAIN LOCATION - RELIEVING FACTORS: REST
PAIN LOCATION: LEFT SHOULDER
PAIN LOCATION - PAIN FREQUENCY: INTERMITTENT
HIGHEST PAIN SEVERITY IN PAST 24 HOURS: 7/10
SUBJECTIVE PAIN PROGRESSION: UNCHANGED
LOWEST PAIN SEVERITY IN PAST 24 HOURS: 0/10
PAIN SEVERITY GOAL: 0/10
PAIN LOCATION - PAIN DURATION: VARIES

## 2024-03-22 ASSESSMENT — PAIN SCALES - PAIN ASSESSMENT IN ADVANCED DEMENTIA (PAINAD)
CONSOLABILITY: 0 - NO NEED TO CONSOLE.
NEGVOCALIZATION: 0
BODYLANGUAGE: 0
FACIALEXPRESSION: 0 - SMILING OR INEXPRESSIVE.
FACIALEXPRESSION: 0
BODYLANGUAGE: 0 - RELAXED.
NEGVOCALIZATION: 0 - NONE.
TOTALSCORE: 0
BREATHING: 0
CONSOLABILITY: 0

## 2024-03-22 ASSESSMENT — ACTIVITIES OF DAILY LIVING (ADL)
HOME_HEALTH_OASIS: 00
AMBULATION ASSISTANCE: STAND BY ASSIST
OASIS_M1830: 01
AMBULATION ASSISTANCE: 1

## 2024-03-25 ENCOUNTER — APPOINTMENT (OUTPATIENT)
Dept: WOUND CARE | Facility: HOSPITAL | Age: 89
End: 2024-03-25
Payer: MEDICARE

## 2024-04-15 ENCOUNTER — TELEPHONE (OUTPATIENT)
Dept: PAIN MEDICINE | Facility: CLINIC | Age: 89
End: 2024-04-15

## 2024-04-15 ENCOUNTER — OFFICE VISIT (OUTPATIENT)
Dept: PRIMARY CARE | Facility: CLINIC | Age: 89
End: 2024-04-15
Payer: MEDICARE

## 2024-04-15 VITALS
WEIGHT: 151 LBS | OXYGEN SATURATION: 97 % | DIASTOLIC BLOOD PRESSURE: 84 MMHG | TEMPERATURE: 98.1 F | BODY MASS INDEX: 25.78 KG/M2 | HEART RATE: 80 BPM | SYSTOLIC BLOOD PRESSURE: 130 MMHG | HEIGHT: 64 IN

## 2024-04-15 DIAGNOSIS — F41.9 ANXIETY: ICD-10-CM

## 2024-04-15 DIAGNOSIS — M06.4 INFLAMMATORY POLYARTHRITIS (MULTI): ICD-10-CM

## 2024-04-15 DIAGNOSIS — L89.153: ICD-10-CM

## 2024-04-15 DIAGNOSIS — G47.09 OTHER INSOMNIA: ICD-10-CM

## 2024-04-15 DIAGNOSIS — E11.9 TYPE 2 DIABETES MELLITUS WITHOUT COMPLICATION, WITHOUT LONG-TERM CURRENT USE OF INSULIN (MULTI): Primary | ICD-10-CM

## 2024-04-15 DIAGNOSIS — E78.2 MIXED HYPERLIPIDEMIA: ICD-10-CM

## 2024-04-15 DIAGNOSIS — M48.062 SPINAL STENOSIS, LUMBAR REGION WITH NEUROGENIC CLAUDICATION: ICD-10-CM

## 2024-04-15 DIAGNOSIS — K58.0 IRRITABLE BOWEL SYNDROME WITH DIARRHEA: ICD-10-CM

## 2024-04-15 DIAGNOSIS — I10 ESSENTIAL HYPERTENSION: ICD-10-CM

## 2024-04-15 PROBLEM — M06.9 RHEUMATOID ARTHRITIS (MULTI): Status: RESOLVED | Noted: 2023-09-15 | Resolved: 2024-04-15

## 2024-04-15 PROBLEM — E78.5 HYPERLIPIDEMIA: Status: RESOLVED | Noted: 2023-09-15 | Resolved: 2024-04-15

## 2024-04-15 LAB — POC HEMOGLOBIN A1C: 5.6 % (ref 4.2–6.5)

## 2024-04-15 PROCEDURE — 99214 OFFICE O/P EST MOD 30 MIN: CPT | Performed by: FAMILY MEDICINE

## 2024-04-15 PROCEDURE — 1126F AMNT PAIN NOTED NONE PRSNT: CPT | Performed by: FAMILY MEDICINE

## 2024-04-15 PROCEDURE — 3075F SYST BP GE 130 - 139MM HG: CPT | Performed by: FAMILY MEDICINE

## 2024-04-15 PROCEDURE — 1160F RVW MEDS BY RX/DR IN RCRD: CPT | Performed by: FAMILY MEDICINE

## 2024-04-15 PROCEDURE — 1036F TOBACCO NON-USER: CPT | Performed by: FAMILY MEDICINE

## 2024-04-15 PROCEDURE — 83036 HEMOGLOBIN GLYCOSYLATED A1C: CPT | Mod: MUE | Performed by: FAMILY MEDICINE

## 2024-04-15 PROCEDURE — 3079F DIAST BP 80-89 MM HG: CPT | Performed by: FAMILY MEDICINE

## 2024-04-15 PROCEDURE — 1159F MED LIST DOCD IN RCRD: CPT | Performed by: FAMILY MEDICINE

## 2024-04-15 RX ORDER — AMLODIPINE BESYLATE 5 MG/1
5 TABLET ORAL DAILY
Qty: 90 TABLET | Refills: 3 | Status: SHIPPED | OUTPATIENT
Start: 2024-04-15 | End: 2025-04-15

## 2024-04-15 RX ORDER — EZETIMIBE 10 MG/1
10 TABLET ORAL DAILY
Qty: 90 TABLET | Refills: 3 | Status: SHIPPED | OUTPATIENT
Start: 2024-04-15 | End: 2025-04-15

## 2024-04-15 RX ORDER — LOSARTAN POTASSIUM 50 MG/1
50 TABLET ORAL DAILY
Qty: 90 TABLET | Refills: 3 | Status: SHIPPED | OUTPATIENT
Start: 2024-04-15 | End: 2025-04-15

## 2024-04-15 ASSESSMENT — PATIENT HEALTH QUESTIONNAIRE - PHQ9
10. IF YOU CHECKED OFF ANY PROBLEMS, HOW DIFFICULT HAVE THESE PROBLEMS MADE IT FOR YOU TO DO YOUR WORK, TAKE CARE OF THINGS AT HOME, OR GET ALONG WITH OTHER PEOPLE: NOT DIFFICULT AT ALL
SUM OF ALL RESPONSES TO PHQ9 QUESTIONS 1 AND 2: 1
2. FEELING DOWN, DEPRESSED OR HOPELESS: SEVERAL DAYS
1. LITTLE INTEREST OR PLEASURE IN DOING THINGS: NOT AT ALL

## 2024-04-15 ASSESSMENT — PAIN SCALES - GENERAL: PAINLEVEL: 0-NO PAIN

## 2024-04-15 NOTE — TELEPHONE ENCOUNTER
Patient called wanting to know the status of the [employer] implant. She states her incontinence is increasing and she wants the insurance company to know its a serious matter and she needs to get this done.

## 2024-04-15 NOTE — ASSESSMENT & PLAN NOTE
- Believe that your insomnia does seem to be triggered by underlying chronic pain issues as you have had symptom improvement utilizing tramadol before bed  -Can continue on this regimen as guided by prescriptions from pain management

## 2024-04-15 NOTE — ASSESSMENT & PLAN NOTE
- Resolved  -Will continue to monitor for any recurrent symptoms with plans for wound care follow-up as needed

## 2024-04-15 NOTE — ASSESSMENT & PLAN NOTE
- Will monitor cholesterol levels with blood work ordered  -Continue to focus on healthy, low-fat diet

## 2024-04-15 NOTE — PROGRESS NOTES
Outpatient Visit Note    Chief Complaint   Patient presents with    Follow-up     6 month f/u         HPI:  Estrella Villafana is a 88 y.o. female with a past medical history significant for controlled diabetes, hypertension, hyperlipidemia, anxiety/depression, IBS, vitamin-D deficiency and cervical/lumbar degenerative disc disease currently followed by Dr. Ann of pain management and sacral wound followed wound care who presents to the office with for follow-up. She was last seen in the office on 3/5/2024 secondary to ongoing hand nodule issue as well as complaints of insomnia and eustachian tube dysfunction.           She had previously been established with Dr. Wells for primary care, with prior established relationship with Dr. Reyes whom she last saw in 2018.           Blood work panel recently completed on 8/11/2023 including A1c, CBC, CMP, lipid panel and TSH which was remarkable for A1c of 5.8% and mildly low platelets.           Diabetes:  As noted good sugar control on glipizide 5 mg extended release daily.  Was previously on metformin which was discontinued secondary to prominent GI issues.  A1c sugar average showed great control at 5.8% with last blood work completed in August 2023. No reported polyuria, polydipsia, polyphagia or acute vision/sensation changes.           Chronic pain/fullness degenerative disc disease/polyarthralgias:  Continues to follow with with Dr. Ann of pain management. Last visit was on 3/4/2024. Has undergone injection therapy for lumbar degenerative disc disease.  Has additionally followed through with consultation with Dr. Hall of Rheumatology, with main focus being on right hip. Therapeutic injection scheduled with physical therapy encouraged.  Has done well with as needed tramadol which has been closely managed/monitored by pain management.    Sacral wound:  In review, the patient presented to the [facility] emergency department on 01/08/2023 secondary to  complaints of abscess of rectum. She was ultimately diagnosed with cellulitis with perception that abscess had successfully drained. She was started on regimen of clindamycin 300 mg 3 times a day and discharged home with wound care instructions and recommendations for outpatient follow-up with PCP. At time initial appointment she reported compliance with clindamycin noting improvement with redness/swelling. Had assistance from family with changing dressing daily. Patient continued to have prominently open wound with significant granulation tissue with overt drainage reported. [facility]. Patient had successful follow-through with wound care center with resolved sacral ulcer.  Continued monitoring and supportive care was advocated along with plans to focus on mobility with chronic right hip pain.  Did have hip injection through rheumatology which provided no symptom relief.  Continues to focus on repositioning with no active irritation of her sacrum to which she did have more recent recurrent sacral ulceration.  Wound care services were reinitiated.  Has had resolution of symptoms to which she was discharged from wound care approximately 3 weeks ago.  Has kept a sense of how area is doing with no active concerns/complaints           Anxiety/IBS/chronic diarrhea:  History of IBS like symptoms for the past several years, to which symptoms did improve upon removal of metformin.  Continues to utilize probiotics and as needed Imodium.    In regards to anxiety, has continued with BuSpar 15 mg twice daily.  States that regimen has continued to be effective with no reports of adverse side effects.  Does continue to struggle with insomnia but this has been kept in check with as needed nightly tramadol.  Is in the process of awaiting approval for spinal stimulator through pain management.  Has noted intermittent episodes of bowel/bladder dysfunction to which she is planning to reach out to pain  management to emphasize need for procedure.  She separately is scheduled for oral surgery in approximately 1 month           Of note at last encounter, patient continued to have prominent hand nodule which had improved in regards to redness/warmth following antibiotic course.  Orthopedic hand consultation was ultimately given.  And x-rays were completed and interval optimize patient has follow-up in the near future with hand specialist.  Symptoms have gradually improved the nodule persists, perceived to be arthritic in nature.    Current Medications  Current Outpatient Medications   Medication Instructions    acetaminophen (TYLENOL) 650 mg, oral, Every 6 hours PRN    amLODIPine (NORVASC) 5 mg, oral, Daily    baclofen (LIORESAL) 10 mg, oral, Daily PRN    biotin 5 mg, oral, Daily    busPIRone (BUSPAR) 15 mg, oral, 2 times daily    calcium carbonate (CALTRATE 600 ORAL) 600 mg, oral, Daily    cholecalciferol (VITAMIN D3) 50 mcg, oral, Daily    ezetimibe (ZETIA) 10 mg, oral, Daily    fluocinonide (Lidex) 0.05 % external solution 1 Application, Topical, As needed    fluticasone (Flonase) 50 mcg/actuation nasal spray 1 spray, Each Nostril, Daily, Shake gently. Before first use, prime pump. After use, clean tip and replace cap.    folic acid/multivit-min/lutein (CENTRUM SILVER ORAL) 1 tablet, oral, Daily    glipiZIDE XL (Glucotrol XL) 5 mg 24 hr tablet 1 tablet, oral, Daily, With food    ibuprofen 600 mg, oral, 2 times daily    lactobacillus (Culturelle) 10 billion cell capsule 1 capsule, oral, Daily    loperamide (Imodium A-D) 2 mg tablet 1 tablet, oral, As needed    losartan (COZAAR) 50 mg, oral, Daily    traMADol (ULTRAM) 50 mg, oral, Nightly    vitamins A,C,E-zinc-copper (PreserVision AREDS) 4,296 mcg-226 mg-90 mg capsule 1 capsule, oral, 2 times daily        Allergies  Allergies   Allergen Reactions    Atorvastatin Other and Myalgia     Joint pain    Lactose GI Upset    Dexamethasone Myalgia    Lisinopril Myalgia and  "Unknown    Triamterene-Hydrochlorothiazid Myalgia    Cephalexin Diarrhea        Past Medical History:   Diagnosis Date    Anxiety     Cellulitis of finger of left hand 09/15/2023    Hyperglycemia     Hyperlipidemia     Hypertension     Lactose intolerance     Osteoarthritis     Sciatica     Spinal stenosis       Past Surgical History:   Procedure Laterality Date    ADENOIDECTOMY      CHOLECYSTECTOMY  08/28/2014    ROBOTIC SINGLE PORT LAPAROSCOPIC    OTHER SURGICAL HISTORY      Spinal implant    SPINE SURGERY  08/23/2022    \"TUBE IN SPINE\"    TONSILLECTOMY       Family History   Problem Relation Name Age of Onset    Hypertension Mother      Stroke Mother      Stroke Father      Retinal detachment Other females      Social History     Tobacco Use    Smoking status: Never    Smokeless tobacco: Never   Vaping Use    Vaping status: Never Used   Substance Use Topics    Alcohol use: Never    Drug use: Never       ROS  All pertinent positive symptoms are included in the history of present illness.  All other systems have been reviewed and are negative and noncontributory to this patient's current ailments.    VITAL SIGNS  Vitals:    04/15/24 1102   BP: 130/84   Pulse: 80   Temp: 36.7 °C (98.1 °F)   SpO2: 97%         PHYSICAL EXAM  GENERAL APPEARANCE: alert and oriented, Pleasant and cooperative, No Acute Distress  HEENT: EOMI, PERRLA, mild cerumen in left auditory canal, MMM  HEART: RRR, normal S1S2, no murmurs, click or rubs  LUNGS: clear to auscultation bilaterally, no wheezes/rhonchi/rales  EXTREMITIES: no edema, normal ROM  SKIN: normal, no rash, unremarkable  NEUROLOGIC EXAM: non-focal exam  MUSCULOSKELETAL: no gross abnormalities  PSYCH: affect is normal, eye contact is good      Assessment/Plan   Problem List Items Addressed This Visit             ICD-10-CM    Anxiety F41.9     - Stable on current regimen   -We will continue without modification         Essential hypertension I10     - Blood pressure stable in office " today  -Continue on current regimen along with balanced diet and moderation of salt/caffeine         Relevant Medications    losartan (Cozaar) 50 mg tablet    amLODIPine (Norvasc) 5 mg tablet    Other Relevant Orders    CBC    Comprehensive metabolic panel    Lipid panel    Irritable bowel syndrome with diarrhea K58.0     - Stable         Decubitus ulcer of sacral region, stage 3 (Multi) L89.153     - Resolved  -Will continue to monitor for any recurrent symptoms with plans for wound care follow-up as needed         Spinal stenosis, lumbar region with neurogenic claudication M48.062     - Continue with routine pain management follow-up per protocol  -With ongoing pain and intermittent episodes of bowel/bladder dysfunction, please be in contact with pain management with hopes that they would be able to get approval for spinal implant in the near future         Type 2 diabetes mellitus without complication, without long-term current use of insulin (Multi) - Primary E11.9     - A1c performed in office today which was 5.6%. this is even further improved compared to 5.8% in August  -Continue to focus on healthy, low-fat diet with moderation of carbohydrates  -Annual diabetic eye exam advocated         Relevant Orders    POCT glycosylated hemoglobin (Hb A1C) manually resulted (Completed)    Comprehensive metabolic panel    Lipid panel    Albumin, urine, random    Mixed hyperlipidemia E78.2     - Will monitor cholesterol levels with blood work ordered  -Continue to focus on healthy, low-fat diet         Relevant Medications    ezetimibe (Zetia) 10 mg tablet    Other Relevant Orders    Comprehensive metabolic panel    Lipid panel    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

## 2024-04-15 NOTE — ASSESSMENT & PLAN NOTE
- Continue with routine pain management follow-up per protocol  -With ongoing pain and intermittent episodes of bowel/bladder dysfunction, please be in contact with pain management with hopes that they would be able to get approval for spinal implant in the near future

## 2024-04-15 NOTE — ASSESSMENT & PLAN NOTE
- A1c performed in office today which was 5.6%. this is even further improved compared to 5.8% in August  -Continue to focus on healthy, low-fat diet with moderation of carbohydrates  -Annual diabetic eye exam advocated   no cough/no fever/no shortness of breath/no chills

## 2024-04-25 ENCOUNTER — LAB (OUTPATIENT)
Dept: LAB | Facility: LAB | Age: 89
End: 2024-04-25
Payer: MEDICARE

## 2024-04-25 DIAGNOSIS — I10 ESSENTIAL HYPERTENSION: ICD-10-CM

## 2024-04-25 DIAGNOSIS — E78.2 MIXED HYPERLIPIDEMIA: ICD-10-CM

## 2024-04-25 DIAGNOSIS — E11.9 TYPE 2 DIABETES MELLITUS WITHOUT COMPLICATION, WITHOUT LONG-TERM CURRENT USE OF INSULIN (MULTI): ICD-10-CM

## 2024-04-25 LAB
ALBUMIN SERPL-MCNC: 4.2 G/DL (ref 3.5–5)
ALP BLD-CCNC: 67 U/L (ref 35–125)
ALT SERPL-CCNC: 18 U/L (ref 5–40)
ANION GAP SERPL CALC-SCNC: 13 MMOL/L
AST SERPL-CCNC: 23 U/L (ref 5–40)
BILIRUB SERPL-MCNC: 0.6 MG/DL (ref 0.1–1.2)
BUN SERPL-MCNC: 25 MG/DL (ref 8–25)
CALCIUM SERPL-MCNC: 9.5 MG/DL (ref 8.5–10.4)
CHLORIDE SERPL-SCNC: 103 MMOL/L (ref 97–107)
CHOLEST SERPL-MCNC: 194 MG/DL (ref 133–200)
CHOLEST/HDLC SERPL: 3.2 {RATIO}
CO2 SERPL-SCNC: 25 MMOL/L (ref 24–31)
CREAT SERPL-MCNC: 0.6 MG/DL (ref 0.4–1.6)
CREAT UR-MCNC: 57 MG/DL
EGFRCR SERPLBLD CKD-EPI 2021: 88 ML/MIN/1.73M*2
ERYTHROCYTE [DISTWIDTH] IN BLOOD BY AUTOMATED COUNT: 13.5 % (ref 11.5–14.5)
GLUCOSE SERPL-MCNC: 106 MG/DL (ref 65–99)
HCT VFR BLD AUTO: 38.5 % (ref 36–46)
HDLC SERPL-MCNC: 60 MG/DL
HGB BLD-MCNC: 12.5 G/DL (ref 12–16)
LDLC SERPL CALC-MCNC: 106 MG/DL (ref 65–130)
MCH RBC QN AUTO: 33.4 PG (ref 26–34)
MCHC RBC AUTO-ENTMCNC: 32.5 G/DL (ref 32–36)
MCV RBC AUTO: 103 FL (ref 80–100)
MICROALBUMIN UR-MCNC: 22 MG/L (ref 0–23)
MICROALBUMIN/CREAT UR: 38.6 UG/MG CREAT
NRBC BLD-RTO: 0 /100 WBCS (ref 0–0)
PLATELET # BLD AUTO: 148 X10*3/UL (ref 150–450)
POTASSIUM SERPL-SCNC: 4.7 MMOL/L (ref 3.4–5.1)
PROT SERPL-MCNC: 7.5 G/DL (ref 5.9–7.9)
RBC # BLD AUTO: 3.74 X10*6/UL (ref 4–5.2)
SODIUM SERPL-SCNC: 141 MMOL/L (ref 133–145)
TRIGL SERPL-MCNC: 138 MG/DL (ref 40–150)
WBC # BLD AUTO: 4.2 X10*3/UL (ref 4.4–11.3)

## 2024-04-25 PROCEDURE — 82570 ASSAY OF URINE CREATININE: CPT

## 2024-04-25 PROCEDURE — 36415 COLL VENOUS BLD VENIPUNCTURE: CPT

## 2024-04-25 PROCEDURE — 85027 COMPLETE CBC AUTOMATED: CPT

## 2024-04-25 PROCEDURE — 80061 LIPID PANEL: CPT

## 2024-04-25 PROCEDURE — 80053 COMPREHEN METABOLIC PANEL: CPT

## 2024-04-25 PROCEDURE — 82043 UR ALBUMIN QUANTITATIVE: CPT

## 2024-05-08 ENCOUNTER — OFFICE VISIT (OUTPATIENT)
Dept: PAIN MEDICINE | Facility: CLINIC | Age: 89
End: 2024-05-08
Payer: MEDICARE

## 2024-05-08 ENCOUNTER — PHARMACY VISIT (OUTPATIENT)
Dept: PHARMACY | Facility: CLINIC | Age: 89
End: 2024-05-08
Payer: COMMERCIAL

## 2024-05-08 VITALS
OXYGEN SATURATION: 99 % | HEIGHT: 64 IN | DIASTOLIC BLOOD PRESSURE: 74 MMHG | BODY MASS INDEX: 25.78 KG/M2 | HEART RATE: 78 BPM | WEIGHT: 151 LBS | RESPIRATION RATE: 18 BRPM | SYSTOLIC BLOOD PRESSURE: 148 MMHG

## 2024-05-08 DIAGNOSIS — M48.062 SPINAL STENOSIS, LUMBAR REGION WITH NEUROGENIC CLAUDICATION: Primary | ICD-10-CM

## 2024-05-08 PROCEDURE — RXMED WILLOW AMBULATORY MEDICATION CHARGE

## 2024-05-08 PROCEDURE — 1125F AMNT PAIN NOTED PAIN PRSNT: CPT | Performed by: PHYSICIAN ASSISTANT

## 2024-05-08 PROCEDURE — 1159F MED LIST DOCD IN RCRD: CPT | Performed by: PHYSICIAN ASSISTANT

## 2024-05-08 PROCEDURE — 99214 OFFICE O/P EST MOD 30 MIN: CPT | Performed by: PHYSICIAN ASSISTANT

## 2024-05-08 PROCEDURE — 3077F SYST BP >= 140 MM HG: CPT | Performed by: PHYSICIAN ASSISTANT

## 2024-05-08 PROCEDURE — 1036F TOBACCO NON-USER: CPT | Performed by: PHYSICIAN ASSISTANT

## 2024-05-08 PROCEDURE — 1160F RVW MEDS BY RX/DR IN RCRD: CPT | Performed by: PHYSICIAN ASSISTANT

## 2024-05-08 PROCEDURE — 3078F DIAST BP <80 MM HG: CPT | Performed by: PHYSICIAN ASSISTANT

## 2024-05-08 RX ORDER — TRAMADOL HYDROCHLORIDE 50 MG/1
50 TABLET ORAL NIGHTLY
Qty: 10 TABLET | Status: CANCELLED | OUTPATIENT
Start: 2024-05-08

## 2024-05-08 ASSESSMENT — ENCOUNTER SYMPTOMS
EYES NEGATIVE: 1
CONSTITUTIONAL NEGATIVE: 1
ALLERGIC/IMMUNOLOGIC NEGATIVE: 1
BACK PAIN: 1
MYALGIAS: 1
GASTROINTESTINAL NEGATIVE: 1
PSYCHIATRIC NEGATIVE: 1
ENDOCRINE NEGATIVE: 1
CARDIOVASCULAR NEGATIVE: 1
ARTHRALGIAS: 1
RESPIRATORY NEGATIVE: 1
WEAKNESS: 1
HEMATOLOGIC/LYMPHATIC NEGATIVE: 1

## 2024-05-08 ASSESSMENT — PATIENT HEALTH QUESTIONNAIRE - PHQ9
1. LITTLE INTEREST OR PLEASURE IN DOING THINGS: NOT AT ALL
2. FEELING DOWN, DEPRESSED OR HOPELESS: NOT AT ALL
SUM OF ALL RESPONSES TO PHQ9 QUESTIONS 1 & 2: 0

## 2024-05-08 ASSESSMENT — PAIN SCALES - GENERAL
PAINLEVEL_OUTOF10: 4
PAINLEVEL: 4

## 2024-05-08 ASSESSMENT — LIFESTYLE VARIABLES
SKIP TO QUESTIONS 9-10: 1
HOW OFTEN DO YOU HAVE SIX OR MORE DRINKS ON ONE OCCASION: NEVER
HOW OFTEN DO YOU HAVE A DRINK CONTAINING ALCOHOL: NEVER
AUDIT-C TOTAL SCORE: 0
HOW MANY STANDARD DRINKS CONTAINING ALCOHOL DO YOU HAVE ON A TYPICAL DAY: PATIENT DOES NOT DRINK

## 2024-05-08 ASSESSMENT — PAIN - FUNCTIONAL ASSESSMENT: PAIN_FUNCTIONAL_ASSESSMENT: 0-10

## 2024-05-08 NOTE — PROGRESS NOTES
Subjective   Patient ID: Estrella Villafana is a 88 y.o. female who presents for Back Pain.  Patient is an 88-year-old female with spinal stenosis with neurogenic claudication the presents today for follow-up.  Patient continues to have incontinence related issues axial back pain and radiating symptoms.  Patient denies that the tramadol is taken at bedtime.  She uses NSAIDs during the day due to some of the sedative effects related to tramadol.  She denies any adverse reaction she is well aware of the concurrent use of other sedatives and is cautious with his use of this medication.    Back Pain  Associated symptoms include weakness.       Review of Systems   Constitutional: Negative.    HENT: Negative.     Eyes: Negative.    Respiratory: Negative.     Cardiovascular: Negative.    Gastrointestinal: Negative.    Endocrine: Negative.    Genitourinary: Negative.    Musculoskeletal:  Positive for arthralgias, back pain and myalgias.   Skin: Negative.    Allergic/Immunologic: Negative.    Neurological:  Positive for weakness.   Hematological: Negative.    Psychiatric/Behavioral: Negative.         Objective   Physical Exam  Vitals and nursing note reviewed.   Constitutional:       Appearance: Normal appearance.   HENT:      Head: Normocephalic and atraumatic.      Right Ear: Ear canal and external ear normal.      Left Ear: Ear canal and external ear normal.      Nose: Nose normal.      Mouth/Throat:      Mouth: Mucous membranes are moist.      Pharynx: Oropharynx is clear.   Eyes:      Conjunctiva/sclera: Conjunctivae normal.      Pupils: Pupils are equal, round, and reactive to light.   Cardiovascular:      Rate and Rhythm: Normal rate.   Pulmonary:      Effort: Pulmonary effort is normal. No respiratory distress.   Musculoskeletal:      Cervical back: Normal range of motion and neck supple.      Lumbar back: Deformity (kyphotic) and tenderness present. Decreased range of motion. Negative left straight leg raise test. No  scoliosis.      Right hip: Crepitus present. Decreased range of motion.   Skin:     General: Skin is warm and dry.   Neurological:      Mental Status: She is alert.      Motor: Motor function is intact.      Coordination: Coordination is intact.      Gait: Gait abnormal (lumbar flexed).   Psychiatric:         Mood and Affect: Mood normal.         Thought Content: Thought content normal.         Assessment/Plan   Problem List Items Addressed This Visit             ICD-10-CM    Spinal stenosis, lumbar region with neurogenic claudication - Primary M48.062     I had nice discussion with the patient today our plan will be as follows.      Radiology: [ none at this time ]      Physically:  [ continue modification of activities, healthy lifestyle choice ]      Psychologically:  [ No acute psychological concerns ]      Medication: [I will refill the medications at the same dose and frequency. We will continue to monitor the patient every 3 months for compliance, adverse reaction or interations The patient continues to see benefit and improvement in their quality of life and ability to maintain ADLs. Patient educated about the risks of taking opioids and operating a motor vehicle. Patient reports no adverse side effects to current medication regimen. Current regimen does allow patient to maintain ADLs. Oarrs has been reviewed. No suspicion of diversion or abuse. Compliance with medication regime, no use of illicit drugs, no sharing of narcotic medications with others, do not use others narcotic medication, and to avoid alcohol use. Patient has been educated on the risks, benefits, and alternatives of controlled substances as well as the proper way to store these medications.   The patient and I discussed the nature of this medication and its side effects. We discussed tolerance, physical dependence, psychological dependence, addiction and opioid-induced hyperalgesia ]      Duration:  [ 3 months ]      Intervention:  [  awaiting if there is an appeal process for the minute man stabilization.   ]         Rob Elizondo PA-C 05/08/24 12:11 PM

## 2024-05-08 NOTE — PROGRESS NOTES
MEDICATION NAME: Tramadol  STRENGTH: 50mg  LAST FILL DATE: 3/4/24  DATE LAST TAKEN: 24  QUANTITY FILLED: 60  QUANTITY REMAININ  COUNT COMPLETED BY: MAITE MURO and TING Eng      UDS LAST COMPLETED:   CONTROLLED SUBSTANCES AGREEMENT LAST SIGNED:   ORT LAST COMPLETED:  Modified Osw[employer] disability form filled out annually.

## 2024-05-20 ENCOUNTER — OFFICE VISIT (OUTPATIENT)
Dept: PAIN MEDICINE | Facility: CLINIC | Age: 89
End: 2024-05-20
Payer: MEDICARE

## 2024-05-20 VITALS
HEART RATE: 86 BPM | HEIGHT: 64 IN | WEIGHT: 151 LBS | SYSTOLIC BLOOD PRESSURE: 142 MMHG | BODY MASS INDEX: 25.78 KG/M2 | RESPIRATION RATE: 16 BRPM | OXYGEN SATURATION: 100 % | DIASTOLIC BLOOD PRESSURE: 72 MMHG

## 2024-05-20 DIAGNOSIS — M51.36 DDD (DEGENERATIVE DISC DISEASE), LUMBAR: ICD-10-CM

## 2024-05-20 DIAGNOSIS — G89.29 OTHER CHRONIC PAIN: ICD-10-CM

## 2024-05-20 DIAGNOSIS — M48.062 SPINAL STENOSIS, LUMBAR REGION WITH NEUROGENIC CLAUDICATION: Primary | ICD-10-CM

## 2024-05-20 DIAGNOSIS — M47.817 LUMBOSACRAL SPONDYLOSIS WITHOUT MYELOPATHY: ICD-10-CM

## 2024-05-20 DIAGNOSIS — Z98.1 S/P LUMBAR FUSION: ICD-10-CM

## 2024-05-20 PROCEDURE — 99213 OFFICE O/P EST LOW 20 MIN: CPT | Performed by: ANESTHESIOLOGY

## 2024-05-20 PROCEDURE — 3077F SYST BP >= 140 MM HG: CPT | Performed by: ANESTHESIOLOGY

## 2024-05-20 PROCEDURE — 1160F RVW MEDS BY RX/DR IN RCRD: CPT | Performed by: ANESTHESIOLOGY

## 2024-05-20 PROCEDURE — 1159F MED LIST DOCD IN RCRD: CPT | Performed by: ANESTHESIOLOGY

## 2024-05-20 PROCEDURE — 3078F DIAST BP <80 MM HG: CPT | Performed by: ANESTHESIOLOGY

## 2024-05-20 ASSESSMENT — PAIN - FUNCTIONAL ASSESSMENT: PAIN_FUNCTIONAL_ASSESSMENT: 0-10

## 2024-05-20 ASSESSMENT — PAIN SCALES - GENERAL: PAINLEVEL_OUTOF10: 5 - MODERATE PAIN

## 2024-05-20 ASSESSMENT — PAIN DESCRIPTION - DESCRIPTORS: DESCRIPTORS: ACHING

## 2024-05-21 ASSESSMENT — ENCOUNTER SYMPTOMS
CONSTITUTIONAL NEGATIVE: 1
GASTROINTESTINAL NEGATIVE: 1
EYES NEGATIVE: 1
MYALGIAS: 1
RESPIRATORY NEGATIVE: 1
ENDOCRINE NEGATIVE: 1
WEAKNESS: 1
CARDIOVASCULAR NEGATIVE: 1
ALLERGIC/IMMUNOLOGIC NEGATIVE: 1
ARTHRALGIAS: 1
BACK PAIN: 1
PSYCHIATRIC NEGATIVE: 1
HEMATOLOGIC/LYMPHATIC NEGATIVE: 1

## 2024-05-21 NOTE — PROGRESS NOTES
Subjective   Patient ID: Estrella Villafana is a 88 y.o. female who presents for Back Pain.  Back Pain  Associated symptoms include weakness.     Patient here today for management of her chronic low back pain secondary to spinal stenosis.  She has reported that since starting tramadol 1 tablet in the evening time she has been doing much better in the morning.  She is able to get out of bed with little pain.  She then supplements the rest of the day with ibuprofen.  She reports that her pain is much more manageable on this regimen.  She does not want to take more tramadol because she is concerned about any adverse side effects to her bowels or true her mentation as she is very sensitive.  She does question about moving forward with the posterior'lateral athrodesis at L4/5.  She would like to resubmit for approval later in the summer.  Patient's pain is present with standing and walking is improved with sitting down.  Extremes of extension are very painful for her.  Medications are effective without any adverse side effects.    Review of Systems   Constitutional: Negative.    HENT: Negative.     Eyes: Negative.    Respiratory: Negative.     Cardiovascular: Negative.    Gastrointestinal: Negative.    Endocrine: Negative.    Genitourinary: Negative.    Musculoskeletal:  Positive for arthralgias, back pain and myalgias.   Skin: Negative.    Allergic/Immunologic: Negative.    Neurological:  Positive for weakness.   Hematological: Negative.    Psychiatric/Behavioral: Negative.         Objective   Physical Exam  Vitals and nursing note reviewed.   Constitutional:       Appearance: Normal appearance.   HENT:      Head: Normocephalic and atraumatic.      Right Ear: Ear canal and external ear normal.      Left Ear: Ear canal and external ear normal.      Nose: Nose normal.      Mouth/Throat:      Mouth: Mucous membranes are moist.      Pharynx: Oropharynx is clear.   Eyes:      Conjunctiva/sclera: Conjunctivae normal.      Pupils:  Pupils are equal, round, and reactive to light.   Cardiovascular:      Rate and Rhythm: Normal rate.   Pulmonary:      Effort: Pulmonary effort is normal. No respiratory distress.   Musculoskeletal:      Cervical back: Normal range of motion and neck supple.      Lumbar back: Deformity (kyphotic) and tenderness present. Decreased range of motion. Negative left straight leg raise test. No scoliosis.      Right hip: Crepitus present. Decreased range of motion.   Skin:     General: Skin is warm and dry.   Neurological:      Mental Status: She is alert.      Motor: Motor function is intact.      Coordination: Coordination is intact.      Gait: Gait abnormal (lumbar flexed).   Psychiatric:         Mood and Affect: Mood normal.         Thought Content: Thought content normal.         Assessment/Plan   Problem List Items Addressed This Visit             ICD-10-CM    DDD (degenerative disc disease), lumbar M51.36    Other chronic pain G89.29    Spinal stenosis, lumbar region with neurogenic claudication - Primary M48.062     Other Visit Diagnoses         Codes    Lumbosacral spondylosis without myelopathy     M47.817    S/P lumbar fusion     Z98.1        I nice discussion with the patient today our plan will be as follows.    Radiology: All available imaging was reviewed.  Patient suffers with severe degenerative disc disease as well as spinal stenosis at the L4-5 level.    Physically: Maintain home exercise program.    Psychologically: No issues at this time.    Medication: Continue with current regimen of medication.    Duration: Greater than 1 year.    Intervention: We will hold off for another 2 to 3 months and we will resubmit for posterior lateral arthrodesis at L4-5.  The patient has exhausted all other rehabilitative and interventional options at this time.  We could consider spinal cord stimulation.           Star Ann MD 05/21/24 7:55 AM

## 2024-05-24 ENCOUNTER — OFFICE VISIT (OUTPATIENT)
Dept: WOUND CARE | Facility: HOSPITAL | Age: 89
End: 2024-05-24
Payer: MEDICARE

## 2024-05-24 PROCEDURE — 97597 DBRDMT OPN WND 1ST 20 CM/<: CPT

## 2024-05-24 PROCEDURE — 99213 OFFICE O/P EST LOW 20 MIN: CPT | Mod: 25

## 2024-06-07 ENCOUNTER — OFFICE VISIT (OUTPATIENT)
Dept: WOUND CARE | Facility: HOSPITAL | Age: 89
End: 2024-06-07
Payer: MEDICARE

## 2024-06-07 PROCEDURE — 97597 DBRDMT OPN WND 1ST 20 CM/<: CPT

## 2024-06-21 ENCOUNTER — OFFICE VISIT (OUTPATIENT)
Dept: WOUND CARE | Facility: HOSPITAL | Age: 89
End: 2024-06-21
Payer: MEDICARE

## 2024-06-21 PROCEDURE — 99213 OFFICE O/P EST LOW 20 MIN: CPT

## 2024-07-04 DIAGNOSIS — E11.9 TYPE 2 DIABETES MELLITUS WITHOUT COMPLICATION, WITHOUT LONG-TERM CURRENT USE OF INSULIN (MULTI): Primary | ICD-10-CM

## 2024-07-05 ENCOUNTER — TELEPHONE (OUTPATIENT)
Dept: PAIN MEDICINE | Facility: CLINIC | Age: 89
End: 2024-07-05
Payer: MEDICARE

## 2024-07-05 DIAGNOSIS — M51.36 DDD (DEGENERATIVE DISC DISEASE), LUMBAR: ICD-10-CM

## 2024-07-05 DIAGNOSIS — M50.30 DDD (DEGENERATIVE DISC DISEASE), CERVICAL: Primary | ICD-10-CM

## 2024-07-05 PROCEDURE — RXMED WILLOW AMBULATORY MEDICATION CHARGE

## 2024-07-05 RX ORDER — TRAMADOL HYDROCHLORIDE 50 MG/1
50 TABLET ORAL 2 TIMES DAILY
Qty: 60 TABLET | Refills: 0 | Status: SHIPPED | OUTPATIENT
Start: 2024-07-05 | End: 2024-08-04

## 2024-07-05 NOTE — TELEPHONE ENCOUNTER
Pt called requesting a refill for Tramadol.  Pt states she has 2 tablets left.  Her next appointment is with you on 7/15.

## 2024-07-08 RX ORDER — TRAMADOL HYDROCHLORIDE 50 MG/1
50 TABLET ORAL 2 TIMES DAILY
Qty: 60 TABLET | Refills: 0 | Status: SHIPPED | OUTPATIENT
Start: 2024-07-08 | End: 2024-08-07

## 2024-07-08 NOTE — TELEPHONE ENCOUNTER
Pt called stating she has one tablet left of Tramadol and will need a refill.  Pt has an appointment with you on 7/15.  Thanks!

## 2024-07-09 ENCOUNTER — PHARMACY VISIT (OUTPATIENT)
Dept: PHARMACY | Facility: CLINIC | Age: 89
End: 2024-07-09
Payer: COMMERCIAL

## 2024-07-09 RX ORDER — GLIPIZIDE 5 MG/1
5 TABLET, FILM COATED, EXTENDED RELEASE ORAL DAILY
Qty: 90 TABLET | Refills: 3 | Status: SHIPPED | OUTPATIENT
Start: 2024-07-09

## 2024-07-15 ENCOUNTER — OFFICE VISIT (OUTPATIENT)
Dept: PAIN MEDICINE | Facility: CLINIC | Age: 89
End: 2024-07-15
Payer: MEDICARE

## 2024-07-15 ENCOUNTER — APPOINTMENT (OUTPATIENT)
Dept: PRIMARY CARE | Facility: CLINIC | Age: 89
End: 2024-07-15
Payer: MEDICARE

## 2024-07-15 VITALS
RESPIRATION RATE: 16 BRPM | HEART RATE: 77 BPM | DIASTOLIC BLOOD PRESSURE: 80 MMHG | OXYGEN SATURATION: 98 % | HEIGHT: 64 IN | SYSTOLIC BLOOD PRESSURE: 150 MMHG | WEIGHT: 151 LBS | BODY MASS INDEX: 25.78 KG/M2

## 2024-07-15 DIAGNOSIS — M51.36 DDD (DEGENERATIVE DISC DISEASE), LUMBAR: ICD-10-CM

## 2024-07-15 DIAGNOSIS — M54.16 LUMBAR RADICULOPATHY: ICD-10-CM

## 2024-07-15 DIAGNOSIS — M48.062 SPINAL STENOSIS, LUMBAR REGION WITH NEUROGENIC CLAUDICATION: Primary | ICD-10-CM

## 2024-07-15 DIAGNOSIS — G89.29 OTHER CHRONIC PAIN: ICD-10-CM

## 2024-07-15 PROCEDURE — 3077F SYST BP >= 140 MM HG: CPT | Performed by: ANESTHESIOLOGY

## 2024-07-15 PROCEDURE — 99214 OFFICE O/P EST MOD 30 MIN: CPT | Performed by: ANESTHESIOLOGY

## 2024-07-15 PROCEDURE — 1160F RVW MEDS BY RX/DR IN RCRD: CPT | Performed by: ANESTHESIOLOGY

## 2024-07-15 PROCEDURE — 3079F DIAST BP 80-89 MM HG: CPT | Performed by: ANESTHESIOLOGY

## 2024-07-15 PROCEDURE — 1036F TOBACCO NON-USER: CPT | Performed by: ANESTHESIOLOGY

## 2024-07-15 PROCEDURE — 1159F MED LIST DOCD IN RCRD: CPT | Performed by: ANESTHESIOLOGY

## 2024-07-15 PROCEDURE — 1125F AMNT PAIN NOTED PAIN PRSNT: CPT | Performed by: ANESTHESIOLOGY

## 2024-07-15 RX ORDER — BACLOFEN 10 MG/1
10 TABLET ORAL DAILY PRN
Qty: 30 TABLET | Refills: 1 | Status: SHIPPED | OUTPATIENT
Start: 2024-07-15 | End: 2024-08-14

## 2024-07-15 RX ORDER — SODIUM CHLORIDE, SODIUM LACTATE, POTASSIUM CHLORIDE, CALCIUM CHLORIDE 600; 310; 30; 20 MG/100ML; MG/100ML; MG/100ML; MG/100ML
20 INJECTION, SOLUTION INTRAVENOUS CONTINUOUS
OUTPATIENT
Start: 2024-07-15

## 2024-07-15 ASSESSMENT — ENCOUNTER SYMPTOMS
RESPIRATORY NEGATIVE: 1
ARTHRALGIAS: 1
MYALGIAS: 1
ALLERGIC/IMMUNOLOGIC NEGATIVE: 1
WEAKNESS: 1
HEMATOLOGIC/LYMPHATIC NEGATIVE: 1
BACK PAIN: 1
EYES NEGATIVE: 1
PSYCHIATRIC NEGATIVE: 1
CARDIOVASCULAR NEGATIVE: 1
ENDOCRINE NEGATIVE: 1
CONSTITUTIONAL NEGATIVE: 1
GASTROINTESTINAL NEGATIVE: 1

## 2024-07-15 ASSESSMENT — PAIN - FUNCTIONAL ASSESSMENT: PAIN_FUNCTIONAL_ASSESSMENT: 0-10

## 2024-07-15 ASSESSMENT — PAIN SCALES - GENERAL
PAINLEVEL: 6
PAINLEVEL_OUTOF10: 6

## 2024-07-15 ASSESSMENT — PAIN DESCRIPTION - DESCRIPTORS: DESCRIPTORS: ACHING

## 2024-07-15 NOTE — PROGRESS NOTES
Subjective   Patient ID: Estrella Villafana is a 88 y.o. female who presents for Back Pain.  Back Pain  Associated symptoms include weakness.   Patient here today for follow up and management of her back pain.  She states that the hot weather has been bothering her recently.  The Tramadol has been helpful for her and she is able to sleep and she uses the advil during the day.  She has a lot of stiffness and ache in her back when she gets up in the morning.  She would like to have another epidural steroid injection completed to help with her back and leg pain.  The last 1 did sustain over 50% relief for 12 weeks.  She has been trying to work on some back exercises and stay in her house because it has been very hot.  However morning times are becoming more more challenging for her as she does live independently.  She would like to target the lowest level in her back at L5 and S1 for the next injection.    He does find taking the tramadol in the evening time to be very helpful.  She also has been taking the baclofen throughout the day as well as her Advil.  She states that she is just hanging on at this point and rates her pain as a 7 out of 10 with extended ambulation.  She does use a cane for stability.  She reports no adverse side effects to the tramadol at this time.  She reports no new numbness or weakness in the lower extremities.    Review of Systems   Constitutional: Negative.    HENT: Negative.     Eyes: Negative.    Respiratory: Negative.     Cardiovascular: Negative.    Gastrointestinal: Negative.    Endocrine: Negative.    Genitourinary: Negative.    Musculoskeletal:  Positive for arthralgias, back pain and myalgias.   Skin: Negative.    Allergic/Immunologic: Negative.    Neurological:  Positive for weakness.   Hematological: Negative.    Psychiatric/Behavioral: Negative.         Objective   Physical Exam  Vitals and nursing note reviewed.   Constitutional:       Appearance: Normal appearance.   HENT:       Head: Normocephalic and atraumatic.      Right Ear: Ear canal and external ear normal.      Left Ear: Ear canal and external ear normal.      Nose: Nose normal.      Mouth/Throat:      Mouth: Mucous membranes are moist.      Pharynx: Oropharynx is clear.   Eyes:      Conjunctiva/sclera: Conjunctivae normal.      Pupils: Pupils are equal, round, and reactive to light.   Cardiovascular:      Rate and Rhythm: Normal rate.   Pulmonary:      Effort: Pulmonary effort is normal. No respiratory distress.   Musculoskeletal:      Cervical back: Normal range of motion and neck supple.      Lumbar back: Deformity (kyphotic) and tenderness present. Decreased range of motion. Negative left straight leg raise test. No scoliosis.      Right hip: Crepitus present. Decreased range of motion.   Skin:     General: Skin is warm and dry.   Neurological:      Mental Status: She is alert.      Motor: Motor function is intact.      Coordination: Coordination is intact.      Gait: Gait abnormal (lumbar flexed).   Psychiatric:         Mood and Affect: Mood normal.         Thought Content: Thought content normal.         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    Radiology: All available imaging was reviewed today.  The patient does suffer with moderate central canal stenosis at L5-S1.    Physically: Continue with home exercises.    Psychologically: There are no mental health issues associated with her chronic pain, substance abuse or alcohol abuse issues noted.    Medication: Patient had recent tramadol refill.  Baclofen refilled today  A opioid agreement was presented to the patient today.  The patient had the opportunity to read through the agreement during the office visit and has signed the agreement today.  A copy of the agreement was given to the patient to take home for their records.    A urine or saliva specimen was obtained for toxicology screening      Duration: Greater than 1 year.    Intervention: We will move forward with an L5-S1 intralaminar epidural steroid injection under fluoroscopic guidance.  Risks, benefit, and alternatives of the procedure were discussed with the patient.  Oswestry score has been compelted and recorded.      Star Ann MD 07/15/24 10:38 AM

## 2024-07-15 NOTE — H&P (VIEW-ONLY)
Subjective   Patient ID: Estrella Villafana is a 88 y.o. female who presents for Back Pain.  Back Pain  Associated symptoms include weakness.   Patient here today for follow up and management of her back pain.  She states that the hot weather has been bothering her recently.  The Tramadol has been helpful for her and she is able to sleep and she uses the advil during the day.  She has a lot of stiffness and ache in her back when she gets up in the morning.  She would like to have another epidural steroid injection completed to help with her back and leg pain.  The last 1 did sustain over 50% relief for 12 weeks.  She has been trying to work on some back exercises and stay in her house because it has been very hot.  However morning times are becoming more more challenging for her as she does live independently.  She would like to target the lowest level in her back at L5 and S1 for the next injection.    He does find taking the tramadol in the evening time to be very helpful.  She also has been taking the baclofen throughout the day as well as her Advil.  She states that she is just hanging on at this point and rates her pain as a 7 out of 10 with extended ambulation.  She does use a cane for stability.  She reports no adverse side effects to the tramadol at this time.  She reports no new numbness or weakness in the lower extremities.    Review of Systems   Constitutional: Negative.    HENT: Negative.     Eyes: Negative.    Respiratory: Negative.     Cardiovascular: Negative.    Gastrointestinal: Negative.    Endocrine: Negative.    Genitourinary: Negative.    Musculoskeletal:  Positive for arthralgias, back pain and myalgias.   Skin: Negative.    Allergic/Immunologic: Negative.    Neurological:  Positive for weakness.   Hematological: Negative.    Psychiatric/Behavioral: Negative.         Objective   Physical Exam  Vitals and nursing note reviewed.   Constitutional:       Appearance: Normal appearance.   HENT:       Head: Normocephalic and atraumatic.      Right Ear: Ear canal and external ear normal.      Left Ear: Ear canal and external ear normal.      Nose: Nose normal.      Mouth/Throat:      Mouth: Mucous membranes are moist.      Pharynx: Oropharynx is clear.   Eyes:      Conjunctiva/sclera: Conjunctivae normal.      Pupils: Pupils are equal, round, and reactive to light.   Cardiovascular:      Rate and Rhythm: Normal rate.   Pulmonary:      Effort: Pulmonary effort is normal. No respiratory distress.   Musculoskeletal:      Cervical back: Normal range of motion and neck supple.      Lumbar back: Deformity (kyphotic) and tenderness present. Decreased range of motion. Negative left straight leg raise test. No scoliosis.      Right hip: Crepitus present. Decreased range of motion.   Skin:     General: Skin is warm and dry.   Neurological:      Mental Status: She is alert.      Motor: Motor function is intact.      Coordination: Coordination is intact.      Gait: Gait abnormal (lumbar flexed).   Psychiatric:         Mood and Affect: Mood normal.         Thought Content: Thought content normal.         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    Radiology: All available imaging was reviewed today.  The patient does suffer with moderate central canal stenosis at L5-S1.    Physically: Continue with home exercises.    Psychologically: There are no mental health issues associated with her chronic pain, substance abuse or alcohol abuse issues noted.    Medication: Patient had recent tramadol refill.  Baclofen refilled today  A opioid agreement was presented to the patient today.  The patient had the opportunity to read through the agreement during the office visit and has signed the agreement today.  A copy of the agreement was given to the patient to take home for their records.    A urine or saliva specimen was obtained for toxicology screening      Duration: Greater than 1 year.    Intervention: We will move forward with an L5-S1 intralaminar epidural steroid injection under fluoroscopic guidance.  Risks, benefit, and alternatives of the procedure were discussed with the patient.  Oswestry score has been compelted and recorded.      Star Ann MD 07/15/24 10:38 AM

## 2024-07-22 ENCOUNTER — OFFICE VISIT (OUTPATIENT)
Dept: PRIMARY CARE | Facility: CLINIC | Age: 89
End: 2024-07-22
Payer: MEDICARE

## 2024-07-22 VITALS
BODY MASS INDEX: 26.46 KG/M2 | OXYGEN SATURATION: 98 % | TEMPERATURE: 98.1 F | HEART RATE: 81 BPM | HEIGHT: 64 IN | SYSTOLIC BLOOD PRESSURE: 124 MMHG | WEIGHT: 155 LBS | DIASTOLIC BLOOD PRESSURE: 64 MMHG

## 2024-07-22 DIAGNOSIS — K90.0 ADULT CELIAC DISEASE (HHS-HCC): ICD-10-CM

## 2024-07-22 DIAGNOSIS — F41.9 ANXIETY: ICD-10-CM

## 2024-07-22 DIAGNOSIS — D64.9 CHRONIC ANEMIA: ICD-10-CM

## 2024-07-22 DIAGNOSIS — L89.152 PRESSURE INJURY OF SACRAL REGION, STAGE 2 (MULTI): ICD-10-CM

## 2024-07-22 DIAGNOSIS — E11.9 TYPE 2 DIABETES MELLITUS WITHOUT COMPLICATION, WITHOUT LONG-TERM CURRENT USE OF INSULIN (MULTI): ICD-10-CM

## 2024-07-22 DIAGNOSIS — Z00.00 ROUTINE ADULT HEALTH MAINTENANCE: Primary | ICD-10-CM

## 2024-07-22 DIAGNOSIS — Z13.820 SCREENING FOR OSTEOPOROSIS: ICD-10-CM

## 2024-07-22 DIAGNOSIS — Z78.0 POSTMENOPAUSAL STATE: ICD-10-CM

## 2024-07-22 PROBLEM — M35.9 COLLAGEN DISEASE (MULTI): Status: RESOLVED | Noted: 2021-04-29 | Resolved: 2024-07-22

## 2024-07-22 PROBLEM — M46.1 SACROILIITIS, NOT ELSEWHERE CLASSIFIED (CMS-HCC): Status: RESOLVED | Noted: 2023-11-02 | Resolved: 2024-07-22

## 2024-07-22 PROBLEM — M00.9: Status: RESOLVED | Noted: 2024-02-08 | Resolved: 2024-07-22

## 2024-07-22 PROBLEM — L98.499 NON-PRESSURE CHRONIC ULCER OF SKIN OF OTHER SITES WITH UNSPECIFIED SEVERITY (MULTI): Status: ACTIVE | Noted: 2023-09-15

## 2024-07-22 PROBLEM — L89.153: Status: RESOLVED | Noted: 2023-09-15 | Resolved: 2024-07-22

## 2024-07-22 LAB — SCAN RESULT: NORMAL

## 2024-07-22 PROCEDURE — 3074F SYST BP LT 130 MM HG: CPT | Performed by: FAMILY MEDICINE

## 2024-07-22 PROCEDURE — 1158F ADVNC CARE PLAN TLK DOCD: CPT | Performed by: FAMILY MEDICINE

## 2024-07-22 PROCEDURE — 99215 OFFICE O/P EST HI 40 MIN: CPT | Performed by: FAMILY MEDICINE

## 2024-07-22 PROCEDURE — 1126F AMNT PAIN NOTED NONE PRSNT: CPT | Performed by: FAMILY MEDICINE

## 2024-07-22 PROCEDURE — 1159F MED LIST DOCD IN RCRD: CPT | Performed by: FAMILY MEDICINE

## 2024-07-22 PROCEDURE — 3078F DIAST BP <80 MM HG: CPT | Performed by: FAMILY MEDICINE

## 2024-07-22 PROCEDURE — 1123F ACP DISCUSS/DSCN MKR DOCD: CPT | Performed by: FAMILY MEDICINE

## 2024-07-22 PROCEDURE — 1160F RVW MEDS BY RX/DR IN RCRD: CPT | Performed by: FAMILY MEDICINE

## 2024-07-22 PROCEDURE — G0439 PPPS, SUBSEQ VISIT: HCPCS | Performed by: FAMILY MEDICINE

## 2024-07-22 ASSESSMENT — PAIN SCALES - GENERAL: PAINLEVEL: 0-NO PAIN

## 2024-07-22 NOTE — ASSESSMENT & PLAN NOTE
>>ASSESSMENT AND PLAN FOR PRESSURE INJURY OF SACRAL REGION, STAGE 2 (MULTI) WRITTEN ON 7/22/2024  8:21 AM BY JAM UGARTE DO    - Stable

## 2024-07-22 NOTE — PATIENT INSTRUCTIONS
Problem List Items Addressed This Visit             ICD-10-CM    Anxiety F41.9     - Stable on current regimen   -We will continue without modification         Type 2 diabetes mellitus without complication, without long-term current use of insulin (Multi) E11.9     - A1c showed good control at 5.6% in April  -Continue to focus on healthy, low-fat diet with moderation of carbohydrates  -Annual diabetic eye exam advocated         Relevant Orders    TSH with reflex to Free T4 if abnormal    Lipid Panel    Comprehensive Metabolic Panel    Hemoglobin A1c    Adult celiac disease ([facility]) K90.0     - Will continue with dietary modification to avoid trigger foods and GI follow-up as needed         Chronic anemia D64.9    Relevant Orders    CBC     Other Visit Diagnoses         Codes    Routine adult health maintenance    -  Primary Z00.00    Relevant Orders    XR DEXA bone density    Pressure injury of sacral region, 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  - Blood work may include a cholesterol and diabetes screen if risk factors exist (overweight, high blood pressure etc); screening for sexually transmitted infections; a one time HIV screen for all individuals, and a one time Hepatitis C Virus screen for those born between [birth year]19-6214.  - I encourage you to eat a low-fat, moderate-carbohydrate, low-calorie diet to maintain a normal BMI (under 25) to reduce heart disease, and risk for diabetes  - Moderate intensity exercise for 30 minutes 5 days per week is recommended  - Along with recommendations for nutrition and exercise discussed today helpful resources recommended by the American Academy of Family Practice can be found at www.familydoctor.org or  www.choosemyplate.gov    - Colon cancer screening for all ages 45-75 or 85 years old periodically depending on results.  - Cervical cancer screening (pap test) in women between 21-65 years old, periodically depending on results.  - Mammogram screening for breast cancer in women starting at 40-50 years and every 1-2 years.  - For men and women who have a 30 pack year smoking history and currently smoke or have quit in the past 15 years, screening for lung cancer with a yearly low dose CT scan is recommended starting at age 55 until age 80 years  - For men only who have smoked 100+ cigarettes anytime in their lifetime, a one time ultrasound screening for for abdominal aortic aneurysms starting at age 65 until 75 years old  - Bone density screening (DEXA) for osteoporosis in women aged 65 years and older, once every two years if needed.    IMMUNIZATIONS:  - Flu shot annually.  - Tetanus booster every 10 years.  - Two pneumococcal vaccinations after 65 years old.  - Shingles vaccine for those 50 years or older.     This was a shared decision making visit.    Next Wellness Exam Due  In 1 year from today    Counseling:       Medication education:         Education:  The patient is counseled regarding potential side-effects of all new medications        Understanding:  Patient expressed understanding        Adherence:  No barriers to adherence identified    ** Please excuse any errors in grammar or translation related to this dictation. Voice recognition software was utilized to prepare this document. **

## 2024-07-22 NOTE — ASSESSMENT & PLAN NOTE
- A1c showed good control at 5.6% in April  -Continue to focus on healthy, low-fat diet with moderation of carbohydrates  -Annual diabetic eye exam advocated

## 2024-07-22 NOTE — PROGRESS NOTES
Outpatient Visit Note    Chief Complaint   Patient presents with    Annual Exam       HPI:  Estrella Villafana is a 88 y.o. female with a past medical history significant for controlled diabetes, hypertension, hyperlipidemia, anxiety/depression, IBS, vitamin-D deficiency and cervical/lumbar degenerative disc disease currently followed by Dr. Ann of pain management and sacral wound followed wound care who presents to the office for an annual Medicare Wellness Visit.  She was last seen in April for routine follow-up           She had previously been established with Dr. Wells for primary care, with prior established relationship with Dr. Reyes whom she last saw in 2018.           Last panel blood work completed on 4/25/2024 including CBC, CMP, lipid panel and urine albumin.  At that time, blood counts were generally stable to patient's baseline with no overt anemia.  Fasting blood glucose levels were elevated consistent with well-controlled diabetes.  Kidney function was stable.           Well Exam:  Overall, they describes their health as good with no reports of recent illness or hospitalization. They state that their diet is stable. In regards to physical activity, she attempts to stay active though activity is limited by chronic pain. They deny any significant sleep complaints. No reported issues of chest pain, shortness of breath, headaches, vision/hearing changes, abdominal pain, vomiting, diarrhea, melena, hematochezia, constipation or urinary symptoms.    Preventative Health Maintenance:  In regards to preventative health maintenance, last Tdap received in 2017. Flu shot received for past season. Pneumonia vaccination series previously initiated with patient up-to-date with Prevnar 20. Shingles vaccination series completed. COVID-19 vaccination series completed with patient currently due for most recent booster. No history of abnormals. DEXA scan in June 2022.    Diabetes:  As noted good sugar control on  glipizide 5 mg extended release daily.  Was previously on metformin which was discontinued secondary to prominent GI issues.  A1c sugar average showed great control at 5.6% in April. No reported polyuria, polydipsia, polyphagia or acute vision/sensation changes.           Chronic pain/fullness degenerative disc disease/polyarthralgias:  Continues to follow with with Dr. Ann of pain management. Last visit was on 7/15/2024. Has undergone injection therapy for lumbar degenerative disc disease.  Has additionally followed through with consultation with Dr. Hall of Rheumatology, with main focus being on right hip. Therapeutic injection scheduled with physical therapy encouraged.  Has done well with as needed tramadol which has been closely managed/monitored by pain management.    Sacral wound:  In review, the patient presented to the [facility] emergency department on 01/08/2023 secondary to complaints of abscess of rectum. She was ultimately diagnosed with cellulitis with perception that abscess had successfully drained. She was started on regimen of clindamycin 300 mg 3 times a day and discharged home with wound care instructions and recommendations for outpatient follow-up with PCP. At time initial appointment she reported compliance with clindamycin noting improvement with redness/swelling. Had assistance from family with changing dressing daily. Patient continued to have prominently open wound with significant granulation tissue with overt drainage reported. [facility]. Patient had successful follow-through with wound care center with resolved sacral ulcer.  Continued monitoring and supportive care was advocated along with plans to focus on mobility with chronic right hip pain.  Did have hip injection through rheumatology which provided no symptom relief.  Continues to focus on repositioning with no active irritation of her sacrum to which she did have more recent recurrent  sacral ulceration.  Wound care services were reinitiated.  Has had resolution of symptoms.  Has kept a sense of how area is doing with no active concerns/complaints           Anxiety/IBS/chronic diarrhea:  History of IBS like symptoms for the past several years, to which symptoms did improve upon removal of metformin.  Continues to utilize probiotics and as needed Imodium.    In regards to anxiety, has continued with BuSpar 15 mg twice daily.  States that regimen has continued to be effective with no reports of adverse side effects.  Does continue to struggle with insomnia but this has been kept in check with as needed nightly tramadol.  States that the medication has been helping significantly with improvement in her overall stress level           Of note at last encounter, patient continued to have prominent hand nodule which had improved in regards to redness/warmth following antibiotic course.  Orthopedic hand consultation was ultimately given.  And x-rays were completed and interval optimize patient has follow-up in the near future with hand specialist.  Symptoms have gradually improved the nodule persists, perceived to be arthritic in nature.  Continues to show progressive improvement with no active complaints today    Advanced directives: Living will/power of     Hearing screen: reports no difficulty with hearing and passes finger rub test bilaterally    Does the patient use opioid medications: Yes  Name of medication: Tramadol  If yes, do they take this medicine appropriately: yes    How does the patient rate their health status today: Good    Cognitive Screen:  AAAx3  to person, place and time: Yes  3 word recall: Apple, Car, Shoe - Immediate recall: Yes        - 5 minutes recall: Yes  Impression: No cognitive deficiency observed during screening or encounter today      Reviewed:   Past Medical History/Allergies:  Yes  Family History:  Yes  Social History:  Yes  Current Medications:  Yes  Vital  Signs:  Yes  Advanced Directives:  discussed  Immunizations:  reviewed today  Home Safety:                    Up & Go test > 30 seconds?  No                   Home have rugs; lack grab bars in bathroom; lack handrail on stairs; have poor lighting?  No                   Hearing difficulties?  No  Geriatric Assessment                   ADL areas requiring assistance:  Does not need help with Dressing, Eating, Ambulating, Toileting, Grooming, Hygiene.                    IADL areas requiring assistance:  Does not need help with Shopping, Housework, Accounting, Transportation, Driving.   Medications: reviewed  Current supplements:  Reviewed and recorded.   Other providers: Reviewed and recorded - Current providers and suppliers: Dr. Quiroz; Dr. Ann -pain management; Dr. Hall -rheumatology      Past Medical History:   Diagnosis Date    Anxiety     Cellulitis of finger of left hand 09/15/2023    Hyperglycemia     Hyperlipidemia     Hypertension     Lactose intolerance     Osteoarthritis     Sciatica     Spinal stenosis         Current Medications  Current Outpatient Medications   Medication Instructions    acetaminophen (TYLENOL) 325 mg, oral, Nightly    amLODIPine (NORVASC) 5 mg, oral, Daily    baclofen (LIORESAL) 10 mg, oral, Daily PRN    biotin 5 mg, oral, Daily    busPIRone (BUSPAR) 15 mg, oral, 2 times daily    calcium carbonate (CALTRATE 600 ORAL) 600 mg, oral, Daily    cholecalciferol (VITAMIN D3) 50 mcg, oral, Daily    ezetimibe (ZETIA) 10 mg, oral, Daily    fluocinonide (Lidex) 0.05 % external solution 1 Application, Topical, As needed    fluticasone (Flonase) 50 mcg/actuation nasal spray 1 spray, Each Nostril, Daily, Shake gently. Before first use, prime pump. After use, clean tip and replace cap.    folic acid/multivit-min/lutein (CENTRUM SILVER ORAL) 1 tablet, oral, Daily    glipiZIDE XL (GLUCOTROL XL) 5 mg, oral, Daily, Take with food.    ibuprofen 600 mg, oral, 2 times daily    loperamide (Imodium  "A-D) 2 mg tablet 1 tablet, oral, As needed    losartan (COZAAR) 50 mg, oral, Daily    traMADol (ULTRAM) 50 mg, oral, 2 times daily    vitamins A,C,E-zinc-copper (PreserVision AREDS) 4,296 mcg-226 mg-90 mg capsule 1 capsule, oral, 2 times daily        Allergies  Allergies   Allergen Reactions    Atorvastatin Other and Myalgia     Joint pain    Lactose GI Upset    Dexamethasone Myalgia    Lisinopril Myalgia and Unknown    Triamterene-Hydrochlorothiazid Myalgia    Cephalexin Diarrhea        Immunizations  Immunization History   Administered Date(s) Administered    Flu vaccine, quadrivalent, high-dose, preservative free, age 65y+ (FLUZONE) 10/22/2020, 11/04/2021, 11/17/2022, 10/17/2023    Influenza, High Dose Seasonal, Preservative Free 10/23/2017, 09/12/2018, 10/16/2019    Influenza, Unspecified 11/17/2022    Influenza, seasonal, injectable 10/18/2016    Pfizer COVID-19 vaccine, bivalent, age 12 years and older (30 mcg/0.3 mL) 11/17/2022    Pfizer Purple Cap SARS-CoV-2 02/07/2021, 03/08/2021, 12/09/2021    Pneumococcal conjugate vaccine, 13-valent (PREVNAR 13) 10/28/2015    Pneumococcal conjugate vaccine, 20-valent (PREVNAR 20) 08/22/2023    Pneumococcal polysaccharide vaccine, 23-valent, age 2 years and older (PNEUMOVAX 23) 08/21/2012, 01/11/2019    Tdap vaccine, age 7 year and older (BOOSTRIX, ADACEL) 11/14/2017    Zoster vaccine, recombinant, adult (SHINGRIX) 03/02/2020, 08/10/2020    Zoster, live 08/21/2012        Past Surgical History:   Procedure Laterality Date    ADENOIDECTOMY      CHOLECYSTECTOMY  08/28/2014    ROBOTIC SINGLE PORT LAPAROSCOPIC    OTHER SURGICAL HISTORY      Spinal implant    OTHER SURGICAL HISTORY      MOLAR EXTRACTION    SPINE SURGERY  08/23/2022    \"TUBE IN SPINE\"    TONSILLECTOMY       Family History   Problem Relation Name Age of Onset    Hypertension Mother      Stroke Mother      Stroke Father      Retinal detachment Other females      Social History     Tobacco Use    Smoking status: " Never    Smokeless tobacco: Never   Vaping Use    Vaping status: Never Used   Substance Use Topics    Alcohol use: Never    Drug use: Never     Tobacco Use: Low Risk  (7/22/2024)    Patient History     Smoking Tobacco Use: Never     Smokeless Tobacco Use: Never     Passive Exposure: Not on file        ROS  All pertinent positive symptoms are included in the history of present illness.  All other systems have been reviewed and are negative and noncontributory to this patient's current ailments.    VITAL SIGNS  Vitals:    07/22/24 1101   BP: 124/64   Pulse: 81   Temp: 36.7 °C (98.1 °F)   SpO2: 98%     Vitals:    07/22/24 1101   Weight: 70.3 kg (155 lb)      Body mass index is 26.61 kg/m².     PHYSICAL EXAM  GENERAL APPEARANCE: well nourished, well developed, looks like stated age, in no acute distress, not ill or tired appearing, conversing well.   HEENT: no trauma, normocephalic. PERRLA and EOMI with normal external exam. TM's intact with no injection or effusion, no signs of infection. Nares patent, turbinates pink without discharge. Pharynx pink with no exudates or lesions, no enlarged tonsils.   NECK: no nodes, supple without rigidity, no neck mass was observed, no thyromegaly or thyroid nodules.   HEART: regular rate and rhythm, S1 and S2 heard with no murmurs or skipped beats  LUNGS: clear to auscultation bilaterally with no wheezes, crackles or rales.   ABDOMEN: no organomegaly, soft, nontender, nondistended, no guarding/rebound/rigidity.   EXTREMITIES: moving all extremities equally with no edema or deformities.   SKIN: normal skin color and pigmentation, normal skin turgor without rash, lesions, or nodules visualized.   NEUROLOGIC EXAM: CN II-XII grossly intact, normal gait, normal balance, 5/5 muscle strength, sensation grossly intact.   PSYCH: mood and affect appropriate; alert and oriented to time, place, person; no difficulty with speech or language.       Preventative Services reviewed with patient and  copy printed for patient.    Assessment/Plan   Problem List Items Addressed This Visit             ICD-10-CM    Anxiety F41.9     - Stable on current regimen   -We will continue without modification         Type 2 diabetes mellitus without complication, without long-term current use of insulin (Multi) E11.9     - A1c showed good control at 5.6% in April  -Continue to focus on healthy, low-fat diet with moderation of carbohydrates  -Annual diabetic eye exam advocated         Relevant Orders    TSH with reflex to Free T4 if abnormal    Lipid Panel    Comprehensive Metabolic Panel    Hemoglobin A1c    Adult celiac disease ([facility]) K90.0     - Will continue with dietary modification to avoid trigger foods and GI follow-up as needed         Chronic anemia D64.9    Relevant Orders    CBC     Other Visit Diagnoses         Codes    Routine adult health maintenance    -  Primary Z00.00    Relevant Orders    XR DEXA bone density    Pressure injury of sacral region, 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  - Blood work may include a cholesterol and diabetes screen if risk factors exist (overweight, high blood pressure etc); screening for sexually transmitted infections; a one time HIV screen for all individuals, and a one time Hepatitis C Virus screen for those born between 7334-6090.  - I encourage you to eat a low-fat, moderate-carbohydrate, low-calorie diet to maintain a normal BMI (under 25) to reduce heart disease, and risk for diabetes  - Moderate intensity exercise for 30 minutes 5 days per week is recommended  - Along with recommendations for nutrition and exercise discussed today helpful resources recommended by the American Academy of Family Practice can be  found at www.familydoctor.org or www.choosemyplate.gov    - Colon cancer screening for all ages 45-75 or 85 years old periodically depending on results.  - Cervical cancer screening (pap test) in women between 21-65 years old, periodically depending on results.  - Mammogram screening for breast cancer in women starting at 40-50 years and every 1-2 years.  - For men and women who have a 30 pack year smoking history and currently smoke or have quit in the past 15 years, screening for lung cancer with a yearly low dose CT scan is recommended starting at age 55 until age 80 years  - For men only who have smoked 100+ cigarettes anytime in their lifetime, a one time ultrasound screening for for abdominal aortic aneurysms starting at age 65 until 75 years old  - Bone density screening (DEXA) for osteoporosis in women aged 65 years and older, once every two years if needed.    IMMUNIZATIONS:  - Flu shot annually.  - Tetanus booster every 10 years.  - Two pneumococcal vaccinations after 65 years old.  - Shingles vaccine for those 50 years or older.     This was a shared decision making visit.    Next Wellness Exam Due  In 1 year from today    Counseling:       Medication education:         Education:  The patient is counseled regarding potential side-effects of all new medications        Understanding:  Patient expressed understanding        Adherence:  No barriers to adherence identified    ** Please excuse any errors in grammar or translation related to this dictation. Voice recognition software was utilized to prepare this document. **

## 2024-08-02 ENCOUNTER — HOSPITAL ENCOUNTER (OUTPATIENT)
Dept: OPERATING ROOM | Facility: HOSPITAL | Age: 89
Discharge: HOME | End: 2024-08-02
Payer: MEDICARE

## 2024-08-02 VITALS
HEIGHT: 64 IN | BODY MASS INDEX: 26.98 KG/M2 | SYSTOLIC BLOOD PRESSURE: 161 MMHG | TEMPERATURE: 97.2 F | HEART RATE: 73 BPM | OXYGEN SATURATION: 97 % | RESPIRATION RATE: 18 BRPM | DIASTOLIC BLOOD PRESSURE: 76 MMHG | WEIGHT: 158 LBS

## 2024-08-02 DIAGNOSIS — M51.36 DDD (DEGENERATIVE DISC DISEASE), LUMBAR: ICD-10-CM

## 2024-08-02 DIAGNOSIS — M48.062 SPINAL STENOSIS, LUMBAR REGION WITH NEUROGENIC CLAUDICATION: ICD-10-CM

## 2024-08-02 DIAGNOSIS — M54.16 LUMBAR RADICULOPATHY: ICD-10-CM

## 2024-08-02 LAB — GLUCOSE BLD MANUAL STRIP-MCNC: 70 MG/DL (ref 74–99)

## 2024-08-02 PROCEDURE — 7100000009 HC PHASE TWO TIME - INITIAL BASE CHARGE

## 2024-08-02 PROCEDURE — 7100000010 HC PHASE TWO TIME - EACH INCREMENTAL 1 MINUTE

## 2024-08-02 PROCEDURE — 2500000005 HC RX 250 GENERAL PHARMACY W/O HCPCS: Performed by: ANESTHESIOLOGY

## 2024-08-02 PROCEDURE — 3600000006 HC OR TIME - EACH INCREMENTAL 1 MINUTE - PROCEDURE LEVEL ONE

## 2024-08-02 PROCEDURE — 62323 NJX INTERLAMINAR LMBR/SAC: CPT | Performed by: ANESTHESIOLOGY

## 2024-08-02 PROCEDURE — 82947 ASSAY GLUCOSE BLOOD QUANT: CPT

## 2024-08-02 PROCEDURE — 2550000001 HC RX 255 CONTRASTS: Performed by: ANESTHESIOLOGY

## 2024-08-02 PROCEDURE — 2500000004 HC RX 250 GENERAL PHARMACY W/ HCPCS (ALT 636 FOR OP/ED): Performed by: ANESTHESIOLOGY

## 2024-08-02 PROCEDURE — 3600000001 HC OR TIME - INITIAL BASE CHARGE - PROCEDURE LEVEL ONE

## 2024-08-02 RX ORDER — LIDOCAINE HYDROCHLORIDE 20 MG/ML
INJECTION, SOLUTION EPIDURAL; INFILTRATION; INTRACAUDAL; PERINEURAL AS NEEDED
Status: COMPLETED | OUTPATIENT
Start: 2024-08-02 | End: 2024-08-02

## 2024-08-02 RX ORDER — TRIAMCINOLONE ACETONIDE 40 MG/ML
INJECTION, SUSPENSION INTRA-ARTICULAR; INTRAMUSCULAR AS NEEDED
Status: COMPLETED | OUTPATIENT
Start: 2024-08-02 | End: 2024-08-02

## 2024-08-02 RX ORDER — LIDOCAINE HYDROCHLORIDE 5 MG/ML
INJECTION, SOLUTION INFILTRATION; INTRAVENOUS AS NEEDED
Status: COMPLETED | OUTPATIENT
Start: 2024-08-02 | End: 2024-08-02

## 2024-08-02 ASSESSMENT — PAIN SCALES - GENERAL
PAINLEVEL_OUTOF10: 5 - MODERATE PAIN
PAINLEVEL_OUTOF10: 5 - MODERATE PAIN

## 2024-08-02 ASSESSMENT — PAIN - FUNCTIONAL ASSESSMENT
PAIN_FUNCTIONAL_ASSESSMENT: 0-10
PAIN_FUNCTIONAL_ASSESSMENT: 0-10

## 2024-08-02 ASSESSMENT — PAIN DESCRIPTION - DESCRIPTORS: DESCRIPTORS: ACHING

## 2024-08-02 ASSESSMENT — COLUMBIA-SUICIDE SEVERITY RATING SCALE - C-SSRS
6. HAVE YOU EVER DONE ANYTHING, STARTED TO DO ANYTHING, OR PREPARED TO DO ANYTHING TO END YOUR LIFE?: NO
2. HAVE YOU ACTUALLY HAD ANY THOUGHTS OF KILLING YOURSELF?: NO
1. IN THE PAST MONTH, HAVE YOU WISHED YOU WERE DEAD OR WISHED YOU COULD GO TO SLEEP AND NOT WAKE UP?: NO

## 2024-08-02 NOTE — NURSING NOTE
Pt arrived in stable condition, bandaid dry and intact, no neuro deficits, discharge instructions reviewed.

## 2024-08-02 NOTE — DISCHARGE INSTRUCTIONS
DISCHARGE INSTRUCTIONS FOR INJECTIONS     You underwent lumbar epidural steroid injection today    Aftermost injections, it is recommended that you relax and limit your activity for the remainder of the day unless you have been told otherwise by your pain physician.  You should not drive a car, operate machinery, or make important legal decisions unless otherwise directed by your pain physician.  You may resume your normal activity, including exercise, tomorrow.      Keep a written pain diary of how much pain relief you experienced following the injection procedure and the length of time of pain relief you experienced pain relief. Following diagnostic injections like medial branch nerve blocks, sacroiliac joint blocks, stellate ganglion injections and other blocks, it is very important you record the specific amount of pain relief you experienced immediately after the injectionand how long it lasted. Your doctor will ask you for this information at your follow up visit.     For all injections, please keep the injection site dry and inspect the site for a couple of days. You may remove the Band-Aid the day of the injection at any time.     Some discomfort, bruising or slight swelling may occur at the injection site. This is not abnormal if it occurs.  If needed you may:    -Take over the counter medication such as Tylenol or Motrin.   -Apply an ice pack for 30 minutes, 2 to 3 times a day for the first 24 hours.     You may shower today; no soaking baths, hot tubs, whirlpools or swimming pools for two days.      If you are given steroids in your injection, it may take 3-5 days for the steroid medication to take effect. You may notice a worsening of your symptoms for 1-2 days after the injection. This is not abnormal.  You may use acetaminophen, ibuprofen, or prescription medication that your doctor may have prescribed for you if you need to do so.     A few common side effects of steroids include facial flushing,  sweating, restlessness, irritability,difficulty sleeping, increase in blood sugar, and increased blood pressure. If you have diabetes, please monitor your blood sugar at least once a day for at least 5 days. If you have poorly controlled high blood pressure, monitoryour blood pressure for at least 2 days and contact your primary care physician if these numbers are unusually high for you.      If you take aspirin or non-steroidal anti-inflammatory drugs (examples are Motrin, Advil, ibuprofen, Naprosyn, Voltaren, Relafen, etc.) you may restart these this evening, but stop taking it 3 days before your next appointment, unless instructed otherwiseby your physician.      You do not need to discontinue non-aspirin-containing pain medications prior to an injection (examples: Celebrex, tramadol, hydrocodone and acetaminophen).      If you take a blood thinning medication (Coumadin, Lovenox, Fragmin,Ticlid, Plavix, Pradaxa, etc.), please discuss this with your primary care physician/cardiologist and your pain physician. These medications MUST be discontinued before you can have an injection safely, without the risk of uncontrolled bleeding. If these medications are not discontinued for an appropriate period of time, you will not be able to receivean injection.      If you are taking Coumadin, please have your INR checked the morning of your procedure and bringthe result to your appointment unless otherwise instructed. If your INR is over 1.2, your injection may need to be rescheduled to avoid uncontrolled bleeding from the needle placement.     Call [facility] at [phone] between 8am-4pm Monday - Friday if you are experiencing the following:    If you received an epidural or spinal injection:    -Headache that doesnot go away with medicine, is worse when sitting or standing up, and is greatly relieved upon lying down.   -Severe pain worse than or different than your baseline pain.   -Chills or fever  (101º F or greater).   -Drainage or signs of infection at the injection site     Go directly to the Emergency Department if you are experiencing the following and received an epidural or spinal injection:   -Abrupt weakness or progressive weakness in your legs that starts after you leave the clinic.   -Abrupt severe or worsening numbness in your legs.   -Inability to urinate after the injection or loss of bowel or bladder control without the urge to defecate or urinate.     If you have a clinical question that cannot wait until your next appointment, please call [phone] between 8am-4pm Monday - Friday or send a [employer] message. We do our best to return all non-emergency messages within 24 hours, Monday - Friday. A nurse or physician will return your message.      If you need to cancel an appointment, please call the scheduling staff at [phone] during normal business hours or leave a message at least 24 hours in advance.     If you are going to be sedated for your next procedure, you MUST have responsible adult who can legally drive accompany you home. You cannot eat or drink for eight hours prior to the planned procedure if you are going to receive sedation. You may take your non-blood thinning medications with a small sip of water.

## 2024-08-08 ENCOUNTER — HOSPITAL ENCOUNTER (OUTPATIENT)
Dept: RADIOLOGY | Facility: HOSPITAL | Age: 89
Discharge: HOME | End: 2024-08-08
Payer: MEDICARE

## 2024-08-08 DIAGNOSIS — Z13.820 SCREENING FOR OSTEOPOROSIS: ICD-10-CM

## 2024-08-08 DIAGNOSIS — Z78.0 POSTMENOPAUSAL STATE: ICD-10-CM

## 2024-08-08 DIAGNOSIS — Z00.00 ROUTINE ADULT HEALTH MAINTENANCE: ICD-10-CM

## 2024-08-08 PROCEDURE — 77080 DXA BONE DENSITY AXIAL: CPT

## 2024-08-08 PROCEDURE — 77080 DXA BONE DENSITY AXIAL: CPT | Performed by: RADIOLOGY

## 2024-08-12 ENCOUNTER — APPOINTMENT (OUTPATIENT)
Dept: PAIN MEDICINE | Facility: CLINIC | Age: 89
End: 2024-08-12
Payer: MEDICARE

## 2024-09-04 ENCOUNTER — OFFICE VISIT (OUTPATIENT)
Dept: PAIN MEDICINE | Facility: CLINIC | Age: 89
End: 2024-09-04
Payer: MEDICARE

## 2024-09-04 VITALS
HEIGHT: 64 IN | BODY MASS INDEX: 26.98 KG/M2 | RESPIRATION RATE: 18 BRPM | WEIGHT: 158 LBS | SYSTOLIC BLOOD PRESSURE: 130 MMHG | OXYGEN SATURATION: 97 % | HEART RATE: 65 BPM | DIASTOLIC BLOOD PRESSURE: 60 MMHG

## 2024-09-04 DIAGNOSIS — M48.061 SPINAL STENOSIS, LUMBAR REGION WITHOUT NEUROGENIC CLAUDICATION: Primary | ICD-10-CM

## 2024-09-04 DIAGNOSIS — M54.16 RADICULOPATHY, LUMBAR REGION: ICD-10-CM

## 2024-09-04 DIAGNOSIS — M51.36 DDD (DEGENERATIVE DISC DISEASE), LUMBAR: ICD-10-CM

## 2024-09-04 PROCEDURE — 1159F MED LIST DOCD IN RCRD: CPT | Performed by: PHYSICIAN ASSISTANT

## 2024-09-04 PROCEDURE — 3075F SYST BP GE 130 - 139MM HG: CPT | Performed by: PHYSICIAN ASSISTANT

## 2024-09-04 PROCEDURE — 99214 OFFICE O/P EST MOD 30 MIN: CPT | Performed by: PHYSICIAN ASSISTANT

## 2024-09-04 PROCEDURE — 3078F DIAST BP <80 MM HG: CPT | Performed by: PHYSICIAN ASSISTANT

## 2024-09-04 PROCEDURE — 1036F TOBACCO NON-USER: CPT | Performed by: PHYSICIAN ASSISTANT

## 2024-09-04 PROCEDURE — 1160F RVW MEDS BY RX/DR IN RCRD: CPT | Performed by: PHYSICIAN ASSISTANT

## 2024-09-04 RX ORDER — TRAMADOL HYDROCHLORIDE 50 MG/1
50 TABLET ORAL EVERY 6 HOURS PRN
Qty: 60 TABLET | Refills: 2 | Status: SHIPPED | OUTPATIENT
Start: 2024-09-04 | End: 2024-12-03

## 2024-09-04 ASSESSMENT — PAIN SCALES - GENERAL
PAINLEVEL: 5
PAINLEVEL_OUTOF10: 5 - MODERATE PAIN

## 2024-09-04 ASSESSMENT — LIFESTYLE VARIABLES
AUDIT-C TOTAL SCORE: 0
SKIP TO QUESTIONS 9-10: 1
HOW OFTEN DO YOU HAVE A DRINK CONTAINING ALCOHOL: NEVER
HOW OFTEN DO YOU HAVE SIX OR MORE DRINKS ON ONE OCCASION: NEVER
HOW MANY STANDARD DRINKS CONTAINING ALCOHOL DO YOU HAVE ON A TYPICAL DAY: PATIENT DOES NOT DRINK

## 2024-09-04 ASSESSMENT — PAIN - FUNCTIONAL ASSESSMENT: PAIN_FUNCTIONAL_ASSESSMENT: 0-10

## 2024-09-04 ASSESSMENT — ENCOUNTER SYMPTOMS
PSYCHIATRIC NEGATIVE: 1
MYALGIAS: 1
EYES NEGATIVE: 1
WEAKNESS: 1
CONSTITUTIONAL NEGATIVE: 1
ALLERGIC/IMMUNOLOGIC NEGATIVE: 1
ENDOCRINE NEGATIVE: 1
RESPIRATORY NEGATIVE: 1
CARDIOVASCULAR NEGATIVE: 1
BACK PAIN: 1
GASTROINTESTINAL NEGATIVE: 1
HEMATOLOGIC/LYMPHATIC NEGATIVE: 1
ARTHRALGIAS: 1

## 2024-09-04 NOTE — PROGRESS NOTES
Subjective   Patient ID: Estrella Villafana is a 88 y.o. female who presents for Pain.  Patient is an 88-year-old female with spinal stenosis neurogenic claudication degenerative disc disease and lumbar radiculopathy. pt had a recent lumbar epidural injection with Dr Ann. Pt report relefi and improvement of symptoms initally but has reduced today about 50%. Pt still reports issues with prolonged standing causing incresaed lumbar pain. She still uses the tramadol HS to help her sleep. She is worried about it use during the day as she fear sedation, which she has not experienced.         Review of Systems   Constitutional: Negative.    HENT: Negative.     Eyes: Negative.    Respiratory: Negative.     Cardiovascular: Negative.    Gastrointestinal: Negative.    Endocrine: Negative.    Genitourinary: Negative.    Musculoskeletal:  Positive for arthralgias, back pain and myalgias.   Skin: Negative.    Allergic/Immunologic: Negative.    Neurological:  Positive for weakness.   Hematological: Negative.    Psychiatric/Behavioral: Negative.         Objective   Physical Exam    Assessment/Plan   Problem List Items Addressed This Visit             ICD-10-CM    DDD (degenerative disc disease), lumbar M51.36    Radiculopathy, lumbar region M54.16    Spinal stenosis, lumbar region without neurogenic claudication - Primary M48.061   I had nice discussion with the patient today our plan will be as follows.      Radiology: [ none at this time ]      Physically:  [ continue modification of activities, healthy lifestyle choice ]      Psychologically:  [ No acute psychological concerns. There are no mental health issues of which I am aware that are contributing to the patient's pain. There are no substance abuse or alcohol abuse issues of which I am aware that are contributing to the patient's pain.  ]      Medication: [I will refill the medications at the same dose and frequency. We will continue to monitor the patient every 3 months  for compliance, adverse reaction or interactions The patient continues to see benefit and improvement in their quality of life and ability to maintain ADLs. Patient educated about the risks of taking opioids and operating a motor vehicle. Patient reports no adverse side effects to current medication regimen. Current regimen does allow patient to maintain ADLs. Oarrs has been reviewed. No suspicion of diversion or abuse. Compliance with medication regime, no use of illicit drugs, no sharing of narcotic medications with others, do not use others narcotic medication, and to avoid alcohol use. Patient has been educated on the risks, benefits, and alternatives of controlled substances as well as the proper way to store these medications.   The patient and I discussed the nature of this medication and its side effects. We discussed tolerance, physical dependence, psychological dependence, addiction and opioid-induced hyperalgesia ]      Duration:  [ 3-6 months ]      Intervention:  [ Pt is still frustrated that her denial of the minute man procedure. Prior level was helpful and it is of my opnion that this is likely to benefit pt symptoms.  ]           Rob Elizondo PA-C 09/04/24 11:09 AM

## 2024-10-23 ENCOUNTER — LAB (OUTPATIENT)
Dept: LAB | Facility: LAB | Age: 89
End: 2024-10-23
Payer: MEDICARE

## 2024-10-23 DIAGNOSIS — E11.9 TYPE 2 DIABETES MELLITUS WITHOUT COMPLICATION, WITHOUT LONG-TERM CURRENT USE OF INSULIN (MULTI): ICD-10-CM

## 2024-10-23 DIAGNOSIS — D64.9 CHRONIC ANEMIA: ICD-10-CM

## 2024-10-23 LAB
ALBUMIN SERPL BCP-MCNC: 3.9 G/DL (ref 3.4–5)
ALP SERPL-CCNC: 48 U/L (ref 33–136)
ALT SERPL W P-5'-P-CCNC: 20 U/L (ref 7–45)
ANION GAP SERPL CALCULATED.3IONS-SCNC: 10 MMOL/L (ref 10–20)
AST SERPL W P-5'-P-CCNC: 21 U/L (ref 9–39)
BILIRUB SERPL-MCNC: 0.5 MG/DL (ref 0–1.2)
BUN SERPL-MCNC: 24 MG/DL (ref 6–23)
CALCIUM SERPL-MCNC: 9.5 MG/DL (ref 8.6–10.3)
CHLORIDE SERPL-SCNC: 107 MMOL/L (ref 98–107)
CHOLEST SERPL-MCNC: 209 MG/DL (ref 0–199)
CHOLEST/HDLC SERPL: 3.2 {RATIO}
CO2 SERPL-SCNC: 29 MMOL/L (ref 21–32)
CREAT SERPL-MCNC: 0.59 MG/DL (ref 0.5–1.05)
EGFRCR SERPLBLD CKD-EPI 2021: 87 ML/MIN/1.73M*2
ERYTHROCYTE [DISTWIDTH] IN BLOOD BY AUTOMATED COUNT: 14.1 % (ref 11.5–14.5)
EST. AVERAGE GLUCOSE BLD GHB EST-MCNC: 114 MG/DL
GLUCOSE SERPL-MCNC: 97 MG/DL (ref 74–99)
HBA1C MFR BLD: 5.6 %
HCT VFR BLD AUTO: 39.2 % (ref 36–46)
HDLC SERPL-MCNC: 65.4 MG/DL
HGB BLD-MCNC: 12.6 G/DL (ref 12–16)
LDLC SERPL CALC-MCNC: 124 MG/DL
MCH RBC QN AUTO: 33.3 PG (ref 26–34)
MCHC RBC AUTO-ENTMCNC: 32.1 G/DL (ref 32–36)
MCV RBC AUTO: 104 FL (ref 80–100)
NON HDL CHOLESTEROL: 144 MG/DL (ref 0–149)
NRBC BLD-RTO: 0 /100 WBCS (ref 0–0)
PLATELET # BLD AUTO: 157 X10*3/UL (ref 150–450)
POTASSIUM SERPL-SCNC: 4.7 MMOL/L (ref 3.5–5.3)
PROT SERPL-MCNC: 7.1 G/DL (ref 6.4–8.2)
RBC # BLD AUTO: 3.78 X10*6/UL (ref 4–5.2)
SODIUM SERPL-SCNC: 141 MMOL/L (ref 136–145)
TRIGL SERPL-MCNC: 99 MG/DL (ref 0–149)
TSH SERPL-ACNC: 1.82 MIU/L (ref 0.44–3.98)
VLDL: 20 MG/DL (ref 0–40)
WBC # BLD AUTO: 5.2 X10*3/UL (ref 4.4–11.3)

## 2024-10-23 PROCEDURE — 80061 LIPID PANEL: CPT

## 2024-10-23 PROCEDURE — 84443 ASSAY THYROID STIM HORMONE: CPT

## 2024-10-23 PROCEDURE — 85027 COMPLETE CBC AUTOMATED: CPT

## 2024-10-23 PROCEDURE — 36415 COLL VENOUS BLD VENIPUNCTURE: CPT

## 2024-10-23 PROCEDURE — 80053 COMPREHEN METABOLIC PANEL: CPT

## 2024-10-23 PROCEDURE — 83036 HEMOGLOBIN GLYCOSYLATED A1C: CPT

## 2024-11-04 ENCOUNTER — OFFICE VISIT (OUTPATIENT)
Dept: PRIMARY CARE | Facility: CLINIC | Age: 89
End: 2024-11-04
Payer: MEDICARE

## 2024-11-04 VITALS
HEIGHT: 64 IN | SYSTOLIC BLOOD PRESSURE: 140 MMHG | HEART RATE: 88 BPM | WEIGHT: 163 LBS | DIASTOLIC BLOOD PRESSURE: 72 MMHG | BODY MASS INDEX: 27.83 KG/M2 | TEMPERATURE: 96.6 F | OXYGEN SATURATION: 98 %

## 2024-11-04 DIAGNOSIS — Z23 ENCOUNTER FOR IMMUNIZATION: ICD-10-CM

## 2024-11-04 DIAGNOSIS — E11.9 TYPE 2 DIABETES MELLITUS WITHOUT COMPLICATION, WITHOUT LONG-TERM CURRENT USE OF INSULIN (MULTI): Primary | ICD-10-CM

## 2024-11-04 DIAGNOSIS — F41.9 ANXIETY: ICD-10-CM

## 2024-11-04 DIAGNOSIS — D64.9 CHRONIC ANEMIA: ICD-10-CM

## 2024-11-04 DIAGNOSIS — K58.0 IRRITABLE BOWEL SYNDROME WITH DIARRHEA: ICD-10-CM

## 2024-11-04 DIAGNOSIS — M48.062 SPINAL STENOSIS, LUMBAR REGION WITH NEUROGENIC CLAUDICATION: ICD-10-CM

## 2024-11-04 DIAGNOSIS — I10 ESSENTIAL HYPERTENSION: ICD-10-CM

## 2024-11-04 DIAGNOSIS — E78.2 MIXED HYPERLIPIDEMIA: ICD-10-CM

## 2024-11-04 PROCEDURE — 1160F RVW MEDS BY RX/DR IN RCRD: CPT | Performed by: FAMILY MEDICINE

## 2024-11-04 PROCEDURE — 99213 OFFICE O/P EST LOW 20 MIN: CPT | Performed by: FAMILY MEDICINE

## 2024-11-04 PROCEDURE — 90662 IIV NO PRSV INCREASED AG IM: CPT | Performed by: FAMILY MEDICINE

## 2024-11-04 PROCEDURE — 1159F MED LIST DOCD IN RCRD: CPT | Performed by: FAMILY MEDICINE

## 2024-11-04 PROCEDURE — 1036F TOBACCO NON-USER: CPT | Performed by: FAMILY MEDICINE

## 2024-11-04 PROCEDURE — 3077F SYST BP >= 140 MM HG: CPT | Performed by: FAMILY MEDICINE

## 2024-11-04 PROCEDURE — 1123F ACP DISCUSS/DSCN MKR DOCD: CPT | Performed by: FAMILY MEDICINE

## 2024-11-04 PROCEDURE — 3078F DIAST BP <80 MM HG: CPT | Performed by: FAMILY MEDICINE

## 2024-11-04 PROCEDURE — 1158F ADVNC CARE PLAN TLK DOCD: CPT | Performed by: FAMILY MEDICINE

## 2024-11-04 PROCEDURE — 1125F AMNT PAIN NOTED PAIN PRSNT: CPT | Performed by: FAMILY MEDICINE

## 2024-11-04 RX ORDER — BUSPIRONE HYDROCHLORIDE 15 MG/1
15 TABLET ORAL 2 TIMES DAILY
Qty: 180 TABLET | Refills: 3 | Status: SHIPPED | OUTPATIENT
Start: 2024-11-04 | End: 2025-11-04

## 2024-11-04 RX ORDER — CICLOPIROX 1 G/100ML
SHAMPOO TOPICAL
COMMUNITY
Start: 2024-10-03

## 2024-11-04 ASSESSMENT — PAIN SCALES - GENERAL: PAINLEVEL_OUTOF10: 4

## 2024-11-04 NOTE — PROGRESS NOTES
Outpatient Visit Note    Chief Complaint   Patient presents with    Follow-up         HPI:  Estrella Villafana is a 89 y.o. female with a past medical history significant for controlled diabetes, hypertension, hyperlipidemia, anxiety/depression, IBS, vitamin-D deficiency and cervical/lumbar degenerative disc disease currently followed by Dr. Ann of pain management and sacral wound followed wound care who presents to the office for follow-up.  She was last seen in the office in July 2024 for an annual Medicare Wellness Visit.           She had previously been established with Dr. Wells for primary care, with prior established relationship with Dr. Reyes whom she last saw in 2018.    Last panel blood work completed on 10/21/2024 including CBC, CMP, lipid panel, TSH and A1c.  Blood work was remarkable for stable kidney function dehydration and mildly elevated cholesterol levels.  A1c continues to show very good control at 5.6%.    Overall, they describes her health as good with no reports of recent illness or hospitalization. States that her diet is stable. In regards to physical activity, she attempts to stay active though activity is limited by chronic pain. They deny any significant sleep complaints. No reported issues of chest pain, shortness of breath, headaches, vision/hearing changes, abdominal pain, vomiting, diarrhea, melena, hematochezia, constipation or urinary symptoms.    Preventative Health Maintenance:  In regards to preventative health maintenance, last Tdap received in 2017. Flu shot due at this time. Pneumonia vaccination series previously initiated with patient up-to-date with Prevnar 20. Shingles vaccination series completed. COVID-19 vaccination series completed with patient currently due for most recent booster. No history of abnormals. DEXA scan in August 2024 noting osteopenia.    Diabetes:  As noted good sugar control on glipizide 5 mg extended release daily.  Was previously on metformin  which was discontinued secondary to prominent GI issues.  A1c sugar average showed great control at 5.6% in April. No reported polyuria, polydipsia, polyphagia or acute vision/sensation changes.           Chronic pain/fullness degenerative disc disease/polyarthralgias:  Continues to follow with with Dr. Ann of pain management. Last visit was on 9/4/2024. Has undergone injection therapy for lumbar degenerative disc disease.  Has additionally followed through with consultation with Dr. Hall of Rheumatology, with main focus being on right hip. Therapeutic injection scheduled with physical therapy encouraged.  Has done well with as needed tramadol which has been closely managed/monitored by pain management.    Sacral wound:  In review, the patient presented to the [facility] emergency department on 01/08/2023 secondary to complaints of abscess of rectum. She was ultimately diagnosed with cellulitis with perception that abscess had successfully drained. She was started on regimen of clindamycin 300 mg 3 times a day and discharged home with wound care instructions and recommendations for outpatient follow-up with PCP. At time initial appointment she reported compliance with clindamycin noting improvement with redness/swelling. Had assistance from family with changing dressing daily. Patient continued to have prominently open wound with significant granulation tissue with overt drainage reported. [facility]. Patient had successful follow-through with wound care center with resolved sacral ulcer.  Continued monitoring and supportive care was advocated along with plans to focus on mobility with chronic right hip pain.  Did have hip injection through rheumatology which provided no symptom relief.  Continues to focus on repositioning with no active irritation of her sacrum to which she did have more recent recurrent sacral ulceration.  Wound care services were reinitiated.  Has had  resolution of symptoms.  Has kept a sense of how area is doing with no active concerns/complaints           Anxiety/IBS/chronic diarrhea:  History of IBS like symptoms for the past several years, to which symptoms did improve upon removal of metformin.  Continues to utilize probiotics and as needed Imodium.    In regards to anxiety, has continued with BuSpar 15 mg twice daily.  States that regimen has continued to be effective with no reports of adverse side effects. Has reported ongoing struggles with insomnia but this has been kept in check with as needed nightly tramadol.  States that the medication has been helping significantly with improvement in her overall stress level           Advanced directives: Living will/power of     Current Medications  Current Outpatient Medications   Medication Instructions    acetaminophen (TYLENOL) 650 mg, Nightly    amLODIPine (NORVASC) 5 mg, oral, Daily    baclofen (LIORESAL) 10 mg, oral, Daily PRN    biotin 5 mg, Daily    busPIRone (BUSPAR) 15 mg, oral, 2 times daily    calcium carbonate (CALTRATE 600 ORAL) 600 mg, Daily    cholecalciferol (VITAMIN D3) 50 mcg, Daily    ciclopirox 1 % shampoo     ezetimibe (ZETIA) 10 mg, oral, Daily    fluocinonide (Lidex) 0.05 % external solution 1 Application, As needed    folic acid/multivit-min/lutein (CENTRUM SILVER ORAL) 1 tablet, Daily    ibuprofen 600 mg, 2 times daily    loperamide (Imodium A-D) 2 mg tablet 1 tablet, As needed    losartan (COZAAR) 50 mg, oral, Daily    traMADol (ULTRAM) 50 mg, oral, Every 6 hours PRN    vitamins A,C,E-zinc-copper (PreserVision AREDS) 4,296 mcg-226 mg-90 mg capsule 1 capsule, 2 times daily        Allergies  Allergies   Allergen Reactions    Atorvastatin Other and Myalgia     Joint pain    Lactose GI Upset    Dexamethasone Myalgia    Lisinopril Myalgia and Unknown    Triamterene-Hydrochlorothiazid Myalgia    Cephalexin Diarrhea        Past Medical History:   Diagnosis Date    Anxiety      "Cellulitis of finger of left hand 09/15/2023    Hyperglycemia     Hyperlipidemia     Hypertension     IBS (irritable bowel syndrome)     Lactose intolerance     Osteoarthritis     Sciatica     Spinal stenosis       Past Surgical History:   Procedure Laterality Date    ADENOIDECTOMY      CHOLECYSTECTOMY  08/28/2014    ROBOTIC SINGLE PORT LAPAROSCOPIC    OTHER SURGICAL HISTORY      Spinal implant    OTHER SURGICAL HISTORY      MOLAR EXTRACTION    SPINE SURGERY  08/23/2022    \"TUBE IN SPINE\"    TONSILLECTOMY       Family History   Problem Relation Name Age of Onset    Hypertension Mother      Stroke Mother      Stroke Father      Retinal detachment Other females      Social History     Tobacco Use    Smoking status: Never    Smokeless tobacco: Never   Vaping Use    Vaping status: Never Used   Substance Use Topics    Alcohol use: Never    Drug use: Never       ROS  All pertinent positive symptoms are included in the history of present illness.  All other systems have been reviewed and are negative and noncontributory to this patient's current ailments.    VITAL SIGNS  Vitals:    11/04/24 1119   BP: 140/72   Pulse: 88   Temp: 35.9 °C (96.6 °F)   SpO2: 98%       PHYSICAL EXAM  GENERAL APPEARANCE: alert and oriented, Pleasant and cooperative, No Acute Distress  HEENT: EOMI, PERRLA, MMM  HEART: RRR, normal S1S2, no murmurs, click or rubs  LUNGS: clear to auscultation bilaterally, no wheezes/rhonchi/rales  EXTREMITIES: no edema, normal ROM  SKIN: normal, no rash, unremarkable  NEUROLOGIC EXAM: non-focal exam  MUSCULOSKELETAL: no gross abnormalities  PSYCH: affect is normal, eye contact is good    Assessment/Plan   Problem List Items Addressed This Visit             ICD-10-CM    Anxiety F41.9     - Stable on current regimen         Relevant Medications    busPIRone (Buspar) 15 mg tablet    Essential hypertension I10     - Blood pressure stable in office today  -Continue on current regimen along with balanced diet and " moderation of salt/caffeine         Irritable bowel syndrome with diarrhea K58.0     - Stable         Spinal stenosis, lumbar region with neurogenic claudication M48.062     - Continue with routine pain management follow-up per protocol         Type 2 diabetes mellitus without complication, without long-term current use of insulin (Multi) - Primary E11.9     - A1c showed good control at 5.6% on most recent blood work; at this time, we will trial discontinuing glipizide  -Continue to focus on healthy, low-fat diet with moderation of carbohydrates  -Annual diabetic eye exam advocated         Chronic anemia D64.9     - Blood counts stable on most recent blood work         Mixed hyperlipidemia E78.2     - Cholesterol levels mildly elevated on most recent blood work  -Continue to focus on healthy, low-fat diet          Other Visit Diagnoses         Codes    Encounter for immunization     Z23    Relevant Orders    Flu vaccine, trivalent, preservative free, HIGH-DOSE, age 65y+ (Fluzone) (Completed)            Counseling:       Medication education:         Education:  The patient is counseled regarding potential side-effects of all new medications        Understanding:  Patient expressed understanding        Adherence:  No barriers to adherence identified    ** Please excuse any errors in grammar or translation related to this dictation. Voice recognition software was utilized to prepare this document. **

## 2024-11-04 NOTE — ASSESSMENT & PLAN NOTE
- A1c showed good control at 5.6% on most recent blood work; at this time, we will trial discontinuing glipizide  -Continue to focus on healthy, low-fat diet with moderation of carbohydrates  -Annual diabetic eye exam advocated

## 2024-11-04 NOTE — ASSESSMENT & PLAN NOTE
- Cholesterol levels mildly elevated on most recent blood work  -Continue to focus on healthy, low-fat diet

## 2024-11-04 NOTE — PATIENT INSTRUCTIONS
Problem List Items Addressed This Visit             ICD-10-CM    Anxiety F41.9     - Stable on current regimen         Relevant Medications    busPIRone (Buspar) 15 mg tablet    Essential hypertension I10     - Blood pressure stable in office today  -Continue on current regimen along with balanced diet and moderation of salt/caffeine         Irritable bowel syndrome with diarrhea K58.0     - Stable         Spinal stenosis, lumbar region with neurogenic claudication M48.062     - Continue with routine pain management follow-up per protocol         Type 2 diabetes mellitus without complication, without long-term current use of insulin (Multi) - Primary E11.9     - A1c showed good control at 5.6% on most recent blood work; going forward can consider discontinuing glipizide  -Continue to focus on healthy, low-fat diet with moderation of carbohydrates  -Annual diabetic eye exam advocated         Chronic anemia D64.9     - Blood counts stable on most recent blood work         Mixed hyperlipidemia E78.2     - Cholesterol levels mildly elevated on most recent blood work  -Continue to focus on healthy, low-fat diet          Other Visit Diagnoses         Codes    Encounter for immunization     Z23    Relevant Orders    Flu vaccine, trivalent, preservative free, HIGH-DOSE, age 65y+ (Fluzone)            Counseling:       Medication education:         Education:  The patient is counseled regarding potential side-effects of all new medications        Understanding:  Patient expressed understanding        Adherence:  No barriers to adherence identified    ** Please excuse any errors in grammar or translation related to this dictation. Voice recognition software was utilized to prepare this document. **

## 2024-12-19 DIAGNOSIS — M48.062 SPINAL STENOSIS, LUMBAR REGION WITH NEUROGENIC CLAUDICATION: ICD-10-CM

## 2024-12-19 DIAGNOSIS — M54.16 LUMBAR RADICULOPATHY: ICD-10-CM

## 2024-12-19 DIAGNOSIS — M51.369 DDD (DEGENERATIVE DISC DISEASE), LUMBAR: ICD-10-CM

## 2024-12-19 DIAGNOSIS — G89.29 OTHER CHRONIC PAIN: ICD-10-CM

## 2024-12-20 RX ORDER — BACLOFEN 10 MG/1
10 TABLET ORAL DAILY PRN
Qty: 30 TABLET | Refills: 1 | Status: SHIPPED | OUTPATIENT
Start: 2024-12-20

## 2025-01-06 ENCOUNTER — OFFICE VISIT (OUTPATIENT)
Dept: PAIN MEDICINE | Facility: CLINIC | Age: 89
End: 2025-01-06
Payer: MEDICARE

## 2025-01-06 VITALS
DIASTOLIC BLOOD PRESSURE: 89 MMHG | HEIGHT: 64 IN | BODY MASS INDEX: 27.83 KG/M2 | HEART RATE: 76 BPM | WEIGHT: 163 LBS | OXYGEN SATURATION: 95 % | RESPIRATION RATE: 18 BRPM | SYSTOLIC BLOOD PRESSURE: 164 MMHG

## 2025-01-06 DIAGNOSIS — Z79.899 HISTORY OF ONGOING TREATMENT WITH HIGH-RISK MEDICATION: ICD-10-CM

## 2025-01-06 DIAGNOSIS — M48.061 SPINAL STENOSIS, LUMBAR REGION WITHOUT NEUROGENIC CLAUDICATION: ICD-10-CM

## 2025-01-06 DIAGNOSIS — K58.0 IRRITABLE BOWEL SYNDROME WITH DIARRHEA: Primary | ICD-10-CM

## 2025-01-06 PROCEDURE — 1125F AMNT PAIN NOTED PAIN PRSNT: CPT | Performed by: PHYSICIAN ASSISTANT

## 2025-01-06 PROCEDURE — 99214 OFFICE O/P EST MOD 30 MIN: CPT | Performed by: PHYSICIAN ASSISTANT

## 2025-01-06 PROCEDURE — 3077F SYST BP >= 140 MM HG: CPT | Performed by: PHYSICIAN ASSISTANT

## 2025-01-06 PROCEDURE — 1159F MED LIST DOCD IN RCRD: CPT | Performed by: PHYSICIAN ASSISTANT

## 2025-01-06 PROCEDURE — 3079F DIAST BP 80-89 MM HG: CPT | Performed by: PHYSICIAN ASSISTANT

## 2025-01-06 PROCEDURE — 1036F TOBACCO NON-USER: CPT | Performed by: PHYSICIAN ASSISTANT

## 2025-01-06 PROCEDURE — 1160F RVW MEDS BY RX/DR IN RCRD: CPT | Performed by: PHYSICIAN ASSISTANT

## 2025-01-06 PROCEDURE — G2211 COMPLEX E/M VISIT ADD ON: HCPCS | Performed by: PHYSICIAN ASSISTANT

## 2025-01-06 RX ORDER — TRAMADOL HYDROCHLORIDE 50 MG/1
50 TABLET ORAL EVERY 12 HOURS
Qty: 60 TABLET | Refills: 2 | Status: SHIPPED | OUTPATIENT
Start: 2025-01-31 | End: 2025-05-01

## 2025-01-06 RX ORDER — LOPERAMIDE HCL 2 MG
2 TABLET ORAL AS NEEDED
Qty: 30 TABLET | Refills: 2 | Status: SHIPPED | OUTPATIENT
Start: 2025-01-06 | End: 2025-04-06

## 2025-01-06 ASSESSMENT — ENCOUNTER SYMPTOMS
EYES NEGATIVE: 1
ENDOCRINE NEGATIVE: 1
HEMATOLOGIC/LYMPHATIC NEGATIVE: 1
BACK PAIN: 1
ARTHRALGIAS: 1
CONSTITUTIONAL NEGATIVE: 1
CARDIOVASCULAR NEGATIVE: 1
MYALGIAS: 1
RESPIRATORY NEGATIVE: 1
WEAKNESS: 1
PSYCHIATRIC NEGATIVE: 1
ALLERGIC/IMMUNOLOGIC NEGATIVE: 1
GASTROINTESTINAL NEGATIVE: 1

## 2025-01-06 ASSESSMENT — LIFESTYLE VARIABLES
HOW MANY STANDARD DRINKS CONTAINING ALCOHOL DO YOU HAVE ON A TYPICAL DAY: PATIENT DOES NOT DRINK
HOW OFTEN DO YOU HAVE SIX OR MORE DRINKS ON ONE OCCASION: NEVER
HOW OFTEN DO YOU HAVE A DRINK CONTAINING ALCOHOL: NEVER
SKIP TO QUESTIONS 9-10: 1
AUDIT-C TOTAL SCORE: 0

## 2025-01-06 ASSESSMENT — PATIENT HEALTH QUESTIONNAIRE - PHQ9
SUM OF ALL RESPONSES TO PHQ9 QUESTIONS 1 & 2: 0
2. FEELING DOWN, DEPRESSED OR HOPELESS: NOT AT ALL
1. LITTLE INTEREST OR PLEASURE IN DOING THINGS: NOT AT ALL

## 2025-01-06 ASSESSMENT — PAIN - FUNCTIONAL ASSESSMENT: PAIN_FUNCTIONAL_ASSESSMENT: 0-10

## 2025-01-06 ASSESSMENT — PAIN SCALES - GENERAL
PAINLEVEL_OUTOF10: 8
PAINLEVEL_OUTOF10: 8

## 2025-01-06 ASSESSMENT — PAIN DESCRIPTION - DESCRIPTORS: DESCRIPTORS: ACHING

## 2025-01-06 NOTE — PROGRESS NOTES
MEDICATION NAME: Tramadol  STRENGTH: 50mg  LAST FILL DATE: 25  DATE LAST TAKEN: 24  QUANTITY FILLED: 60  QUANTITY REMAININ  COUNT COMPLETED BY: BRIGHT DAMON RN and TING LOUIS      UDS LAST COMPLETED:   CONTROLLED SUBSTANCES AGREEMENT LAST SIGNED:   ORT LAST COMPLETED:  Modified Oswestry disability form filled out annually.

## 2025-01-06 NOTE — PROGRESS NOTES
Subjective   Patient ID: Estrella Villafana is a 89 y.o. female who presents for Pain.  Patient is an 89-year-old female with spinal stenosis neurogenic claudication irritable bowel syndrome with diarrhea the presents today for follow-up.  Patient denies that her primary care is leaving she has been using OTC loperamide almost daily which does drastically control her diarrhea and does not cause constipation in conjunction with or without her pain medication her symptoms tend to be with prolonged standing and are reduced with sitting.  Patient uses her tramadol only at bedtime for benefit.  She denies any injuries traumas or falls.  Patient is frustrated about the denial of the second level for her [employer]        Review of Systems   Constitutional: Negative.    HENT: Negative.     Eyes: Negative.    Respiratory: Negative.     Cardiovascular: Negative.    Gastrointestinal: Negative.    Endocrine: Negative.    Genitourinary: Negative.    Musculoskeletal:  Positive for arthralgias, back pain and myalgias.   Skin: Negative.    Allergic/Immunologic: Negative.    Neurological:  Positive for weakness.   Hematological: Negative.    Psychiatric/Behavioral: Negative.         Objective   Physical Exam  Vitals and nursing note reviewed.   Constitutional:       Appearance: Normal appearance.   HENT:      Head: Normocephalic and atraumatic.      Right Ear: Ear canal and external ear normal.      Left Ear: Ear canal and external ear normal.      Nose: Nose normal.      Mouth/Throat:      Mouth: Mucous membranes are moist.      Pharynx: Oropharynx is clear.   Eyes:      Conjunctiva/sclera: Conjunctivae normal.      Pupils: Pupils are equal, round, and reactive to light.   Cardiovascular:      Rate and Rhythm: Normal rate.   Pulmonary:      Effort: Pulmonary effort is normal. No respiratory distress.   Musculoskeletal:      Cervical back: Normal range of motion and neck supple.      Lumbar back: Deformity (kyphotic) and tenderness  present. Decreased range of motion. Negative left straight leg raise test. No scoliosis.      Right hip: Crepitus present. Decreased range of motion.   Skin:     General: Skin is warm and dry.   Neurological:      Mental Status: She is alert.      Motor: Motor function is intact.      Coordination: Coordination is intact.      Gait: Gait abnormal (lumbar flexed).   Psychiatric:         Mood and Affect: Mood normal.         Thought Content: Thought content normal.       Assessment/Plan   Problem List Items Addressed This Visit             ICD-10-CM    Irritable bowel syndrome with diarrhea - Primary K58.0    Spinal stenosis, lumbar region without neurogenic claudication M48.061     I had nice discussion with the patient today our plan will be as follows.      Radiology: [ none at this time ]      Physically:  [ continue modification of activities, healthy lifestyle choice ]      Psychologically:  [ No acute psychological concerns. There are no mental health issues of which I am aware that are contributing to the patient's pain. There are no substance abuse or alcohol abuse issues of which I am aware that are contributing to the patient's pain.  ]      Medication: [ I will refill the patient's opioids today for 3 month.  The patient continues to see benefit and improvement in their quality of life and ability to maintain ADLs.  Patient educated about the risks of taking opioids and operating a motor vehicle.  Patient reports no adverse side effects to current medication regimen.  Current regimen does allow patient to maintain ADLs.  Patient reports no new neurologic symptoms, new pain areas, or exacerbation in pain today.  Patient reports they are happy with current treatment care path.    [place] was reviewed and was consistent with the history.    Patient has been educated on the risks, benefits, and alternatives of controlled substances as well as the proper way to store these medications.  The patient and I  discussed the nature of this medication and its side effects.  We discussed tolerance, physical dependence, psychological dependence, addiction and opioid-induced hyperalgesia.  We discussed the potential need to wean from this medication.  We discussed the availability of programs that can help with this process if necessary.  We discussed safety issues related to opioids including safe storage.  We discussed the fact that the patient should not drive an automobile or operate heavy machinery while taking this medication.  A prescription for naloxone was offered to the patient.  The patient will be re-evaluated for the need to continue opioid therapy in 90 days.  Pt to submit sample for tox screening. Could void so we switch to oral tox screening.  ]      Duration:  [ 3 months ]      Intervention:  [We did discuss that MRI findings and that how ineffective epidural injections and how that the [employer] may be beneficial but unfortunately insurance is not willing to make changes to their policy to cover it.  I encouraged patient to continue to try to do what she can for modification activity and body positioning to reduce exacerbations. ]         Rob Elizondo PA-C 01/06/25 11:21 AM

## 2025-01-11 LAB — SCAN RESULT: NORMAL

## 2025-02-27 DIAGNOSIS — M48.061 SPINAL STENOSIS, LUMBAR REGION WITHOUT NEUROGENIC CLAUDICATION: ICD-10-CM

## 2025-02-27 RX ORDER — TRAMADOL HYDROCHLORIDE 50 MG/1
50 TABLET ORAL EVERY 12 HOURS
Qty: 60 TABLET | Refills: 2 | Status: SHIPPED | OUTPATIENT
Start: 2025-02-27 | End: 2025-05-28

## 2025-03-03 ENCOUNTER — OFFICE VISIT (OUTPATIENT)
Dept: PRIMARY CARE | Facility: CLINIC | Age: 89
End: 2025-03-03
Payer: MEDICARE

## 2025-03-03 VITALS
OXYGEN SATURATION: 95 % | HEIGHT: 64 IN | DIASTOLIC BLOOD PRESSURE: 72 MMHG | HEART RATE: 85 BPM | SYSTOLIC BLOOD PRESSURE: 132 MMHG | BODY MASS INDEX: 28.34 KG/M2 | WEIGHT: 166 LBS | TEMPERATURE: 98.4 F

## 2025-03-03 DIAGNOSIS — I10 ESSENTIAL HYPERTENSION: ICD-10-CM

## 2025-03-03 DIAGNOSIS — M48.062 SPINAL STENOSIS, LUMBAR REGION WITH NEUROGENIC CLAUDICATION: ICD-10-CM

## 2025-03-03 DIAGNOSIS — K58.0 IRRITABLE BOWEL SYNDROME WITH DIARRHEA: ICD-10-CM

## 2025-03-03 DIAGNOSIS — D64.9 CHRONIC ANEMIA: ICD-10-CM

## 2025-03-03 DIAGNOSIS — F41.9 ANXIETY: ICD-10-CM

## 2025-03-03 DIAGNOSIS — E11.9 TYPE 2 DIABETES MELLITUS WITHOUT COMPLICATION, WITHOUT LONG-TERM CURRENT USE OF INSULIN (MULTI): Primary | ICD-10-CM

## 2025-03-03 DIAGNOSIS — E78.2 MIXED HYPERLIPIDEMIA: ICD-10-CM

## 2025-03-03 LAB — POC HEMOGLOBIN A1C: 6.1 % (ref 4.2–6.5)

## 2025-03-03 PROCEDURE — 3078F DIAST BP <80 MM HG: CPT | Performed by: FAMILY MEDICINE

## 2025-03-03 PROCEDURE — 99214 OFFICE O/P EST MOD 30 MIN: CPT | Performed by: FAMILY MEDICINE

## 2025-03-03 PROCEDURE — 1036F TOBACCO NON-USER: CPT | Performed by: FAMILY MEDICINE

## 2025-03-03 PROCEDURE — 1123F ACP DISCUSS/DSCN MKR DOCD: CPT | Performed by: FAMILY MEDICINE

## 2025-03-03 PROCEDURE — 1126F AMNT PAIN NOTED NONE PRSNT: CPT | Performed by: FAMILY MEDICINE

## 2025-03-03 PROCEDURE — 1158F ADVNC CARE PLAN TLK DOCD: CPT | Performed by: FAMILY MEDICINE

## 2025-03-03 PROCEDURE — 3075F SYST BP GE 130 - 139MM HG: CPT | Performed by: FAMILY MEDICINE

## 2025-03-03 PROCEDURE — 1160F RVW MEDS BY RX/DR IN RCRD: CPT | Performed by: FAMILY MEDICINE

## 2025-03-03 PROCEDURE — 83036 HEMOGLOBIN GLYCOSYLATED A1C: CPT | Mod: MUE | Performed by: FAMILY MEDICINE

## 2025-03-03 PROCEDURE — 1159F MED LIST DOCD IN RCRD: CPT | Performed by: FAMILY MEDICINE

## 2025-03-03 RX ORDER — EZETIMIBE 10 MG/1
10 TABLET ORAL DAILY
Qty: 90 TABLET | Refills: 3 | Status: SHIPPED | OUTPATIENT
Start: 2025-03-03 | End: 2026-03-03

## 2025-03-03 RX ORDER — LOSARTAN POTASSIUM 50 MG/1
50 TABLET ORAL DAILY
Qty: 90 TABLET | Refills: 3 | Status: SHIPPED | OUTPATIENT
Start: 2025-03-03 | End: 2026-03-03

## 2025-03-03 RX ORDER — AMLODIPINE BESYLATE 5 MG/1
5 TABLET ORAL DAILY
Qty: 90 TABLET | Refills: 3 | Status: SHIPPED | OUTPATIENT
Start: 2025-03-03 | End: 2026-03-03

## 2025-03-03 ASSESSMENT — PAIN SCALES - GENERAL: PAINLEVEL_OUTOF10: 0-NO PAIN

## 2025-03-03 NOTE — ASSESSMENT & PLAN NOTE
- A1c performed in office which was 6.1%. This is increased compared to 5.6% with last panel blood work though continues to show good control  -Continue to focus on healthy, low-fat diet with moderation of carbohydrates  -Annual diabetic eye exam advocated

## 2025-03-03 NOTE — PATIENT INSTRUCTIONS
Problem List Items Addressed This Visit             ICD-10-CM    Anxiety F41.9     - Stable on current regimen         Essential hypertension I10     - Blood pressure stable in office today  -Continue on current regimen along with balanced diet and moderation of salt/caffeine         Relevant Medications    losartan (Cozaar) 50 mg tablet    amLODIPine (Norvasc) 5 mg tablet    Irritable bowel syndrome with diarrhea K58.0     - Stable         Spinal stenosis, lumbar region with neurogenic claudication M48.062     - Continue with routine pain management follow-up per protocol         Type 2 diabetes mellitus without complication, without long-term current use of insulin (Multi) - Primary E11.9     - A1c performed in office which was 6.1%. This is increased compared to 5.6% with last panel blood work though continues to show good control  -Continue to focus on healthy, low-fat diet with moderation of carbohydrates  -Annual diabetic eye exam advocated         Relevant Orders    POCT glycosylated hemoglobin (Hb A1C) manually resulted (Completed)    Chronic anemia D64.9     - Blood counts stable on most recent blood work         Mixed hyperlipidemia E78.2     - Cholesterol levels mildly elevated on most recent blood work  -Continue to focus on healthy, low-fat diet         Relevant Medications    ezetimibe (Zetia) 10 mg tablet         Counseling:       Medication education:         Education:  The patient is counseled regarding potential side-effects of all new medications        Understanding:  Patient expressed understanding        Adherence:  No barriers to adherence identified    ** Please excuse any errors in grammar or translation related to this dictation. Voice recognition software was utilized to prepare this document. **

## 2025-03-03 NOTE — PROGRESS NOTES
Outpatient Visit Note    Chief Complaint   Patient presents with    Follow-up         HPI:  Estrella Villafana is a 89 y.o. female with a past medical history significant for controlled diabetes, hypertension, hyperlipidemia, anxiety/depression, IBS, vitamin-D deficiency and cervical/lumbar degenerative disc disease currently followed by Dr. Ann of pain management and sacral wound followed wound care who presents to the office for follow-up.  She was last seen in the office in November 2024 for follow-up           She had previously been established with Dr. Wells for primary care, with prior established relationship with Dr. Reyes whom she last saw in 2018.    Last panel blood work completed on 10/23/2024 including CBC, CMP, lipid panel, TSH and A1c.  Blood work was remarkable for stable kidney function dehydration and mildly elevated cholesterol levels.  A1c continues to show very good control at 5.6%.    Overall, they describes her health as good with no reports of recent illness or hospitalization. States that her diet is stable. In regards to physical activity, she attempts to stay active though activity is limited by chronic pain. They deny any significant sleep complaints. No reported issues of chest pain, shortness of breath, headaches, vision/hearing changes, abdominal pain, vomiting, diarrhea, melena, hematochezia, constipation or urinary symptoms.    Preventative Health Maintenance:  In regards to preventative health maintenance, last Tdap received in 2017. Flu shot due at this time. Pneumonia vaccination series previously initiated with patient up-to-date with Prevnar 20. Shingles vaccination series completed. COVID-19 vaccination series completed with patient currently due for most recent booster. No history of abnormals. DEXA scan in August 2024 noting osteopenia.    Diabetes:  Was previously on metformin which was discontinued secondary to prominent GI issues.  A1c sugar average showed great  control at 5.6% in April.  Ultimately glipizide was discontinued.  No reported polyuria, polydipsia, polyphagia or acute vision/sensation changes.           Chronic pain/fullness degenerative disc disease/polyarthralgias:  Continues to follow with with Dr. Ann of pain management. Last visit was on 9/4/2024. Has undergone injection therapy for lumbar degenerative disc disease.  Has additionally followed through with consultation with Dr. Hall of Rheumatology, with main focus being on right hip. Therapeutic injection scheduled with physical therapy encouraged.  Has done well with as needed tramadol which has been closely managed/monitored by pain management.  States that back pain continues to limit her activity, noting increased discomfort if she stands or walks for prolonged periods of time.    History of sacral ulcers:  In review, the patient presented to the [facility] emergency department on 01/08/2023 secondary to complaints of abscess of rectum. She was ultimately diagnosed with cellulitis with perception that abscess had successfully drained. She was started on regimen of clindamycin 300 mg 3 times a day and discharged home with wound care instructions and recommendations for outpatient follow-up with PCP. At time initial appointment she reported compliance with clindamycin noting improvement with redness/swelling. Had assistance from family with changing dressing daily. Patient continued to have prominently open wound with significant granulation tissue with overt drainage reported. [facility]. Patient had successful follow-through with wound care center with resolved sacral ulcer.  Continued monitoring and supportive care was advocated along with plans to focus on mobility with chronic right hip pain.  Did have hip injection through rheumatology which provided no symptom relief.  Continues to focus on repositioning with no active irritation of her sacrum to which she did  have more recent recurrent sacral ulceration.  Wound care services were reinitiated.  Has had resolution of symptoms.  Has kept a sense of how area is doing with no active concerns/complaints           Anxiety/IBS/chronic diarrhea:  History of IBS like symptoms for the past several years, to which symptoms did improve upon removal of metformin.  Continues to utilize probiotics and as needed Imodium.    In regards to anxiety, has continued with BuSpar 15 mg twice daily.  States that regimen has continued to be effective with no reports of adverse side effects. Has reported ongoing struggles with insomnia but this has been kept in check with as needed nightly tramadol.  States that the medication has been helping significantly with improvement in her overall stress level           Advanced directives: Living will/power of     Current Medications  Current Outpatient Medications   Medication Instructions    acetaminophen (TYLENOL) 650 mg, Nightly    amLODIPine (NORVASC) 5 mg, oral, Daily    baclofen (LIORESAL) 10 mg, oral, Daily PRN    biotin 5 mg, Daily    busPIRone (BUSPAR) 15 mg, oral, 2 times daily    calcium carbonate (CALTRATE 600 ORAL) 600 mg, Daily    cholecalciferol (VITAMIN D3) 50 mcg, Daily    ciclopirox 1 % shampoo 1 (one) time per week.    ezetimibe (ZETIA) 10 mg, oral, Daily    fluocinonide (Lidex) 0.05 % external solution 1 Application, As needed    folic acid/multivit-min/lutein (CENTRUM SILVER ORAL) 1 tablet, Daily    ibuprofen 600 mg, 2 times daily    loperamide (IMODIUM A-D) 2 mg, oral, As needed    losartan (COZAAR) 50 mg, oral, Daily    traMADol (ULTRAM) 50 mg, oral, Every 12 hours    vitamins A,C,E-zinc-copper (PreserVision AREDS) 4,296 mcg-226 mg-90 mg capsule 2 capsules, oral, Daily        Allergies  Allergies   Allergen Reactions    Atorvastatin Other and Myalgia     Joint pain    Lactose GI Upset    Dexamethasone Myalgia    Lisinopril Myalgia and Unknown     "Triamterene-Hydrochlorothiazid Myalgia    Cephalexin Diarrhea        Past Medical History:   Diagnosis Date    Anxiety     Cellulitis of finger of left hand 09/15/2023    Hyperglycemia     Hyperlipidemia     Hypertension     IBS (irritable bowel syndrome)     Lactose intolerance     Osteoarthritis     Sciatica     Spinal stenosis       Past Surgical History:   Procedure Laterality Date    ADENOIDECTOMY      CHOLECYSTECTOMY  08/28/2014    ROBOTIC SINGLE PORT LAPAROSCOPIC    OTHER SURGICAL HISTORY      Spinal implant    OTHER SURGICAL HISTORY      MOLAR EXTRACTION    SPINE SURGERY  08/23/2022    \"TUBE IN SPINE\"    TONSILLECTOMY       Family History   Problem Relation Name Age of Onset    Hypertension Mother      Stroke Mother      Stroke Father      Retinal detachment Other females      Social History     Tobacco Use    Smoking status: Never    Smokeless tobacco: Never   Vaping Use    Vaping status: Never Used   Substance Use Topics    Alcohol use: Never    Drug use: Never       ROS  All pertinent positive symptoms are included in the history of present illness.  All other systems have been reviewed and are negative and noncontributory to this patient's current ailments.    VITAL SIGNS  Vitals:    03/03/25 1111   BP: 132/72   Pulse: 85   Temp: 36.9 °C (98.4 °F)   SpO2: 95%         PHYSICAL EXAM  GENERAL APPEARANCE: alert and oriented, Pleasant and cooperative, No Acute Distress  HEENT: EOMI, PERRLA, MMM  HEART: RRR, normal S1S2, no murmurs, click or rubs  LUNGS: clear to auscultation bilaterally, no wheezes/rhonchi/rales  EXTREMITIES: no edema, normal ROM  SKIN: normal, no rash, unremarkable  NEUROLOGIC EXAM: non-focal exam  MUSCULOSKELETAL: no gross abnormalities  PSYCH: affect is normal, eye contact is good    Assessment/Plan   Problem List Items Addressed This Visit             ICD-10-CM    Anxiety F41.9     - Stable on current regimen         Essential hypertension I10     - Blood pressure stable in office " today  -Continue on current regimen along with balanced diet and moderation of salt/caffeine         Relevant Medications    losartan (Cozaar) 50 mg tablet    amLODIPine (Norvasc) 5 mg tablet    Irritable bowel syndrome with diarrhea K58.0     - Stable         Spinal stenosis, lumbar region with neurogenic claudication M48.062     - Continue with routine pain management follow-up per protocol         Type 2 diabetes mellitus without complication, without long-term current use of insulin (Multi) - Primary E11.9     - A1c performed in office which was 6.1%. This is increased compared to 5.6% with last panel blood work though continues to show good control  -Continue to focus on healthy, low-fat diet with moderation of carbohydrates  -Annual diabetic eye exam advocated         Relevant Orders    POCT glycosylated hemoglobin (Hb A1C) manually resulted (Completed)    Chronic anemia D64.9     - Blood counts stable on most recent blood work         Mixed hyperlipidemia E78.2     - Cholesterol levels mildly elevated on most recent blood work  -Continue to focus on healthy, low-fat diet         Relevant Medications    ezetimibe (Zetia) 10 mg tablet         Counseling:       Medication education:         Education:  The patient is counseled regarding potential side-effects of all new medications        Understanding:  Patient expressed understanding        Adherence:  No barriers to adherence identified    ** Please excuse any errors in grammar or translation related to this dictation. Voice recognition software was utilized to prepare this document. **

## 2025-04-10 ENCOUNTER — OFFICE VISIT (OUTPATIENT)
Dept: PAIN MEDICINE | Facility: CLINIC | Age: 89
End: 2025-04-10
Payer: MEDICARE

## 2025-04-10 VITALS
HEART RATE: 62 BPM | OXYGEN SATURATION: 98 % | RESPIRATION RATE: 16 BRPM | SYSTOLIC BLOOD PRESSURE: 138 MMHG | WEIGHT: 166 LBS | BODY MASS INDEX: 28.34 KG/M2 | HEIGHT: 64 IN | DIASTOLIC BLOOD PRESSURE: 78 MMHG

## 2025-04-10 DIAGNOSIS — M47.817 LUMBOSACRAL SPONDYLOSIS WITHOUT MYELOPATHY: ICD-10-CM

## 2025-04-10 DIAGNOSIS — M48.062 SPINAL STENOSIS, LUMBAR REGION, WITH NEUROGENIC CLAUDICATION: Primary | ICD-10-CM

## 2025-04-10 PROCEDURE — 99214 OFFICE O/P EST MOD 30 MIN: CPT | Performed by: ANESTHESIOLOGY

## 2025-04-10 PROCEDURE — 1160F RVW MEDS BY RX/DR IN RCRD: CPT | Performed by: ANESTHESIOLOGY

## 2025-04-10 PROCEDURE — 1125F AMNT PAIN NOTED PAIN PRSNT: CPT | Performed by: ANESTHESIOLOGY

## 2025-04-10 PROCEDURE — 1036F TOBACCO NON-USER: CPT | Performed by: ANESTHESIOLOGY

## 2025-04-10 PROCEDURE — 3078F DIAST BP <80 MM HG: CPT | Performed by: ANESTHESIOLOGY

## 2025-04-10 PROCEDURE — G2211 COMPLEX E/M VISIT ADD ON: HCPCS | Performed by: ANESTHESIOLOGY

## 2025-04-10 PROCEDURE — 3075F SYST BP GE 130 - 139MM HG: CPT | Performed by: ANESTHESIOLOGY

## 2025-04-10 PROCEDURE — 1159F MED LIST DOCD IN RCRD: CPT | Performed by: ANESTHESIOLOGY

## 2025-04-10 ASSESSMENT — ENCOUNTER SYMPTOMS
ARTHRALGIAS: 1
CARDIOVASCULAR NEGATIVE: 1
MYALGIAS: 1
RESPIRATORY NEGATIVE: 1
WEAKNESS: 1
HEMATOLOGIC/LYMPHATIC NEGATIVE: 1
CONSTITUTIONAL NEGATIVE: 1
EYES NEGATIVE: 1
ALLERGIC/IMMUNOLOGIC NEGATIVE: 1
BACK PAIN: 1
PSYCHIATRIC NEGATIVE: 1
ENDOCRINE NEGATIVE: 1
GASTROINTESTINAL NEGATIVE: 1

## 2025-04-10 ASSESSMENT — PAIN SCALES - GENERAL
PAINLEVEL_OUTOF10: 4
PAINLEVEL_OUTOF10: 4

## 2025-04-10 ASSESSMENT — PAIN - FUNCTIONAL ASSESSMENT: PAIN_FUNCTIONAL_ASSESSMENT: 0-10

## 2025-04-10 ASSESSMENT — PAIN DESCRIPTION - DESCRIPTORS: DESCRIPTORS: ACHING

## 2025-04-10 NOTE — PROGRESS NOTES
Did not bring pill bottle to visit. Reminded to bring to all refill visits      UDS LAST COMPLETED: 1/2025  CONTROLLED SUBSTANCES AGREEMENT LAST SIGNED: 7/2024  ORT LAST COMPLETED:11/2023  Modified Oswestry disability form filled out annually.

## 2025-04-10 NOTE — PROGRESS NOTES
Subjective   Patient ID: Estrella Villafana is a 89 y.o. female who presents for Back Pain.  Back Pain  Associated symptoms include weakness.     Patient here today for follow-up and management of her chronic back pain.  Patient reports that her tramadol is excellent and she is really not having much pain anymore.  Her biggest issue is the fatigue in her back.  She reports that when she stands for about 10 minutes her back starts getting very weak and she feels like she has to sit down.  She would like to know if there are any injections or treatments or options or therapies that could help her with her stamina while she standing on her feet.  She reports no other issues at this time.  Patient does use a cane for ambulation and stability.  Review of Systems   Constitutional: Negative.    HENT: Negative.     Eyes: Negative.    Respiratory: Negative.     Cardiovascular: Negative.    Gastrointestinal: Negative.    Endocrine: Negative.    Genitourinary: Negative.    Musculoskeletal:  Positive for arthralgias, back pain and myalgias.   Skin: Negative.    Allergic/Immunologic: Negative.    Neurological:  Positive for weakness.   Hematological: Negative.    Psychiatric/Behavioral: Negative.         Objective   Physical Exam  Vitals and nursing note reviewed.   Constitutional:       Appearance: Normal appearance.   HENT:      Head: Normocephalic and atraumatic.      Right Ear: Ear canal and external ear normal.      Left Ear: Ear canal and external ear normal.      Nose: Nose normal.      Mouth/Throat:      Mouth: Mucous membranes are moist.      Pharynx: Oropharynx is clear.   Eyes:      Conjunctiva/sclera: Conjunctivae normal.      Pupils: Pupils are equal, round, and reactive to light.   Cardiovascular:      Rate and Rhythm: Normal rate.   Pulmonary:      Effort: Pulmonary effort is normal. No respiratory distress.   Musculoskeletal:      Cervical back: Normal range of motion and neck supple.      Lumbar back: Deformity  (kyphotic) and tenderness present. Decreased range of motion. Negative left straight leg raise test. No scoliosis.      Right hip: Crepitus present. Decreased range of motion.   Skin:     General: Skin is warm and dry.   Neurological:      Mental Status: She is alert.      Motor: Motor function is intact.      Coordination: Coordination is intact.      Gait: Gait abnormal (lumbar flexed).   Psychiatric:         Mood and Affect: Mood normal.         Thought Content: Thought content normal.         Assessment/Plan   Problem List Items Addressed This Visit    None  Visit Diagnoses         Codes    Spinal stenosis, lumbar region, with neurogenic claudication    -  Primary M48.062    Relevant Orders    Referral to Physical Therapy    Lumbosacral spondylosis without myelopathy     M47.817    Relevant Orders    Referral to Physical Therapy             I nice discussion with the patient today our plan will be as follows.    Radiology: All available imaging was reviewed today with the patient.    Physically: Patient to start physical therapy 1-2 times per week to work on strength and stamina.    Psychologically: No issues at this time.    Medication: Patient doing very well on current tramadol dosing no changes.  No refills needed.    Duration: Greater than 1 year.    Intervention: No intervention at this time patient has failed all epidural steroid injections in the past.  We did attempt to repeat an Minuteman however her insurance will not cover it.        Please note that this report has been produced using speech recognition software. It may contain errors related to grammar, punctuation or spelling. Electronically signed, but not reviewed.   F2    Star Ann MD 04/10/25 11:33 AM

## 2025-05-12 ENCOUNTER — EVALUATION (OUTPATIENT)
Dept: PHYSICAL THERAPY | Facility: CLINIC | Age: 89
End: 2025-05-12
Payer: MEDICARE

## 2025-05-12 DIAGNOSIS — M48.062 SPINAL STENOSIS, LUMBAR REGION, WITH NEUROGENIC CLAUDICATION: ICD-10-CM

## 2025-05-12 DIAGNOSIS — M54.50 LOW BACK PAIN: Primary | ICD-10-CM

## 2025-05-12 DIAGNOSIS — M47.817 LUMBOSACRAL SPONDYLOSIS WITHOUT MYELOPATHY: ICD-10-CM

## 2025-05-12 PROCEDURE — 97162 PT EVAL MOD COMPLEX 30 MIN: CPT | Mod: GP | Performed by: PHYSICAL THERAPIST

## 2025-05-12 NOTE — PROGRESS NOTES
Physical Therapy    Physical Therapy Evaluation and Treatment    Patient Name: Estrella Villafana  MRN: [id number]  Encounter Date: 5/12/2025     Time Calculation  Start Time: 1450  Stop Time: 1535  Time Calculation (min): 45 min    Current Problem:   1. Low back pain  Follow Up In Physical Therapy      2. Spinal stenosis, lumbar region, with neurogenic claudication  Referral to Physical Therapy      3. Lumbosacral spondylosis without myelopathy  Referral to Physical Therapy        Relevant Past Medical History: anxiety, hyperglycemia, hyperlipidemia, hypertension, IBS, lactose intolerance, OA, sciatica, spinal stenosis  Surgical History: spinal implant (Minuteman L3; 2022). Cholecystectomy, adenoidectomy, tonsillectomy, molar extraction   Medications: acetaminophen, amlodipine, baclofen, biotin, buspirone, calcium carbonate, cholecalciferol, ciclopirox shampoo, ezetimibe, fluocinonide, folic acid/multivitamin/lutein, ibuprofen, losartan, tramadol, vitamins A/C/E/zinc-copper  Allergies: atorvastatin, lactose, dexamethasone, lisinopril, triamterene-hydrochlorothiazide, cephalexin    Precautions:   DEB Fall Risk: 3 (score of 4+ indicates fall risk)       SUBJECTIVE:  Pt presents with progressive worsening of low back pain and fatigue over the past 4-5 years. She participated in PT in 2023 but without significant relief. She sees Dr. Ann with pain management. She has had injections in the past which helped some. Takes tramadol - does not help during the day but does help her to sleep. Advil during the day helps. She ambulates with SPC in the community. She ambulates independently in the home. Admits to walking bent forward and tries to straighten up. No pain with sitting. Pain is better than it used to be, but is no moreso limited by fatigue in the low back resulting in only 5-10 minutes of standing tolerance. Does a seated peddler and performs neck stretches.    Imaging:  STUDY: XR LUMBAR SPINE 6+ VIEWS INCLUDING  "OBLIQUE FLEXION EXTENSION; 12/11/2023 11:07 am  FINDINGS: Minimal degenerative anterolisthesis L3 on L4. Otherwise adequate alignment. There is a spinous process device on the L3 spinous process. The lumbar vertebrae demonstrate no evidence for wedge fracture or compression deformity. Diffuse vacuum disc phenomena and degenerative disc disease is seen with moderate-severe disc space narrowing throughout the lumbar spine. Prominent facet degenerative changes are also noted.  IMPRESSION: No evidence for acute fracture. Prominent degenerative changes and degenerative disc disease as noted in the body of the report.     Pain:   Current: 0/10  Lowest: 0/10  Highest: 8/10  Location: low back across sacrum  Onset: insidious 4-5 years ago with progressive worsening  Description: fatigue  Aggravating Factors: standing/walking 5-10 minutes  Relieving Factors: advil, tramadol, sitting 5-/10 minutes  Sleep Pattern: sidelying, supine; sleeps well with tramadol   Previous Interventions: PT in the past       Red flags: denies numbness/tingling or saddle paresthesia, no changes to bowel or bladder    Occupation: retired  Hobbies: cross word puzzles, reading, TV, spending time outdoors  Home Living: , lives alone, entry stairs, laundry in basement, first floor bedroom/bathroom; pt's daughter assists with cleaning    Patient/Family Goal: \"strengthening my back\"    Outcome Measures: Modified Oswestry: 21/50        OBJECTIVE:    Observation/Posture: increased kyphosis, forward flexed in standing with hip/knee flexion  Palpation: (+) tenderness L5, sacrum, B SIJ    Sensation: B LE dermatomes intact to light touch    Range of Motion:  LUMBAR ROM AROM    LEFT RIGHT   Sidebend 25% limited 25% limited   Rotation 50% limited 50% limited   Flexion WNL   Extension Lacking 25% from neutral extension     HIP ROM PROM    LEFT RIGHT   Flexion WNL WNL   Extension Max limitation Max limitation   Internal Rotation Moderate limitation Moderate " limitation   External Rotation Mild limitation Mild limtiation     Strength:  LE MMT/MYOTOMES LEFT RIGHT   HIP   L2-L3 Flexion 4/5 4/5   KNEE   S2 Flexion 4+/5 4+/5   L2, L3 Extension 4+/5 4+/5   ANKLE   L4 Dorsiflexion 5/5 5/5   L5-S1 Plantarflexion 5/5 5/5     Special Testing:  SPECIAL TEST LEFT RIGHT   Slump (-) (-)   SLR 50% limited; (-) for neural tension 50% limited; (-) for neural tension   Prone Knee Bend (modified in S/L) 90 degrees; (-) for neural tension 90 degrees; (-) for neural tension     Functional Mobility:  Gait: independent short distances with forward flexed posture, flexed hips/knees, reduced step length with limited stance time  Bed Mobility: modified independent with increased time to complete  Sit to stand transfers: modified independent with B UE support   Stairs: non-reciprocal with UE support    Treatments:  Therapeutic Exercise: (15 minutes)  PT demonstration of the following with pt referring to handouts:  -Neutral sitting posture  -Neutral standing posture with use of chair back and/or wall to help facilitate  -Seated scapular retractions  -Seated or H/L hip ADD isometrics  -H/L PPT/glute sets  -H/L quad sets  -Supine modified july stretch    OP Education:   Initial evaluation completed, findings and plan of care discussed with patient. Patient education was provided regarding posture with instruction in use of of lumbar roll in sitting, recommended sleep positions with use of pillows for LEs, as well as symptom management through activity modification and hot/cold modalities. Patient was instructed in an individualized HEP with handout issued as below.    Response to treatment: Patient verbalized good understanding of eduction provided. Denied exacerbation of symptoms post treatment.       HEP:  Access Code: [email or web address]  URL: [email or web address]  Date: 05/12/2025  Prepared by: Nikkie Hernandez    Exercises  - Neutral Posture Sitting  - 10 reps - 10 hold  - Neutral Posture in  Standing  - 7 x weekly - 10 reps - 10 hold  - Seated Scapular Retraction  - 2 x daily - 7 x weekly - 10 reps - 5 hold  - Supine Hip Adduction Isometric with Ball  - 2 x daily - 7 x weekly - 10 reps - 5 hold  - Supine Posterior Pelvic Tilt  - 2 x daily - 7 x weekly - 10 reps - 5 hold  - Supine Quad Set  - 2 x daily - 7 x weekly - 10 reps - 5 hold  - Modified Jose Stretch  - 2 x daily - 7 x weekly - 3 sets - 30 hold    Patient Education  - Sleep Positions  - Office Posture    ASSESSMENT:   Estrella Villafana is a 89 y.o. female with degenerative disc disease and history of L3 spinous process device (MinuteMan) presenting with chronic, worsening low back pain and fatigue. Pt presents with significant postural impairments with inability to achieve neutral extension in standing. B hips and knees are flexed with reduced flexibility of the quads/hip flexors and hamstrings and reduced posterior chain strength. Pt presents with limited standing/walking tolerance to 5-10 minutes secondary to low back pain and fatigue. Transfers are also impaired requiring UE support for sit to stand. Patient would greatly benefit from skilled outpatient physical therapy services to address these impairments to facilitate symptom relief and improved level of function.    Complexity of Evaluation:   Based on the history including personal factors and/or comorbidities, examination of body systems including body structures and function, activity limitations, and/or participation restrictions, as well as clinical presentation, patient meets criteria for a MODERATE complexity evaluation.      PLAN:  OP PT PLAN:  Treatment/Interventions: Cryotherapy , Education/Instruction , Electrical Stimulation , Manual Therapy  , Self care/home management , Therapeutic activities , and Therapeutic exercise    PT Plan: Skilled PT   PT Frequency: 2 times per week   Duration: 6 weeks  Insurance: 2025: EVAL ONLY - Auth required - [employer] MEDICARE [employer] - [place] visits / 100%  COVERAGE / [place] VISITS /  [facility] [place]-[phone] / ds 5/11/25 //   Rehab Potential: Fair  Plan of Care Agreement: Patient         Goals:   1) Pt will decrease pain 50% from 0-8/10 to 0-4/10 for improved activity tolerance  2) Pt will improve sit to stand transfer to independent with UE support  3) Pt will improve standing/walking tolerance from 5-10 minutes to >15 hr to improve ability to perform ADLs  4) Pt will improve spinal extension to neutral in standing to reduce lumbopelvic strain  5) Pt will demonstrate compliance and independence with HEP for self management of condition at discharge

## 2025-05-13 ASSESSMENT — ENCOUNTER SYMPTOMS
LOSS OF SENSATION IN FEET: 0
DEPRESSION: 0
OCCASIONAL FEELINGS OF UNSTEADINESS: 0

## 2025-05-16 ENCOUNTER — APPOINTMENT (OUTPATIENT)
Dept: PHYSICAL THERAPY | Facility: CLINIC | Age: 89
End: 2025-05-16
Payer: MEDICARE

## 2025-05-22 ENCOUNTER — TREATMENT (OUTPATIENT)
Dept: PHYSICAL THERAPY | Facility: CLINIC | Age: 89
End: 2025-05-22
Payer: MEDICARE

## 2025-05-22 DIAGNOSIS — M54.50 LOW BACK PAIN: ICD-10-CM

## 2025-05-22 PROCEDURE — 97110 THERAPEUTIC EXERCISES: CPT | Mod: GP,CQ

## 2025-05-22 NOTE — PROGRESS NOTES
"Physical Therapy    Physical Therapy Treatment    Patient Name: Estrella Villafana  MRN: [id number]  Encounter Date: 5/22/2025     Time Calculation  Start Time: 1235  Stop Time: 1315  Time Calculation (min): 40 min    Visit #: 2  out of 12  Insurance: 05/20/2025: 12V PT APPROVED 05/12/2025-06/23/2025 2025: EVAL ONLY - Auth required - [facility] MEDICARE LPP[place] - [place] / 100% [place] / [place] /  [id number] / ds 5/11/25 //   Evaluation date: 5-12-25      Current Problem:   1. Low back pain  Follow Up In Physical Therapy          SUBJECTIVE:   Pt denies change in sx level since initial PT evaluation last session. Is performing HEP daily at least 1 time, sometimes 2 depending on how she feels. Today she is more achy secondary to cold/rainy weather . Pt would like to review HEP to make sure she is performing correctly      Precautions:   STEADI Fall Risk: 3 (score of 4+ indicates fall risk)      Pain:   Start of session:        OBJECTIVE:    Gait with     Treatments:  Therapeutic Exercise: (40 minutes)   Review of current HEP  -seated posture practice  -_standing holding onto back of chair posture practice   -seated scap adduction 1 x 10  -supine hip adduction isometric w/ ball 2 x 1-  -supine glute set+PPT 2 x 10 (cues needed)  -Supine QS 2 x 10 L/R (cues needed)  -supine hip flexor stretch off side of mat 30 sec x 3 L/R   Added:  -seated bilateral shoulder ext rotation 2 x 10..added to HEP   -H/L bent knee fall out 5 sec hold 1 x 10 L/R ...added to HEP  Seated trunk flexion stretch \"feels good\"    Manual Therapy: ( minutes)    Gait Training: ( minutes)    Neuromuscular Re-education: ( minutes)    Therapeutic Activity: ( minutes)       HEP:  Access Code: [email or web address]  URL: [email or web address]  Date: 05/12/2025  Prepared by: Nikkie Hernandez     Exercises  - Neutral Posture Sitting  - 10 reps - 10 hold  - Neutral Posture in Standing  - 7 x weekly - 10 reps - 10 hold  - Seated Scapular Retraction  - 2 x daily " - 7 x weekly - 10 reps - 5 hold  - Supine Hip Adduction Isometric with Ball  - 2 x daily - 7 x weekly - 10 reps - 5 hold  - Supine Posterior Pelvic Tilt  - 2 x daily - 7 x weekly - 10 reps - 5 hold  - Supine Quad Set  - 2 x daily - 7 x weekly - 10 reps - 5 hold  - Modified Jose Stretch  - 2 x daily - 7 x weekly - 3 sets - 30 hold    ASSESSMENT:   Todays session focused on review of HEP  to ensure proper performance of exercises .  Pt was able to complete home exercise program with only minor cues, able to add on 2 exercises this session.     Post session pain:   Denies increase  PLAN:  OP PT PLAN:  Treatment/Interventions: Cryotherapy , Education/Instruction , Electrical Stimulation , Manual Therapy  , Self care/home management , Therapeutic activities , and Therapeutic exercise    PT Plan: Skilled PT   PT Frequency: 2 times per week   Duration: 6 weeks  Insurance: 2025: EVAL ONLY - Auth required - [facility] MEDICARE LPPO - [place] visits / 100% C[place]AGE / [place] VIS[place] /  [id number] / ds 5/11/25 //   Rehab Potential: Fair  Plan of Care Agreement: Patient    Goals:   1) Pt will decrease pain 50% from 0-8/10 to 0-4/10 for improved activity tolerance  2) Pt will improve sit to stand transfer to independent with UE support  3) Pt will improve standing/walking tolerance from 5-10 minutes to >15 hr to improve ability to perform ADLs  4) Pt will improve spinal extension to neutral in standing to reduce lumbopelvic strain  5) Pt will demonstrate compliance and independence with HEP for self management of condition at discharge

## 2025-05-27 ENCOUNTER — TREATMENT (OUTPATIENT)
Dept: PHYSICAL THERAPY | Facility: CLINIC | Age: 89
End: 2025-05-27
Payer: MEDICARE

## 2025-05-27 DIAGNOSIS — M54.50 LOW BACK PAIN: ICD-10-CM

## 2025-05-27 PROCEDURE — 97110 THERAPEUTIC EXERCISES: CPT | Mod: GP | Performed by: PHYSICAL THERAPIST

## 2025-05-27 NOTE — PROGRESS NOTES
"Physical Therapy    Physical Therapy Treatment    Patient Name: Estrella Villafana  MRN: [id number]  Encounter Date: 5/27/2025     Time Calculation  Start Time: 0930  Stop Time: 1010  Time Calculation (min): 40 min    Visit #: 3  out of 13 (authorized until 06/23/2025)  Insurance:  2025: EVAL ONLY - Auth required - [facility] MEDICARE LPPO - [place] visits / 100% C[place]AGE / [place] VIS[place] /  12 follow up PT visits approved 05/12/2025-06/23/2025  Evaluation date: 5/12/25    Current Problem:   1. Low back pain  Follow Up In Physical Therapy        SUBJECTIVE:   Pt reports ongoing compliance with HEP; a few exercises are getting easier; has a few questions about some of the others.      Precautions:   STEADI Fall Risk: 3 (score of 4+ indicates fall risk)      Pain:   Start of session: 0/10       OBJECTIVE:    Gait: modified independent with SPC, forward flexed    Treatments:  Therapeutic Exercise: (40 minutes)  NuStep (seat 7, UE 7) L2 x5 minutes   Seated neutral posture review  Seated B shoulder ER + scapular retraction x20 -- review for HEP  Review of H/L bend knee fall out with PT demonstration   Standing rockerboard B calf stretch 3x30\"  Sit to stand to/from elevated mat, hands on thighs, x10 -- added to HEP  Standing rows (L2 resistance band) with CGA x10  Standing hip extension leaning over mat, x10 ea LE  Supine DKTC AAROM with physioball x10  Supine B quad/glute set 3-5\" x10 with feet elevated on physioball   H/L bent knee fall out for HEP review    HEP:  Access Code: [email or web address], URL: [email or web address]  Date: 05/27/2025, Prepared by: Nikkie Hernandez  - Sit to Stand  - 1 x daily - 7 x weekly - 2 sets - 10 reps    5/12/25  -seated bilateral shoulder ext rotation 2 x 10  -H/L bent knee fall out 5 sec hold 1 x 10 L/R     Access Code: [email or web address], URL: [email or web address]  Date: 05/12/2025, Prepared by: Nikkie Hernandez  - Neutral Posture Sitting  - 10 reps - 10 hold  - Neutral Posture in Standing  - 7 x weekly - 10 " reps - 10 hold  - Seated Scapular Retraction  - 2 x daily - 7 x weekly - 10 reps - 5 hold  - Supine Hip Adduction Isometric with Ball  - 2 x daily - 7 x weekly - 10 reps - 5 hold  - Supine Posterior Pelvic Tilt  - 2 x daily - 7 x weekly - 10 reps - 5 hold  - Supine Quad Set  - 2 x daily - 7 x weekly - 10 reps - 5 hold  - Modified Jose Stretch  - 2 x daily - 7 x weekly - 3 sets - 30 hold    ASSESSMENT:   Pt with limited LE functional strength, requiring utilization of posterior thighs against mat with sit to stands  and tactile cues to facilitate forward weight shift upon achieving standing position. CGA provided with progressive postural strengthening secondary to ongoing postural impairments and fall risk.     Post session pain: 0/10    PLAN:  OP PT PLAN:  Treatment/Interventions: Cryotherapy , Education/Instruction , Electrical Stimulation , Manual Therapy  , Self care/home management , Therapeutic activities , and Therapeutic exercise    PT Plan: Skilled PT   PT Frequency: 2 times per week   Duration: 6 weeks  Insurance: 2025: EVAL ONLY - Auth required - [employer] MEDICARE LPPO - [place] visits / 100% COVERAGE / [place] VISITS /  [id number] / ds 5/11/25 //   Rehab Potential: Fair  Plan of Care Agreement: Patient    Goals:   1) Pt will decrease pain 50% from 0-8/10 to 0-4/10 for improved activity tolerance -PROGRESSING  2) Pt will improve sit to stand transfer to independent with UE support  -PROGRESSING  3) Pt will improve standing/walking tolerance from 5-10 minutes to >15 hr to improve ability to perform ADLs  -PROGRESSING  4) Pt will improve spinal extension to neutral in standing to reduce lumbopelvic strain  -PROGRESSING  5) Pt will demonstrate compliance and independence with HEP for self management of condition at discharge  -PROGRESSING

## 2025-06-02 ENCOUNTER — TREATMENT (OUTPATIENT)
Dept: PHYSICAL THERAPY | Facility: CLINIC | Age: 89
End: 2025-06-02
Payer: MEDICARE

## 2025-06-02 DIAGNOSIS — M54.50 LOW BACK PAIN: ICD-10-CM

## 2025-06-02 PROCEDURE — 97110 THERAPEUTIC EXERCISES: CPT | Mod: GP | Performed by: PHYSICAL THERAPIST

## 2025-06-02 NOTE — PROGRESS NOTES
"Physical Therapy    Physical Therapy Treatment    Patient Name: Estrella Villafana  MRN: [id number]  Encounter Date: 6/2/2025     Time Calculation  Start Time: 1148  Stop Time: 1230  Time Calculation (min): 42 min    Visit #: 4  out of 13 (authorized until 06/23/2025)  Insurance:  2025: EVAL ONLY - Auth required - [facility] MEDICARE LPPO - [place] visits / 100% C[place]AGE / [place] VISITS /  12 follow up PT visits approved 05/12/2025-06/23/2025  Evaluation date: 5/12/25    Current Problem:   1. Low back pain  Follow Up In Physical Therapy        SUBJECTIVE:   Pt reports ongoing compliance with HEP, a couple days slower than others based on how she is feeling. Denies anything new. Standing tolerance remains limited. Trying to stand up as straight as she can; head wants to fall forward so doing head exercises on her own.      Precautions:   STEADI Fall Risk: 3 (score of 4+ indicates fall risk)      Pain:   Start of session: 0/10       OBJECTIVE:    Gait: modified independent with SPC, forward flexed    Treatments:  Therapeutic Exercise: (42 minutes)  NuStep (seat 7, UE 7) L2 x5 minutes     Supine with wedge + 2 pillows:     B dowel press to overhead flexion 5\" holds x10     B shoulder ER with hands behind head 5\" x10       B shoulder extension (L1 resistance band) x10     Cervical retraction 3\" x10     TrA brace + glute sets 3\" x10    Seated thoracic extension with lumbar roll and thoracic horizontal 1/2 foam roll x10  Standing hip flexor stretch 2x30\" ea TALAT      HEP:  Access Code: [email or web address]  URL: [email or web address]  Date: 06/02/2025  Prepared by: Nikkie Hernandez    Exercises  - Supine Shoulder Flexion Extension AAROM with Dowel  - 1 x daily - 7 x weekly - 10 reps - 3 hold  - Supine Chin Tuck  - 1 x daily - 7 x weekly - 10 reps - 3 hold  - Seated Passive Cervical Retraction  - 1 x daily - 7 x weekly - 10 reps - 3 hold  - Standing Hip Flexor Stretch  - 1 x daily - 7 x weekly - 3 sets - 30 hold    Access Code: [email or web address], URL: " [email or web address]  Date: 05/27/2025, Prepared by: Nikkie Hernandez  - Sit to Stand  - 1 x daily - 7 x weekly - 2 sets - 10 reps    5/12/25  -seated bilateral shoulder ext rotation 2 x 10  -H/L bent knee fall out 5 sec hold 1 x 10 L/R     Access Code: [email or web address], URL: [email or web address]  Date: 05/12/2025, Prepared by: Nikkie Hernandez  - Neutral Posture Sitting  - 10 reps - 10 hold  - Neutral Posture in Standing  - 7 x weekly - 10 reps - 10 hold  - Seated Scapular Retraction  - 2 x daily - 7 x weekly - 10 reps - 5 hold  - Supine Hip Adduction Isometric with Ball  - 2 x daily - 7 x weekly - 10 reps - 5 hold  - Supine Posterior Pelvic Tilt  - 2 x daily - 7 x weekly - 10 reps - 5 hold  - Supine Quad Set  - 2 x daily - 7 x weekly - 10 reps - 5 hold  - Modified Jose Stretch  - 2 x daily - 7 x weekly - 3 sets - 30 hold    ASSESSMENT:   Worked towards improving cervicothoracic and hip extension this date with inclusion of targeted interventions in updated HEP. Some mild pain in L triceps region with overhead mobility that did not worsen with repetition.     Post session pain: 0/10    PLAN:  OP PT PLAN:  Treatment/Interventions: Cryotherapy , Education/Instruction , Electrical Stimulation , Manual Therapy  , Self care/home management , Therapeutic activities , and Therapeutic exercise    PT Plan: Skilled PT   PT Frequency: 2 times per week   Duration: 6 weeks  Insurance: 2025: EVAL ONLY - Auth required - [facility] MEDICARE LPPO - [place] visits / 100% COVERAGE / [place] VIS[place] /  [id number] / ds 5/11/25 //   Rehab Potential: Fair  Plan of Care Agreement: Patient    Goals:   1) Pt will decrease pain 50% from 0-8/10 to 0-4/10 for improved activity tolerance -PROGRESSING  2) Pt will improve sit to stand transfer to independent with UE support  -PROGRESSING  3) Pt will improve standing/walking tolerance from 5-10 minutes to >15 hr to improve ability to perform ADLs  -PROGRESSING  4) Pt will improve spinal  extension to neutral in standing to reduce lumbopelvic strain  -PROGRESSING  5) Pt will demonstrate compliance and independence with HEP for self management of condition at discharge  -PROGRESSING

## 2025-06-05 ENCOUNTER — TREATMENT (OUTPATIENT)
Dept: PHYSICAL THERAPY | Facility: CLINIC | Age: 89
End: 2025-06-05
Payer: MEDICARE

## 2025-06-05 DIAGNOSIS — M54.50 LOW BACK PAIN: ICD-10-CM

## 2025-06-05 PROCEDURE — 97110 THERAPEUTIC EXERCISES: CPT | Mod: GP,CQ

## 2025-06-05 NOTE — PROGRESS NOTES
"Physical Therapy    Physical Therapy Treatment    Patient Name: Estrella Villafana  MRN: [id number]  Encounter Date: 6/5/2025     Time Calculation  Start Time: 1150  Stop Time: 1230  Time Calculation (min): 40 min    Visit #: 5  out of 13 (authorized until 06/23/2025)  Insurance:  2025: EVAL ONLY - Auth required - [facility] MEDICARE LPPO - [place] visits / 100% COVERAGE / [place] VISITS /  12 follow up PT visits approved 05/12/2025-06/23/2025  Evaluation date: 5/12/25    Current Problem:   1. Low back pain  Follow Up In Physical Therapy        SUBJECTIVE:   Last 2 days sx level in her back was high which she feels was related to weather. Did not perform her home exercises for those 2 days because\"everything hurt\" feels better today and feels she can resume her exercises today      Precautions:   STEADI Fall Risk: 3 (score of 4+ indicates fall risk)      Pain:   Start of session: 1/10       OBJECTIVE:    Gait: modified independent with SPC, forward flexed    Treatments:  Therapeutic Exercise: (40 minutes)  NuStep (seat 7, UE 7) L3 x5 minutes   Standing hip flexor stretch 2x30\" ea LE  Standing gastroc stretch at rocker board 15 sec x 3  Sit to stand from elevated mat 1 x 5 (challenging)  Supine with wedge + 2 pillows:     B dowel press to overhead flexion 5\" holds x10     Cervical retraction 3 sec 1 x 10      B shoulder ER with hands behind head 5\" x10       B tricep extension  (L1 resistance band)  2 x10      B bicep curl 1# 2 x 10     Abdominal brace + glute sets 3\"  2x10  Seated thoracic extension with lumbar roll and thoracic horizontal 1/2 foam roll x10  Seated cervical rotation 1 x 10        HEP:  Access Code: [email or web address]  URL: [email or web address]  Date: 06/02/2025  Prepared by: Nikkie Hernandez    Exercises  - Supine Shoulder Flexion Extension AAROM with Dowel  - 1 x daily - 7 x weekly - 10 reps - 3 hold  - Supine Chin Tuck  - 1 x daily - 7 x weekly - 10 reps - 3 hold  - Seated Passive Cervical Retraction  - 1 x daily - 7 x " weekly - 10 reps - 3 hold  - Standing Hip Flexor Stretch  - 1 x daily - 7 x weekly - 3 sets - 30 hold    Access Code: [email or web address], URL: [email or web address]  Date: 05/27/2025, Prepared by: Nikkie Hernandez  - Sit to Stand  - 1 x daily - 7 x weekly - 2 sets - 10 reps    5/12/25  -seated bilateral shoulder ext rotation 2 x 10  -H/L bent knee fall out 5 sec hold 1 x 10 L/R     Access Code: [email or web address], URL: [email or web address]  Date: 05/12/2025, Prepared by: Nikkie Hernandez  - Neutral Posture Sitting  - 10 reps - 10 hold  - Neutral Posture in Standing  - 7 x weekly - 10 reps - 10 hold  - Seated Scapular Retraction  - 2 x daily - 7 x weekly - 10 reps - 5 hold  - Supine Hip Adduction Isometric with Ball  - 2 x daily - 7 x weekly - 10 reps - 5 hold  - Supine Posterior Pelvic Tilt  - 2 x daily - 7 x weekly - 10 reps - 5 hold  - Supine Quad Set  - 2 x daily - 7 x weekly - 10 reps - 5 hold  - Modified Jose Stretch  - 2 x daily - 7 x weekly - 3 sets - 30 hold    ASSESSMENT:    Improving ability to perform exercises with good form, reducing verbal cues for isolating intended target muscle with exercise. Continue to progress postural strengthening, adding in UE strengthening this session     Post session pain: feels better    PLAN:  OP PT PLAN:  Treatment/Interventions: Cryotherapy , Education/Instruction , Electrical Stimulation , Manual Therapy  , Self care/home management , Therapeutic activities , and Therapeutic exercise    PT Plan: Skilled PT   PT Frequency: 2 times per week   Duration: 6 weeks  Insurance: 2025: EVAL ONLY - Auth required - [facility] MEDICARE LPPO - [place] visits / 100% COVERAGE / [place] VISITS /  [id number] / ds 5/11/25 //   Rehab Potential: Fair  Plan of Care Agreement: Patient    Goals:   1) Pt will decrease pain 50% from 0-8/10 to 0-4/10 for improved activity tolerance -PROGRESSING  2) Pt will improve sit to stand transfer to independent with UE support  -PROGRESSING  3) Pt will improve  standing/walking tolerance from 5-10 minutes to >15 hr to improve ability to perform ADLs  -PROGRESSING  4) Pt will improve spinal extension to neutral in standing to reduce lumbopelvic strain  -PROGRESSING  5) Pt will demonstrate compliance and independence with HEP for self management of condition at discharge  -PROGRESSING

## 2025-06-09 ENCOUNTER — TREATMENT (OUTPATIENT)
Dept: PHYSICAL THERAPY | Facility: CLINIC | Age: 89
End: 2025-06-09
Payer: MEDICARE

## 2025-06-09 DIAGNOSIS — M54.50 LOW BACK PAIN: Primary | ICD-10-CM

## 2025-06-09 PROCEDURE — 97110 THERAPEUTIC EXERCISES: CPT | Mod: GP | Performed by: PHYSICAL THERAPIST

## 2025-06-09 NOTE — PROGRESS NOTES
"Physical Therapy    Physical Therapy Treatment    Patient Name: Estrella Villafana  MRN: [id number]  Encounter Date: 6/9/2025     Time Calculation  Start Time: 1150  Stop Time: 1230  Time Calculation (min): 40 min    Visit #: 6  out of 13 (authorized until 06/23/2025)  Insurance:  2025: EVAL ONLY - Auth required - [facility] MEDICARE LPPO - [place] visits / 100% COVERAGE / [place] VISITS /  12 follow up PT visits approved 05/12/2025-06/23/2025  Evaluation date: 5/12/25    Current Problem:   1. Low back pain  Follow Up In Physical Therapy        SUBJECTIVE:   Not experiencing the pain in the back like she was last week. Took awhile to get going this morning. Reports continued compliance with HEP.     Precautions:   STEADI Fall Risk: 3 (score of 4+ indicates fall risk)      Pain:   Start of session: 0/10       OBJECTIVE:    Gait: modified independent with SPC, forward flexed    Treatments:  Therapeutic Exercise: (40 minutes)  NuStep (seat 7, UE 7) L3 x5 minutes   Standing B heel raises x15 with B UE support of rail  Standing B toes raises x15 with B UE support of rail  Standing gastroc stretch x20\" ea LE  6\" step taps with unilateral UE support of rail + TrA brace x10 ea LE  Standing rows (L2 resistance) x15 with SBA  Sidestepping with B UE support of rail x8 L/R  Backwards walking with unilateral UE support of rail x8    HEP:  Access Code: [email or web address]  URL: [email or web address]  Date: 06/02/2025  Prepared by: Nikkie Hernandez    Exercises  - Supine Shoulder Flexion Extension AAROM with Dowel  - 1 x daily - 7 x weekly - 10 reps - 3 hold  - Supine Chin Tuck  - 1 x daily - 7 x weekly - 10 reps - 3 hold  - Seated Passive Cervical Retraction  - 1 x daily - 7 x weekly - 10 reps - 3 hold  - Standing Hip Flexor Stretch  - 1 x daily - 7 x weekly - 3 sets - 30 hold    Access Code: [email or web address], URL: [email or web address]  Date: 05/27/2025, Prepared by: Nikkie Hernandez  - Sit to Stand  - 1 x daily - 7 x weekly - 2 sets - 10 " reps    5/12/25  -seated bilateral shoulder ext rotation 2 x 10  -H/L bent knee fall out 5 sec hold 1 x 10 L/R     Access Code: [email or web address], URL: [email or web address]  Date: 05/12/2025, Prepared by: Nikkie Hernandez  - Neutral Posture Sitting  - 10 reps - 10 hold  - Neutral Posture in Standing  - 7 x weekly - 10 reps - 10 hold  - Seated Scapular Retraction  - 2 x daily - 7 x weekly - 10 reps - 5 hold  - Supine Hip Adduction Isometric with Ball  - 2 x daily - 7 x weekly - 10 reps - 5 hold  - Supine Posterior Pelvic Tilt  - 2 x daily - 7 x weekly - 10 reps - 5 hold  - Supine Quad Set  - 2 x daily - 7 x weekly - 10 reps - 5 hold  - Modified Jose Stretch  - 2 x daily - 7 x weekly - 3 sets - 30 hold    ASSESSMENT:   Pt's endurance remains limited, experiencing intermittent dyspnea and requiring frequent sitting rest breaks. Improving ability to achieve and maintain a more neutral hip/spinal posture for increasing durations with standing interventions.     Post session pain: denies    PLAN:  OP PT PLAN:  Treatment/Interventions: Cryotherapy , Education/Instruction , Electrical Stimulation , Manual Therapy  , Self care/home management , Therapeutic activities , and Therapeutic exercise    PT Plan: Skilled PT   PT Frequency: 2 times per week   Duration: 6 weeks  Insurance: 2025: EVAL ONLY - Auth required - [facility] MEDICARE LPPO - [place] visits / 100% COVERAGE / [place] VISITS /  [id number] / ds 5/11/25 //   Rehab Potential: Fair  Plan of Care Agreement: Patient    Goals:   1) Pt will decrease pain 50% from 0-8/10 to 0-4/10 for improved activity tolerance -PROGRESSING  2) Pt will improve sit to stand transfer to independent with UE support  -PROGRESSING  3) Pt will improve standing/walking tolerance from 5-10 minutes to >15 hr to improve ability to perform ADLs  -PROGRESSING  4) Pt will improve spinal extension to neutral in standing to reduce lumbopelvic strain  -PROGRESSING  5) Pt will demonstrate compliance and  independence with HEP for self management of condition at discharge  -PROGRESSING

## 2025-06-12 ENCOUNTER — TREATMENT (OUTPATIENT)
Dept: PHYSICAL THERAPY | Facility: CLINIC | Age: 89
End: 2025-06-12
Payer: MEDICARE

## 2025-06-12 DIAGNOSIS — M54.50 LOW BACK PAIN: ICD-10-CM

## 2025-06-12 PROCEDURE — 97110 THERAPEUTIC EXERCISES: CPT | Mod: GP,CQ

## 2025-06-12 NOTE — PROGRESS NOTES
"Physical Therapy    Physical Therapy Treatment    Patient Name: Estrella Villafana  MRN: [id number]  Encounter Date: 6/12/2025     Time Calculation  Start Time: 1020  Stop Time: 1100  Time Calculation (min): 40 min    Visit #: 7  out of 13 (authorized until 06/23/2025)  Insurance:  2025: EVAL ONLY - Auth required - [facility] MEDICARE LPP[place] - [place] visits / 100% C[place]AGE / [place] /  12 follow up PT visits approved 05/12/2025-06/23/2025  Evaluation date: 5/12/25    Current Problem:   1. Low back pain  Follow Up In Physical Therapy        SUBJECTIVE:   Low back a little sore this morning, but overall feels better. Compliant with HEP, Feels her legs are getting stronger because she notices standing up from chair is getting easier     Precautions:   DEB Fall Risk: 3 (score of 4+ indicates fall risk)      Pain:   Start of session: 1/10       OBJECTIVE:    Able to complete sit to stand transfer from standard chair without use of UE assist     Treatments:  Therapeutic Exercise: (40 minutes)  NuStep (seat 7, UE 7) L3 x5 minutes   Standing with chair in from ,mat behind her:   B heel raises x15 with B UE support of chair   B toes raises x15 with B UE support of chair   reciprocal arm raise off chair back x 12   Standing facing mat:  Hip extension with forward lean to mat 1 x 10 L/R   Sidestepping with B UE support of mat x8 L/R  Backwards walking with unilateral UE support of mat +close supervision x8  6\" step taps with unilateral UE support of mat+ TrA brace x10 ea LE  Seated  rows (L2 resistance)2 x 15        HEP:  Access Code: [email or web address]  URL: [email or web address]  Date: 06/02/2025  Prepared by: Nikkie Hernandez    Exercises  - Supine Shoulder Flexion Extension AAROM with Dowel  - 1 x daily - 7 x weekly - 10 reps - 3 hold  - Supine Chin Tuck  - 1 x daily - 7 x weekly - 10 reps - 3 hold  - Seated Passive Cervical Retraction  - 1 x daily - 7 x weekly - 10 reps - 3 hold  - Standing Hip Flexor Stretch  - 1 x daily - 7 x weekly - " 3 sets - 30 hold    Access Code: [email or web address], URL: [email or web address]  Date: 05/27/2025, Prepared by: Nikkie Hernandez  - Sit to Stand  - 1 x daily - 7 x weekly - 2 sets - 10 reps    5/12/25  -seated bilateral shoulder ext rotation 2 x 10  -H/L bent knee fall out 5 sec hold 1 x 10 L/R     Access Code: [email or web address], URL: [email or web address]  Date: 05/12/2025, Prepared by: Nikkie Hernandez  - Neutral Posture Sitting  - 10 reps - 10 hold  - Neutral Posture in Standing  - 7 x weekly - 10 reps - 10 hold  - Seated Scapular Retraction  - 2 x daily - 7 x weekly - 10 reps - 5 hold  - Supine Hip Adduction Isometric with Ball  - 2 x daily - 7 x weekly - 10 reps - 5 hold  - Supine Posterior Pelvic Tilt  - 2 x daily - 7 x weekly - 10 reps - 5 hold  - Supine Quad Set  - 2 x daily - 7 x weekly - 10 reps - 5 hold  - Modified Jose Stretch  - 2 x daily - 7 x weekly - 3 sets - 30 hold    ASSESSMENT:   Pt gaining LE strength as she can now demonstrate sit to stand from chair without use of UE to assist .Continues to progress appropriately to goals.      Post session pain: denies    PLAN:  OP PT PLAN:  Treatment/Interventions: Cryotherapy , Education/Instruction , Electrical Stimulation , Manual Therapy  , Self care/home management , Therapeutic activities , and Therapeutic exercise    PT Plan: Skilled PT   PT Frequency: 2 times per week   Duration: 6 weeks  Insurance: 2025: EVAL ONLY - Auth required - [facility] MEDICARE LPPO - [place] visits / 100% COVERAGE / [place] VISITS /  [id number] / ds 5/11/25 //   Rehab Potential: Fair  Plan of Care Agreement: Patient    Goals:   1) Pt will decrease pain 50% from 0-8/10 to 0-4/10 for improved activity tolerance -PROGRESSING  2) Pt will improve sit to stand transfer to independent with UE support  -PROGRESSING  3) Pt will improve standing/walking tolerance from 5-10 minutes to >15 hr to improve ability to perform ADLs  -PROGRESSING  4) Pt will improve spinal extension to neutral in  standing to reduce lumbopelvic strain  -PROGRESSING  5) Pt will demonstrate compliance and independence with HEP for self management of condition at discharge  -PROGRESSING

## 2025-06-16 ENCOUNTER — TREATMENT (OUTPATIENT)
Dept: PHYSICAL THERAPY | Facility: CLINIC | Age: 89
End: 2025-06-16
Payer: MEDICARE

## 2025-06-16 DIAGNOSIS — M54.50 LOW BACK PAIN: ICD-10-CM

## 2025-06-16 PROCEDURE — 97110 THERAPEUTIC EXERCISES: CPT | Mod: GP | Performed by: PHYSICAL THERAPIST

## 2025-06-16 NOTE — PROGRESS NOTES
"Physical Therapy    Physical Therapy Treatment    Patient Name: Estrella Villafana  MRN: [id number]  Encounter Date: 6/16/2025     Time Calculation  Start Time: 1017  Stop Time: 1057  Time Calculation (min): 40 min    Visit #: 8  out of 13 (authorized until 06/23/2025)  Insurance:  2025: EVAL ONLY - Auth required - [facility] MEDICARE LPPO - [place] visits / 100% COVERAGE / [place] VISITS /  12 follow up PT visits approved 05/12/2025-06/23/2025  Evaluation date: 5/12/25    Current Problem:   1. Low back pain  Follow Up In Physical Therapy        SUBJECTIVE:   Feeling some pain in the L hip this morning, \"think its arthritis.\" No pain present when sitting, only with standing/moving around. Stayed home this weekend as she didn't feel up to going out; had a tougher week for some reason.     Precautions:   STEADI Fall Risk: 3 (score of 4+ indicates fall risk)      Pain:   Start of session: 0/10 at rest       OBJECTIVE:    Treatments:  Therapeutic Exercise: (40 minutes)  NuStep (seat 7, UE 7) L3 x7 minutes -- increased duration  Standing B calf stretch off edge of airex beam 3x20\"  Sit to stand to/from elevated mat, hands on thighs 2x10  Unilateral row (L2 resistance band) in staggered stance x15 ea UE    Standing against wall:    Dowel flexion x5 -- <90 degrees shoulder flexion achieved    B shoulder ER AROM x10    Alternating backwards stepping with B UE support x5 ea LE  Standing B heel raises 2x10   Standing B hip ABD+ EXT (diagonals) 2x10 ea LE  Standing marching with B UE support x10 ea LE    HEP:  Access Code: [email or web address]  URL: [email or web address]  Date: 06/02/2025  Prepared by: Nikkie Hernandez    Exercises  - Supine Shoulder Flexion Extension AAROM with Dowel  - 1 x daily - 7 x weekly - 10 reps - 3 hold  - Supine Chin Tuck  - 1 x daily - 7 x weekly - 10 reps - 3 hold  - Seated Passive Cervical Retraction  - 1 x daily - 7 x weekly - 10 reps - 3 hold  - Standing Hip Flexor Stretch  - 1 x daily - 7 x weekly - 3 sets - 30 " hold    Access Code: [email or web address], URL: [email or web address]/  Date: 05/27/2025, Prepared by: Nikkie Hernandez  - Sit to Stand  - 1 x daily - 7 x weekly - 2 sets - 10 reps    5/12/25  -seated bilateral shoulder ext rotation 2 x 10  -H/L bent knee fall out 5 sec hold 1 x 10 L/R     Access Code: [email or web address], URL: [email or web address]/  Date: 05/12/2025, Prepared by: Nikkie Hernandez  - Neutral Posture Sitting  - 10 reps - 10 hold  - Neutral Posture in Standing  - 7 x weekly - 10 reps - 10 hold  - Seated Scapular Retraction  - 2 x daily - 7 x weekly - 10 reps - 5 hold  - Supine Hip Adduction Isometric with Ball  - 2 x daily - 7 x weekly - 10 reps - 5 hold  - Supine Posterior Pelvic Tilt  - 2 x daily - 7 x weekly - 10 reps - 5 hold  - Supine Quad Set  - 2 x daily - 7 x weekly - 10 reps - 5 hold  - Modified Jose Stretch  - 2 x daily - 7 x weekly - 3 sets - 30 hold    ASSESSMENT:   Pt able to participate in all of today's interventions without complaint of hip pain provocation, however with limited endurance requiring more frequent sitting rest breaks between sets and exercises compared to previous sessions. Able to increase duration on seated NuStep this date in working towards building upon endurance. Remains flexed at the hips/knees/trunk in standing requiring constant cues; able to correct trunk posture but unable to maintain for any significant duration     Post session pain: denies    PLAN:  Objective measures next session with update of progress towards goals to submit for additional authorization    OP PT PLAN:  Treatment/Interventions: Cryotherapy , Education/Instruction , Electrical Stimulation , Manual Therapy  , Self care/home management , Therapeutic activities , and Therapeutic exercise    PT Plan: Skilled PT   PT Frequency: 2 times per week   Duration: 6 weeks  Insurance: 2025: EVAL ONLY - Auth required - [facility] MEDICARE LPPO - [place] visits / 100% COVERAGE / [place] VISITS /  [id number] / ds  5/11/25 //   Rehab Potential: Fair  Plan of Care Agreement: Patient    Goals:   1) Pt will decrease pain 50% from 0-8/10 to 0-4/10 for improved activity tolerance -PROGRESSING  2) Pt will improve sit to stand transfer to independent with UE support  -PROGRESSING  3) Pt will improve standing/walking tolerance from 5-10 minutes to >15 minutes to improve ability to perform ADLs  -PROGRESSING  4) Pt will improve spinal extension to neutral in standing to reduce lumbopelvic strain  -PROGRESSING  5) Pt will demonstrate compliance and independence with HEP for self management of condition at discharge  -PROGRESSING

## 2025-06-19 ENCOUNTER — TREATMENT (OUTPATIENT)
Dept: PHYSICAL THERAPY | Facility: CLINIC | Age: 89
End: 2025-06-19
Payer: MEDICARE

## 2025-06-19 DIAGNOSIS — M54.50 LOW BACK PAIN: ICD-10-CM

## 2025-06-19 PROCEDURE — 97110 THERAPEUTIC EXERCISES: CPT | Mod: GP,CQ

## 2025-07-10 ENCOUNTER — OFFICE VISIT (OUTPATIENT)
Facility: CLINIC | Age: 89
End: 2025-07-10
Payer: MEDICARE

## 2025-07-10 VITALS
BODY MASS INDEX: 28.34 KG/M2 | SYSTOLIC BLOOD PRESSURE: 126 MMHG | RESPIRATION RATE: 16 BRPM | WEIGHT: 166 LBS | HEIGHT: 64 IN | DIASTOLIC BLOOD PRESSURE: 70 MMHG

## 2025-07-10 DIAGNOSIS — G89.29 OTHER CHRONIC PAIN: ICD-10-CM

## 2025-07-10 DIAGNOSIS — Z79.899 ENCOUNTER FOR DRUG THERAPY: Primary | ICD-10-CM

## 2025-07-10 DIAGNOSIS — M48.062 SPINAL STENOSIS, LUMBAR REGION WITH NEUROGENIC CLAUDICATION: ICD-10-CM

## 2025-07-10 DIAGNOSIS — M51.369 DDD (DEGENERATIVE DISC DISEASE), LUMBAR: ICD-10-CM

## 2025-07-10 DIAGNOSIS — M54.16 LUMBAR RADICULOPATHY: ICD-10-CM

## 2025-07-10 PROCEDURE — 1125F AMNT PAIN NOTED PAIN PRSNT: CPT | Performed by: ANESTHESIOLOGY

## 2025-07-10 PROCEDURE — 1159F MED LIST DOCD IN RCRD: CPT | Performed by: ANESTHESIOLOGY

## 2025-07-10 PROCEDURE — G2211 COMPLEX E/M VISIT ADD ON: HCPCS | Performed by: ANESTHESIOLOGY

## 2025-07-10 PROCEDURE — 99214 OFFICE O/P EST MOD 30 MIN: CPT | Performed by: ANESTHESIOLOGY

## 2025-07-10 PROCEDURE — 1160F RVW MEDS BY RX/DR IN RCRD: CPT | Performed by: ANESTHESIOLOGY

## 2025-07-10 PROCEDURE — 3078F DIAST BP <80 MM HG: CPT | Performed by: ANESTHESIOLOGY

## 2025-07-10 PROCEDURE — 1036F TOBACCO NON-USER: CPT | Performed by: ANESTHESIOLOGY

## 2025-07-10 PROCEDURE — 3074F SYST BP LT 130 MM HG: CPT | Performed by: ANESTHESIOLOGY

## 2025-07-10 RX ORDER — TRAMADOL HYDROCHLORIDE 50 MG/1
50 TABLET, FILM COATED ORAL 2 TIMES DAILY PRN
Qty: 60 TABLET | Refills: 2 | Status: SHIPPED | OUTPATIENT
Start: 2025-07-24 | End: 2025-08-23

## 2025-07-10 RX ORDER — BACLOFEN 10 MG/1
10 TABLET ORAL DAILY PRN
Qty: 90 TABLET | Refills: 0 | Status: SHIPPED | OUTPATIENT
Start: 2025-07-10 | End: 2025-10-08

## 2025-07-10 ASSESSMENT — ENCOUNTER SYMPTOMS
MYALGIAS: 1
PSYCHIATRIC NEGATIVE: 1
ALLERGIC/IMMUNOLOGIC NEGATIVE: 1
ARTHRALGIAS: 1
ENDOCRINE NEGATIVE: 1
RESPIRATORY NEGATIVE: 1
HEMATOLOGIC/LYMPHATIC NEGATIVE: 1
BACK PAIN: 1
EYES NEGATIVE: 1
WEAKNESS: 1
CONSTITUTIONAL NEGATIVE: 1
CARDIOVASCULAR NEGATIVE: 1
GASTROINTESTINAL NEGATIVE: 1

## 2025-07-10 ASSESSMENT — PAIN SCALES - GENERAL
PAINLEVEL_OUTOF10: 7
PAINLEVEL_OUTOF10: 7

## 2025-07-10 ASSESSMENT — PAIN - FUNCTIONAL ASSESSMENT: PAIN_FUNCTIONAL_ASSESSMENT: 0-10

## 2025-07-10 ASSESSMENT — PAIN DESCRIPTION - DESCRIPTORS: DESCRIPTORS: ACHING

## 2025-07-10 NOTE — PROGRESS NOTES
MEDICATION NAME: tramadol  STRENGTH: 50mg  LAST FILL DATE: 25  DATE LAST TAKEN: 25  QUANTITY FILLED: 60  QUANTITY REMAININ  COUNT COMPLETED BY: KARINA KAUFMAN LPN and BRIGHT DAMON RN      UDS COMPLETED2025  CONTROLLED SUBSTANCES AGREEMENT SIGNED2024  ORT [id number]  Modified Oswestry disability form filled out annually.

## 2025-07-10 NOTE — PROGRESS NOTES
Subjective   Patient ID: Estrella Villafana is a 89 y.o. female who presents for Back Pain.  Back Pain  Associated symptoms include weakness.     Patient here today for follow-up and management of her chronic low back and leg pain secondary to spinal stenosis.  Patient reports that she has been well-managed on her tramadol which she takes 2 times per day.  It allows her to sleep through the night very comfortably.  She also takes baclofen in the evening time which helps the muscles in her back relax.  She reports that one of her biggest complaints is the fatigue and slowing of her gait when she is trying to walk for any extended time.  She understands that this is for spinal stenosis.  She stable at this time.  She reports no new numbness, tingling, weakness that has not been discussed in the past.  She is reporting no constipation issues with her medications.  She is reporting no adverse side effects.  She does live independently and can complete ADLs.    Pain Score:   7     Review of Systems   Constitutional: Negative.    HENT: Negative.     Eyes: Negative.    Respiratory: Negative.     Cardiovascular: Negative.    Gastrointestinal: Negative.    Endocrine: Negative.    Genitourinary: Negative.    Musculoskeletal:  Positive for arthralgias, back pain and myalgias.   Skin: Negative.    Allergic/Immunologic: Negative.    Neurological:  Positive for weakness.   Hematological: Negative.    Psychiatric/Behavioral: Negative.         Objective   Physical Exam  Vitals and nursing note reviewed.   Constitutional:       Appearance: Normal appearance.   HENT:      Head: Normocephalic and atraumatic.      Right Ear: Ear canal and external ear normal.      Left Ear: Ear canal and external ear normal.      Nose: Nose normal.      Mouth/Throat:      Mouth: Mucous membranes are moist.      Pharynx: Oropharynx is clear.   Eyes:      Conjunctiva/sclera: Conjunctivae normal.      Pupils: Pupils are equal, round, and reactive to light.    Cardiovascular:      Rate and Rhythm: Normal rate.   Pulmonary:      Effort: Pulmonary effort is normal. No respiratory distress.   Musculoskeletal:      Cervical back: Normal range of motion and neck supple.      Lumbar back: Deformity (kyphotic) and tenderness present. Decreased range of motion. Negative left straight leg raise test. No scoliosis.      Right hip: Crepitus present. Decreased range of motion.   Skin:     General: Skin is warm and dry.   Neurological:      Mental Status: She is alert.      Motor: Motor function is intact.      Coordination: Coordination is intact.      Gait: Gait abnormal (lumbar flexed).   Psychiatric:         Mood and Affect: Mood normal.         Thought Content: Thought content normal.         Assessment/Plan   Problem List Items Addressed This Visit           ICD-10-CM    DDD (degenerative disc disease), lumbar M51.369    Relevant Medications    baclofen (Lioresal) 10 mg tablet    traMADol (Ultram) 50 mg tablet (Start on 7/24/2025)    Other chronic pain G89.29    Relevant Medications    baclofen (Lioresal) 10 mg tablet    traMADol (Ultram) 50 mg tablet (Start on 7/24/2025)    Spinal stenosis, lumbar region with neurogenic claudication M48.062    Relevant Medications    baclofen (Lioresal) 10 mg tablet    traMADol (Ultram) 50 mg tablet (Start on 7/24/2025)     Other Visit Diagnoses         Codes      Encounter for drug therapy    -  Primary Z79.899    Relevant Orders    [facility] MISCELLANEOUS TEST (ROOM TEMPERATURE)      Lumbar radiculopathy     M54.16    Relevant Medications    baclofen (Lioresal) 10 mg tablet    traMADol (Ultram) 50 mg tablet (Start on 7/24/2025)          I nice discussion with the patient today our plan will be as follows.    Radiology: All available imaging was reviewed today.    Physically: Patient should continue home exercise program.    Psychologically: No issues at this time.    Medication: I will refill the patient's opioids today for 3 month.  The  patient continues to see benefit and improvement in their quality of life and ability to maintain ADLs.  Patient educated about the risks of taking opioids and operating a motor vehicle.  Patient reports no adverse side effects to current medication regimen.  Current regimen does allow patient to maintain ADLs.  Patient reports no new neurologic symptoms, new pain areas, or exacerbation in pain today.  Patient reports they are happy with current treatment care path.    [place] was reviewed and was consistent with the history.    Patient has been educated on the risks, benefits, and alternatives of controlled substances as well as the proper way to store these medications.  The patient and I discussed the nature of this medication and its side effects.  We discussed tolerance, physical dependence, psychological dependence, addiction and opioid-induced hyperalgesia.  We discussed the potential need to wean from this medication.  We discussed the availability of programs that can help with this process if necessary.  We discussed safety issues related to opioids including safe storage.  We discussed the fact that the patient should not drive an automobile or operate heavy machinery while taking this medication.  A prescription for naloxone was offered to the patient.  The patient will be re-evaluated for the need to continue opioid therapy in 60-90 days.  A urine or saliva specimen was obtained for toxicology screening  A opioid agreement and care plan was presented to the patient today.  The patient had the opportunity to read through the agreement during the office visit and has signed the agreement today.  A copy of the agreement was given to the patient to take home for their records.    Baclofen refilled for 90 days.    Duration: Greater than 1 year.    Intervention: No intervention at this time patient is stable.          Please note that this report has been produced using speech recognition software. It may  contain errors related to grammar, punctuation or spelling. Electronically signed, but not reviewed.          Star Ann MD 07/10/25 11:08 AM

## 2025-07-14 LAB — QUEST FLEXITEST1 RESULTS:: NORMAL

## 2025-07-22 ENCOUNTER — TREATMENT (OUTPATIENT)
Dept: PHYSICAL THERAPY | Facility: CLINIC | Age: 89
End: 2025-07-22
Payer: MEDICARE

## 2025-07-22 DIAGNOSIS — M54.50 LOW BACK PAIN: ICD-10-CM

## 2025-07-22 PROCEDURE — 97110 THERAPEUTIC EXERCISES: CPT | Mod: GP,CQ

## 2025-07-22 NOTE — PROGRESS NOTES
"Physical Therapy    Physical Therapy Treatment    Patient Name: Estrella Villafana  MRN: [id number]  Encounter Date: 7/22/2025     Time Calculation  Start Time: 1320  Stop Time: 1400  Time Calculation (min): 40 min    Visit #: 10  out of 13 (authorized until 8/6/25)  Insurance:  2025: EVAL ONLY (visit 1)- Auth required - [facility] MEDICARE LPPO - [place] visits / 100% COVERAGE / [place] VISITS /  12 follow up PT visits approved 05/12/2025-06/23/2025 (visits 2-9); 2nd Auth Rcvd 4 visits 7/9-8/6 for OP PT  (visits 10-13)  Evaluation date: 5/12/25    Current Problem:   1. Low back pain  Follow Up In Physical Therapy        SUBJECTIVE:   Pt felt good after last session\"it was a good workout\"  For the past week she has had high sx level into low back and right hip and feels it is weather related .  Using her cane to ambulate around house secondary to the higher sx level. Feels she fatigues quickly when walking, feels she can walk 2-3 min then needs to sit and rest Rates at 8/10 today     Precautions:   STEADI Fall Risk: 3 (score of 4+ indicates fall risk)      Pain:   Start of session: 0/10 at rest  Sx level can fluctuate  btw 0 to 3-4 and feels hot humid weather effects level       OBJECTIVE:    SPO@ 97% or higher , heart rate range 54-59 BPM   Previous   Continues to ambulate with mod flexed trunk in gait with straight cane, can improve to min flexed trunk with effort  Treatments:  Therapeutic Exercise: (40 minutes)  NuStep (seat 7, UE 7) L3 x7 minutes  Standing runner stretch 15 sec x 3 L/R at counter top   Side stepping at counter top 10 feet x 6  Standing B heel raises 2x10 at counter top (mild SOB)  Standing B hip ABD+ EXT (diagonals) 2x10 ea LE at counter top  Standing reciprocal toe taps to 6 inch step 10 x 3 rounds (mild SOB) standing trunk extension to neutral holding bar 2 x 10  Sit to stand from chair with hand push off on thighs 3 x 5 (mild SOB)  Seated rest breaks taken after each standing exercise  Seated   hip isometric  " adduction (pillow btw knees)2 x 10  Dowel bilateral shoulder elevation 10 x 2  Dowel shoulder press 2 x 10    HEP:  Access Code: [email or web address]  URL: [email or web address]  Date: 06/02/2025  Prepared by: Nikkie Hernandez    Exercises  - Supine Shoulder Flexion Extension AAROM with Dowel  - 1 x daily - 7 x weekly - 10 reps - 3 hold  - Supine Chin Tuck  - 1 x daily - 7 x weekly - 10 reps - 3 hold  - Seated Passive Cervical Retraction  - 1 x daily - 7 x weekly - 10 reps - 3 hold  - Standing Hip Flexor Stretch  - 1 x daily - 7 x weekly - 3 sets - 30 hold    Access Code: [email or web address], URL: [email or web address]  Date: 05/27/2025, Prepared by: Nikkie Hernandez  - Sit to Stand  - 1 x daily - 7 x weekly - 2 sets - 10 reps    5/12/25  -seated bilateral shoulder ext rotation 2 x 10  -H/L bent knee fall out 5 sec hold 1 x 10 L/R     Access Code: [email or web address], URL: [email or web address]  Date: 05/12/2025, Prepared by: Nikkie Hernandez  - Neutral Posture Sitting  - 10 reps - 10 hold  - Neutral Posture in Standing  - 7 x weekly - 10 reps - 10 hold  - Seated Scapular Retraction  - 2 x daily - 7 x weekly - 10 reps - 5 hold  - Supine Hip Adduction Isometric with Ball  - 2 x daily - 7 x weekly - 10 reps - 5 hold  - Supine Posterior Pelvic Tilt  - 2 x daily - 7 x weekly - 10 reps - 5 hold  - Supine Quad Set  - 2 x daily - 7 x weekly - 10 reps - 5 hold  - Modified Jose Stretch  - 2 x daily - 7 x weekly - 3 sets - 30 hold    ASSESSMENT:   Pt demonstrated increased fatigue today compared to previous therapy sessions , needed to sit and rest between standing exercises, Mild SOB observed but quickly dissipated with short seated rest breaks monitoring SPO2 levels and heart rate.     Post session pain: denies    PLAN:  OP PT PLAN:  Treatment/Interventions: Cryotherapy , Education/Instruction , Electrical Stimulation , Manual Therapy  , Self care/home management , Therapeutic activities , and Therapeutic exercise    PT Plan:  Skilled PT   PT Frequency: 2 times per week   Duration: 6 weeks  Insurance: 2025: EVAL ONLY - Auth required - [facility] MEDICARE LPPO - [place] visits / 100% COVERAGE / [place] VISITS /  [id number] / ds 5/11/25 //   Rehab Potential: Fair  Plan of Care Agreement: Patient    Goals:   1) Pt will decrease pain 50% from 0-8/10 to 0-4/10 for improved activity tolerance- achieved  2) Pt will improve sit to stand transfer to independent with UE support - achieved  3) Pt will improve standing/walking tolerance from 5-10 minutes to >15 minutes to improve ability to perform ADLs  -PROGRESSING  4) Pt will improve spinal extension to neutral in standing to reduce lumbopelvic strain  -PROGRESSING  5) Pt will demonstrate compliance and independence with HEP for self management of condition at discharge  -achieved

## 2025-07-24 ENCOUNTER — APPOINTMENT (OUTPATIENT)
Dept: PRIMARY CARE | Facility: CLINIC | Age: 89
End: 2025-07-24
Payer: MEDICARE

## 2025-07-30 ENCOUNTER — APPOINTMENT (OUTPATIENT)
Dept: PHYSICAL THERAPY | Facility: CLINIC | Age: 89
End: 2025-07-30
Payer: MEDICARE

## 2025-08-04 ENCOUNTER — APPOINTMENT (OUTPATIENT)
Dept: RADIOLOGY | Facility: HOSPITAL | Age: 89
DRG: 229 | End: 2025-08-04
Payer: MEDICARE

## 2025-08-04 ENCOUNTER — HOSPITAL ENCOUNTER (INPATIENT)
Facility: HOSPITAL | Age: 89
LOS: 2 days | Discharge: HOME | DRG: 229 | End: 2025-08-06
Admitting: INTERNAL MEDICINE
Payer: MEDICARE

## 2025-08-04 ENCOUNTER — APPOINTMENT (OUTPATIENT)
Dept: CARDIOLOGY | Facility: HOSPITAL | Age: 89
DRG: 229 | End: 2025-08-04
Payer: MEDICARE

## 2025-08-04 ENCOUNTER — OFFICE VISIT (OUTPATIENT)
Dept: URGENT CARE | Age: 89
End: 2025-08-04
Payer: MEDICARE

## 2025-08-04 VITALS
HEART RATE: 59 BPM | TEMPERATURE: 98 F | RESPIRATION RATE: 20 BRPM | OXYGEN SATURATION: 96 % | SYSTOLIC BLOOD PRESSURE: 200 MMHG | DIASTOLIC BLOOD PRESSURE: 88 MMHG

## 2025-08-04 DIAGNOSIS — R00.1 SINUS BRADYCARDIA: ICD-10-CM

## 2025-08-04 DIAGNOSIS — H61.22 IMPACTED CERUMEN, LEFT EAR: Primary | ICD-10-CM

## 2025-08-04 DIAGNOSIS — R42 LIGHTHEADED: Primary | ICD-10-CM

## 2025-08-04 DIAGNOSIS — I51.7 CARDIOMEGALY: ICD-10-CM

## 2025-08-04 DIAGNOSIS — I44.1 AV BLOCK, MOBITZ 2: ICD-10-CM

## 2025-08-04 DIAGNOSIS — M06.4 INFLAMMATORY POLYARTHRITIS (MULTI): ICD-10-CM

## 2025-08-04 DIAGNOSIS — R53.1 WEAKNESS: ICD-10-CM

## 2025-08-04 DIAGNOSIS — R01.1 HEART MURMUR: ICD-10-CM

## 2025-08-04 DIAGNOSIS — R06.09 DYSPNEA ON EXERTION: ICD-10-CM

## 2025-08-04 DIAGNOSIS — L89.312 PRESSURE ULCER OF RIGHT BUTTOCK, STAGE 2 (MULTI): ICD-10-CM

## 2025-08-04 DIAGNOSIS — I10 ESSENTIAL HYPERTENSION: ICD-10-CM

## 2025-08-04 LAB
ALBUMIN SERPL BCP-MCNC: 3.8 G/DL (ref 3.4–5)
ALP SERPL-CCNC: 53 U/L (ref 33–136)
ALT SERPL W P-5'-P-CCNC: 19 U/L (ref 7–45)
ANION GAP SERPL CALCULATED.3IONS-SCNC: 12 MMOL/L (ref 10–20)
APPEARANCE UR: CLEAR
AST SERPL W P-5'-P-CCNC: 19 U/L (ref 9–39)
BASOPHILS # BLD AUTO: 0.04 X10*3/UL (ref 0–0.1)
BASOPHILS NFR BLD AUTO: 0.8 %
BILIRUB SERPL-MCNC: 0.5 MG/DL (ref 0–1.2)
BILIRUB UR STRIP.AUTO-MCNC: NEGATIVE MG/DL
BUN SERPL-MCNC: 23 MG/DL (ref 6–23)
CALCIUM SERPL-MCNC: 9 MG/DL (ref 8.6–10.3)
CARDIAC TROPONIN I PNL SERPL HS: 22 NG/L (ref 0–13)
CARDIAC TROPONIN I PNL SERPL HS: 24 NG/L (ref 0–13)
CHLORIDE SERPL-SCNC: 110 MMOL/L (ref 98–107)
CO2 SERPL-SCNC: 22 MMOL/L (ref 21–32)
COLOR UR: COLORLESS
CREAT SERPL-MCNC: 0.64 MG/DL (ref 0.5–1.05)
EGFRCR SERPLBLD CKD-EPI 2021: 86 ML/MIN/1.73M*2
EOSINOPHIL # BLD AUTO: 0.16 X10*3/UL (ref 0–0.4)
EOSINOPHIL NFR BLD AUTO: 3.2 %
ERYTHROCYTE [DISTWIDTH] IN BLOOD BY AUTOMATED COUNT: 14.3 % (ref 11.5–14.5)
GLUCOSE SERPL-MCNC: 113 MG/DL (ref 74–99)
GLUCOSE UR STRIP.AUTO-MCNC: NORMAL MG/DL
HCT VFR BLD AUTO: 35.2 % (ref 36–46)
HGB BLD-MCNC: 11.4 G/DL (ref 12–16)
IMM GRANULOCYTES # BLD AUTO: 0.01 X10*3/UL (ref 0–0.5)
IMM GRANULOCYTES NFR BLD AUTO: 0.2 % (ref 0–0.9)
KETONES UR STRIP.AUTO-MCNC: NEGATIVE MG/DL
LEUKOCYTE ESTERASE UR QL STRIP.AUTO: NEGATIVE
LYMPHOCYTES # BLD AUTO: 0.76 X10*3/UL (ref 0.8–3)
LYMPHOCYTES NFR BLD AUTO: 15.1 %
MAGNESIUM SERPL-MCNC: 1.88 MG/DL (ref 1.6–2.4)
MCH RBC QN AUTO: 32.5 PG (ref 26–34)
MCHC RBC AUTO-ENTMCNC: 32.4 G/DL (ref 32–36)
MCV RBC AUTO: 100 FL (ref 80–100)
MONOCYTES # BLD AUTO: 0.71 X10*3/UL (ref 0.05–0.8)
MONOCYTES NFR BLD AUTO: 14.1 %
MUCOUS THREADS #/AREA URNS AUTO: NORMAL /LPF
NEUTROPHILS # BLD AUTO: 3.35 X10*3/UL (ref 1.6–5.5)
NEUTROPHILS NFR BLD AUTO: 66.6 %
NITRITE UR QL STRIP.AUTO: NEGATIVE
NRBC BLD-RTO: 0 /100 WBCS (ref 0–0)
PH UR STRIP.AUTO: 6.5 [PH]
PLATELET # BLD AUTO: 144 X10*3/UL (ref 150–450)
POC FINGERSTICK BLOOD GLUCOSE: 173 MG/DL (ref 70–100)
POTASSIUM SERPL-SCNC: 3.9 MMOL/L (ref 3.5–5.3)
PROT SERPL-MCNC: 7 G/DL (ref 6.4–8.2)
PROT UR STRIP.AUTO-MCNC: NEGATIVE MG/DL
RBC # BLD AUTO: 3.51 X10*6/UL (ref 4–5.2)
RBC # UR STRIP.AUTO: ABNORMAL MG/DL
RBC #/AREA URNS AUTO: NORMAL /HPF
RBC MORPH BLD: NORMAL
SODIUM SERPL-SCNC: 140 MMOL/L (ref 136–145)
SP GR UR STRIP.AUTO: 1.01
UROBILINOGEN UR STRIP.AUTO-MCNC: NORMAL MG/DL
WBC # BLD AUTO: 5 X10*3/UL (ref 4.4–11.3)
WBC #/AREA URNS AUTO: NORMAL /HPF

## 2025-08-04 PROCEDURE — 99203 OFFICE O/P NEW LOW 30 MIN: CPT | Performed by: NURSE PRACTITIONER

## 2025-08-04 PROCEDURE — 84075 ASSAY ALKALINE PHOSPHATASE: CPT | Performed by: PHYSICIAN ASSISTANT

## 2025-08-04 PROCEDURE — 69209 REMOVE IMPACTED EAR WAX UNI: CPT | Performed by: NURSE PRACTITIONER

## 2025-08-04 PROCEDURE — 71046 X-RAY EXAM CHEST 2 VIEWS: CPT | Performed by: RADIOLOGY

## 2025-08-04 PROCEDURE — 1160F RVW MEDS BY RX/DR IN RCRD: CPT | Performed by: NURSE PRACTITIONER

## 2025-08-04 PROCEDURE — 36415 COLL VENOUS BLD VENIPUNCTURE: CPT | Performed by: PHYSICIAN ASSISTANT

## 2025-08-04 PROCEDURE — 99291 CRITICAL CARE FIRST HOUR: CPT | Performed by: PHYSICIAN ASSISTANT

## 2025-08-04 PROCEDURE — 71046 X-RAY EXAM CHEST 2 VIEWS: CPT

## 2025-08-04 PROCEDURE — 81001 URINALYSIS AUTO W/SCOPE: CPT | Performed by: PHYSICIAN ASSISTANT

## 2025-08-04 PROCEDURE — 93005 ELECTROCARDIOGRAM TRACING: CPT

## 2025-08-04 PROCEDURE — 1159F MED LIST DOCD IN RCRD: CPT | Performed by: NURSE PRACTITIONER

## 2025-08-04 PROCEDURE — 85025 COMPLETE CBC W/AUTO DIFF WBC: CPT | Performed by: PHYSICIAN ASSISTANT

## 2025-08-04 PROCEDURE — 84484 ASSAY OF TROPONIN QUANT: CPT | Performed by: PHYSICIAN ASSISTANT

## 2025-08-04 PROCEDURE — 2500000001 HC RX 250 WO HCPCS SELF ADMINISTERED DRUGS (ALT 637 FOR MEDICARE OP): Performed by: INTERNAL MEDICINE

## 2025-08-04 PROCEDURE — 3077F SYST BP >= 140 MM HG: CPT | Performed by: NURSE PRACTITIONER

## 2025-08-04 PROCEDURE — 2500000001 HC RX 250 WO HCPCS SELF ADMINISTERED DRUGS (ALT 637 FOR MEDICARE OP)

## 2025-08-04 PROCEDURE — 99291 CRITICAL CARE FIRST HOUR: CPT

## 2025-08-04 PROCEDURE — 83735 ASSAY OF MAGNESIUM: CPT | Performed by: PHYSICIAN ASSISTANT

## 2025-08-04 PROCEDURE — 99221 1ST HOSP IP/OBS SF/LOW 40: CPT | Performed by: INTERNAL MEDICINE

## 2025-08-04 PROCEDURE — 1036F TOBACCO NON-USER: CPT | Performed by: NURSE PRACTITIONER

## 2025-08-04 PROCEDURE — 2500000004 HC RX 250 GENERAL PHARMACY W/ HCPCS (ALT 636 FOR OP/ED): Performed by: INTERNAL MEDICINE

## 2025-08-04 PROCEDURE — 99223 1ST HOSP IP/OBS HIGH 75: CPT | Performed by: INTERNAL MEDICINE

## 2025-08-04 PROCEDURE — 82962 GLUCOSE BLOOD TEST: CPT | Performed by: NURSE PRACTITIONER

## 2025-08-04 PROCEDURE — 3079F DIAST BP 80-89 MM HG: CPT | Performed by: NURSE PRACTITIONER

## 2025-08-04 PROCEDURE — 2060000001 HC INTERMEDIATE ICU ROOM DAILY

## 2025-08-04 RX ORDER — ACETAMINOPHEN 160 MG/5ML
650 SOLUTION ORAL EVERY 4 HOURS PRN
Status: DISCONTINUED | OUTPATIENT
Start: 2025-08-04 | End: 2025-08-06 | Stop reason: HOSPADM

## 2025-08-04 RX ORDER — CALCIUM CARBONATE 500(1250)
1250 TABLET ORAL DAILY
Status: DISCONTINUED | OUTPATIENT
Start: 2025-08-05 | End: 2025-08-06 | Stop reason: HOSPADM

## 2025-08-04 RX ORDER — AMLODIPINE BESYLATE 10 MG/1
10 TABLET ORAL ONCE
Status: COMPLETED | OUTPATIENT
Start: 2025-08-04 | End: 2025-08-04

## 2025-08-04 RX ORDER — ACETAMINOPHEN 325 MG/1
650 TABLET ORAL EVERY 4 HOURS PRN
Status: DISCONTINUED | OUTPATIENT
Start: 2025-08-04 | End: 2025-08-06 | Stop reason: HOSPADM

## 2025-08-04 RX ORDER — HYDRALAZINE HYDROCHLORIDE 20 MG/ML
5 INJECTION INTRAMUSCULAR; INTRAVENOUS EVERY 4 HOURS PRN
Status: DISCONTINUED | OUTPATIENT
Start: 2025-08-04 | End: 2025-08-06 | Stop reason: HOSPADM

## 2025-08-04 RX ORDER — CETIRIZINE HYDROCHLORIDE 10 MG/1
10 TABLET ORAL DAILY
Status: DISCONTINUED | OUTPATIENT
Start: 2025-08-05 | End: 2025-08-06 | Stop reason: HOSPADM

## 2025-08-04 RX ORDER — POLYETHYLENE GLYCOL 3350 17 G/17G
17 POWDER, FOR SOLUTION ORAL DAILY PRN
Status: DISCONTINUED | OUTPATIENT
Start: 2025-08-04 | End: 2025-08-06 | Stop reason: HOSPADM

## 2025-08-04 RX ORDER — EZETIMIBE 10 MG/1
10 TABLET ORAL DAILY
Status: DISCONTINUED | OUTPATIENT
Start: 2025-08-05 | End: 2025-08-06 | Stop reason: HOSPADM

## 2025-08-04 RX ORDER — LORATADINE 10 MG/1
10 TABLET ORAL DAILY
COMMUNITY

## 2025-08-04 RX ORDER — ENOXAPARIN SODIUM 100 MG/ML
40 INJECTION SUBCUTANEOUS EVERY 24 HOURS
Status: DISCONTINUED | OUTPATIENT
Start: 2025-08-04 | End: 2025-08-06 | Stop reason: HOSPADM

## 2025-08-04 RX ORDER — ONDANSETRON HYDROCHLORIDE 2 MG/ML
4 INJECTION, SOLUTION INTRAVENOUS EVERY 8 HOURS PRN
Status: DISCONTINUED | OUTPATIENT
Start: 2025-08-04 | End: 2025-08-06 | Stop reason: HOSPADM

## 2025-08-04 RX ORDER — BACLOFEN 10 MG/1
10 TABLET ORAL DAILY PRN
Status: DISCONTINUED | OUTPATIENT
Start: 2025-08-04 | End: 2025-08-06 | Stop reason: HOSPADM

## 2025-08-04 RX ORDER — CHOLECALCIFEROL (VITAMIN D3) 50 MCG
50 TABLET ORAL DAILY
Status: DISCONTINUED | OUTPATIENT
Start: 2025-08-05 | End: 2025-08-06 | Stop reason: HOSPADM

## 2025-08-04 RX ORDER — IBUPROFEN 600 MG/1
600 TABLET, FILM COATED ORAL EVERY MORNING
Status: DISCONTINUED | OUTPATIENT
Start: 2025-08-05 | End: 2025-08-06 | Stop reason: HOSPADM

## 2025-08-04 RX ORDER — AMLODIPINE BESYLATE 5 MG/1
5 TABLET ORAL DAILY
Status: DISCONTINUED | OUTPATIENT
Start: 2025-08-05 | End: 2025-08-06

## 2025-08-04 RX ORDER — ONDANSETRON 4 MG/1
4 TABLET, FILM COATED ORAL EVERY 8 HOURS PRN
Status: DISCONTINUED | OUTPATIENT
Start: 2025-08-04 | End: 2025-08-06 | Stop reason: HOSPADM

## 2025-08-04 RX ORDER — LOSARTAN POTASSIUM 100 MG/1
100 TABLET ORAL DAILY
Status: DISCONTINUED | OUTPATIENT
Start: 2025-08-04 | End: 2025-08-06 | Stop reason: HOSPADM

## 2025-08-04 RX ORDER — TALC
3 POWDER (GRAM) TOPICAL NIGHTLY PRN
Status: DISCONTINUED | OUTPATIENT
Start: 2025-08-04 | End: 2025-08-06 | Stop reason: HOSPADM

## 2025-08-04 RX ORDER — ACETAMINOPHEN 650 MG/1
650 SUPPOSITORY RECTAL EVERY 4 HOURS PRN
Status: DISCONTINUED | OUTPATIENT
Start: 2025-08-04 | End: 2025-08-06 | Stop reason: HOSPADM

## 2025-08-04 RX ORDER — TRAMADOL HYDROCHLORIDE 50 MG/1
50 TABLET, FILM COATED ORAL NIGHTLY
Status: DISCONTINUED | OUTPATIENT
Start: 2025-08-04 | End: 2025-08-06 | Stop reason: HOSPADM

## 2025-08-04 RX ADMIN — ACETAMINOPHEN 650 MG: 325 TABLET ORAL at 22:04

## 2025-08-04 RX ADMIN — AMLODIPINE BESYLATE 10 MG: 10 TABLET ORAL at 15:28

## 2025-08-04 RX ADMIN — ENOXAPARIN SODIUM 40 MG: 100 INJECTION SUBCUTANEOUS at 22:01

## 2025-08-04 RX ADMIN — BUSPIRONE HYDROCHLORIDE 15 MG: 15 TABLET ORAL at 22:01

## 2025-08-04 RX ADMIN — TRAMADOL HYDROCHLORIDE 50 MG: 50 TABLET, COATED ORAL at 22:01

## 2025-08-04 ASSESSMENT — PAIN SCALES - GENERAL: PAINLEVEL_OUTOF10: 0 - NO PAIN

## 2025-08-04 ASSESSMENT — ENCOUNTER SYMPTOMS: SINUS COMPLAINT: 1

## 2025-08-04 ASSESSMENT — PAIN - FUNCTIONAL ASSESSMENT: PAIN_FUNCTIONAL_ASSESSMENT: 0-10

## 2025-08-04 NOTE — ED TRIAGE NOTES
Pt states she was at urgent care for a sinus infection. States they did an EKG and was told to come to the ED. States she has been short of breath.

## 2025-08-04 NOTE — H&P
History Of Present Illness  Estrella Villafana is a 89 y.o. female presenting with 2 weeks duration.  She has a history of hypertension, taking amlodipine and losartan and no history of cardiac arrhythmias.  She was referred to the [facility] ED from an urgent care center where she had been seen earlier today.  She reports easy fatigability and dyspnea on exertion which she has been experiencing for the past 2 weeks.  She initially attributed her symptoms to the high ambient temperatures at the time.  She had no chest pain or chest tightness, palpitations, dizziness, fever or chills.  She was seen at the urgent care facility for sinus congestion.  She was found to be bradycardic with a heart rate of 45/min.  A twelve-lead EKG done in the ED showed Mobitz 2 second-degree block  She has been seen by [facility]s and pacemaker placement is planned.  Blood pressures in the ED were as high as 213/64.  Her heart rate was between 40 and 42/min.  She has received oral amlodipine.     Past Medical History  Medical History[1]    Surgical History  Surgical History[2]     Social History  She reports that she has never smoked. She has never used smokeless tobacco. She reports that she does not drink alcohol and does not use drugs.    Family History  Family History[3]     Allergies  Atorvastatin, Lactose, Dexamethasone, Lisinopril, Triamterene-hydrochlorothiazid, and Cephalexin    Review of Systems   General: Negative for fever,  chills or fatigue.    HEENT: Negative for headache, blurring of vision or double vision.    Cardiovascular: Negative for chest pain, palpitations or orthopnea.    Respiratory: Negative for cough, shortness of breath or wheezing.    Gastrointestinal: Negative for nausea, vomiting, hematemesis, abdominal pain or diarrhea.   Genitourinary: Negative for dysuria, hematuria, frequency or nocturia.   Musculoskeletal: Negative for joint pain, joint swelling or deformity.   Skin: Negative for rash,  "itching, or jaundice.   Hematologic: Negative for bleeding or bruising.   Neurologic: Negative for headache, loss of consciousness. syncope or seizures   Psychological: Negative for anxiety, hallucinations or depression.     Physical Exam   General.: Awake alert well-developed, responsive   HEENT: Normocephalic, not icteric, not pale, no facial asymmetry, no pharyngeal erythema.   Neck: Supple, no carotid bruit, no thyroid enlargement.   Cardiovascular: Regular heart rate and rhythm normal S1 and S2.   Respiratory: Equal breath sounds bilaterally clear to auscultation.   Abdomen: Soft, nontender to palpation, bowel sounds present and normoactive.   Extremities: No peripheral cyanosis, no pedal edema.   Neurologic: Alert and oriented to self, place and date, muscle strength 5/5 in all extremities.   Dermatologic: No rash, ecchymosis, or jaundice.   Psychological: Appropriate affect and behavior.       Last Recorded Vitals  Blood pressure (!) 213/64, pulse (!) 40, temperature 36.4 °C (97.5 °F), temperature source Temporal, resp. rate 14, height 1.626 m (5' 4\"), weight 71.7 kg (158 lb), SpO2 94%.    Relevant Results  Results for orders placed or performed during the hospital encounter of 08/04/25 (from the past 24 hours)   CBC and Auto Differential   Result Value Ref Range    WBC 5.0 4.4 - 11.3 x10*3/uL    nRBC 0.0 0.0 - 0.0 /100 WBCs    RBC 3.51 (L) 4.00 - 5.20 x10*6/uL    Hemoglobin 11.4 (L) 12.0 - 16.0 g/dL    Hematocrit 35.2 (L) 36.0 - 46.0 %     80 - 100 fL    MCH 32.5 26.0 - 34.0 pg    MCHC 32.4 32.0 - 36.0 g/dL    RDW 14.3 11.5 - 14.5 %    Platelets 144 (L) 150 - 450 x10*3/uL    Neutrophils % 66.6 40.0 - 80.0 %    Immature Granulocytes %, Automated 0.2 0.0 - 0.9 %    Lymphocytes % 15.1 13.0 - 44.0 %    Monocytes % 14.1 2.0 - 10.0 %    Eosinophils % 3.2 0.0 - 6.0 %    Basophils % 0.8 0.0 - 2.0 %    Neutrophils Absolute 3.35 1.60 - 5.50 x10*3/uL    Immature Granulocytes Absolute, Automated 0.01 0.00 - 0.50 " x10*3/uL    Lymphocytes Absolute 0.76 (L) 0.80 - 3.00 x10*3/uL    Monocytes Absolute 0.71 0.05 - 0.80 x10*3/uL    Eosinophils Absolute 0.16 0.00 - 0.40 x10*3/uL    Basophils Absolute 0.04 0.00 - 0.10 x10*3/uL   Comprehensive metabolic panel   Result Value Ref Range    Glucose 113 (H) 74 - 99 mg/dL    Sodium 140 136 - 145 mmol/L    Potassium 3.9 3.5 - 5.3 mmol/L    Chloride 110 (H) 98 - 107 mmol/L    Bicarbonate 22 21 - 32 mmol/L    Anion Gap 12 10 - 20 mmol/L    Urea Nitrogen 23 6 - 23 mg/dL    Creatinine 0.64 0.50 - 1.05 mg/dL    eGFR 86 >60 mL/min/1.73m*2    Calcium 9.0 8.6 - 10.3 mg/dL    Albumin 3.8 3.4 - 5.0 g/dL    Alkaline Phosphatase 53 33 - 136 U/L    Total Protein 7.0 6.4 - 8.2 g/dL    AST 19 9 - 39 U/L    Bilirubin, Total 0.5 0.0 - 1.2 mg/dL    ALT 19 7 - 45 U/L   Magnesium   Result Value Ref Range    Magnesium 1.88 1.60 - 2.40 mg/dL   Troponin I, High Sensitivity, Initial   Result Value Ref Range    Troponin I, High Sensitivity 24 (H) 0 - 13 ng/L   Morphology   Result Value Ref Range    RBC Morphology No significant RBC morphology present    Troponin, High Sensitivity, 1 Hour   Result Value Ref Range    Troponin I, High Sensitivity 22 (H) 0 - 13 ng/L   Urinalysis with Reflex Culture and Microscopic   Result Value Ref Range    Color, Urine Colorless (N) Light-Yellow, Yellow, Dark-Yellow    Appearance, Urine Clear Clear    Specific Gravity, Urine 1.008 1.005 - 1.035    pH, Urine 6.5 5.0, 5.5, 6.0, 6.5, 7.0, 7.5, 8.0    Protein, Urine NEGATIVE NEGATIVE, 10 (TRACE), 20 (TRACE) mg/dL    Glucose, Urine Normal Normal mg/dL    Blood, Urine 0.06 (1+) (A) NEGATIVE mg/dL    Ketones, Urine NEGATIVE NEGATIVE mg/dL    Bilirubin, Urine NEGATIVE NEGATIVE mg/dL    Urobilinogen, Urine Normal Normal mg/dL    Nitrite, Urine NEGATIVE NEGATIVE    Leukocyte Esterase, Urine NEGATIVE NEGATIVE   Urinalysis Microscopic   Result Value Ref Range    WBC, Urine NONE 1-5, NONE /HPF    RBC, Urine 3-5 NONE, 1-2, 3-5 /HPF    Mucus, Urine  "FEW Reference range not established. /LPF     XR chest 2 views  Result Date: 8/4/2025  Interpreted By:  Mohinder Isaac, STUDY: XR CHEST 2 VIEWS;  8/4/2025 2:31 pm   INDICATION: Signs/Symptoms:near syncope.     COMPARISON: None.   ACCESSION NUMBER(S): [id number]   ORDERING CLINICIAN: INDER CARBONE   FINDINGS:         CARDIOMEDIASTINAL SILHOUETTE: There is mild enlargement of the cardiomediastinal silhouette. There is pulmonary vascular congestion.   LUNGS: Streaky bibasilar opacities present. No dense consolidation seen. No effusion seen.   ABDOMEN: No remarkable upper abdominal findings.   BONES: No acute osseous changes.       1. Enlarged cardiac silhouette with vascular congestion. 2. Bibasilar atelectatic changes.       MACRO: None   Signed by: Mohinder Isaac 8/4/2025 2:47 PM Dictation workstation:   [id number]      Assessment & Plan    Bradycardia with second-degree atrioventricular block  EP consult for possible pacemaker placement    Hypertensive urgency  Resume losartan and amlodipine  Increase losartan to 100 mg daily  IV hydralazine for as needed for elevated systolic BP     Hyperlipidemia  On zetia    Plan  Admit to SDU  Cardiac monitoring  CODE STATUS was discussed.  She is full code      Parris Mcneal MD         [1]   Past Medical History:  Diagnosis Date    Anxiety     Cellulitis of finger of left hand 09/15/2023    Hyperglycemia     Hyperlipidemia     Hypertension     Lactose intolerance     Lumbar spinal stenosis     Osteoarthritis     Sciatica    [2]   Past Surgical History:  Procedure Laterality Date    ADENOIDECTOMY      CHOLECYSTECTOMY  08/28/2014    ROBOTIC SINGLE PORT LAPAROSCOPIC    OTHER SURGICAL HISTORY      Spinal implant    SPINE SURGERY  08/23/2022    \"TUBE IN SPINE\"    TONSILLECTOMY      WISDOM TOOTH EXTRACTION     [3]   Family History  Problem Relation Name Age of Onset    Hypertension Mother      Stroke Mother      Stroke Father      Retinal detachment Other females  "

## 2025-08-04 NOTE — ED PROVIDER NOTES
HPI   Chief Complaint   Patient presents with    Abnormal ECG       89-year-old female presented emergency department with a chief complaint of lightheadedness.  Bradycardia.  Was at urgent care.  Found to be bradycardic.  Referred to the emergency department.  Denies anticoagulation.  Denies beta-blocker.  Denies chest pain, shortness of breath.  Denies injury or trauma.  Denies numbness weakness.  No other complaint.              Patient History   Medical History[1]  Surgical History[2]  Family History[3]  Social History[4]    Physical Exam   ED Triage Vitals [08/04/25 1333]   Temperature Heart Rate Respirations BP   36.4 °C (97.5 °F) (!) 46 20 (!) 216/74      Pulse Ox Temp Source Heart Rate Source Patient Position   96 % Temporal -- Sitting      BP Location FiO2 (%)     -- --       Physical Exam  Vitals and nursing note reviewed.   Constitutional:       Appearance: Normal appearance.   HENT:      Head: Normocephalic.      Nose: Nose normal.      Mouth/Throat:      Mouth: Mucous membranes are moist.     Cardiovascular:      Rate and Rhythm: Normal rate and regular rhythm.      Pulses: Normal pulses.      Heart sounds: Normal heart sounds.   Pulmonary:      Effort: Pulmonary effort is normal.      Breath sounds: Normal breath sounds.   Abdominal:      General: Abdomen is flat.     Musculoskeletal:         General: Normal range of motion.      Cervical back: Normal range of motion.     Skin:     General: Skin is warm.     Neurological:      General: No focal deficit present.      Mental Status: She is alert and oriented to person, place, and time.     Psychiatric:         Mood and Affect: Mood normal.         Behavior: Behavior normal.           ED Course & MDM   ED Course as of 08/04/25 1702   Mon Aug 04, 2025   1344 EKG interpreted by myself at 1344  2:1 block, mobitz type 2, RBBB  Cards contacted.  [SY]   1400 Consult with Dr. Arriaga from electrophysiology.  Will plan to do cardiac device [JH]      ED Course  User Index  [JH] Lee Mott PA-C  [SY] Damian Ruffin MD         Diagnoses as of 08/04/25 1702   Lightheaded   AV block, Mobitz 2                 No data recorded     Mireya Coma Scale Score: 15 (08/04/25 1336 : Jade Dawson, TING)                           Medical Decision Making  I have seen and evaluated this patient.  The attending physician has also seen and evaluated this patient.  Vital signs, laboratory testing and diagnostic images if applicable have been reviewed.  All laboratory and imaging is interpreted by myself unless otherwise stated.  Radiology studies are also formally interpreted by radiologist.     CBC without significant leukocytosis or anemia, metabolic panel without significant renal impairment or electrolyte abnormality.  Troponin within an appropriate range without significant delta change, chest x-ray without actionable cardiopulmonary process, EKG without evidence of acute ischemia.  EKG with evidence of Mobitz 2.  Electrophysiology consulted.  Recommends admission for cardiac device placement.  No other complaint.          Labs Reviewed   CBC WITH AUTO DIFFERENTIAL - Abnormal       Result Value    WBC 5.0      nRBC 0.0      RBC 3.51 (*)     Hemoglobin 11.4 (*)     Hematocrit 35.2 (*)           MCH 32.5      MCHC 32.4      RDW 14.3      Platelets 144 (*)     Neutrophils % 66.6      Immature Granulocytes %, Automated 0.2      Lymphocytes % 15.1      Monocytes % 14.1      Eosinophils % 3.2      Basophils % 0.8      Neutrophils Absolute 3.35      Immature Granulocytes Absolute, Automated 0.01      Lymphocytes Absolute 0.76 (*)     Monocytes Absolute 0.71      Eosinophils Absolute 0.16      Basophils Absolute 0.04     COMPREHENSIVE METABOLIC PANEL - Abnormal    Glucose 113 (*)     Sodium 140      Potassium 3.9      Chloride 110 (*)     Bicarbonate 22      Anion Gap 12      Urea Nitrogen 23      Creatinine 0.64      eGFR 86      Calcium 9.0      Albumin 3.8      Alkaline Phosphatase  53      Total Protein 7.0      AST 19      Bilirubin, Total 0.5      ALT 19     SERIAL TROPONIN-INITIAL - Abnormal    Troponin I, High Sensitivity 24 (*)     Narrative:     Less than 99th percentile of normal range cutoff-  Female and children under 18 years old <14 ng/L; Male <21 ng/L: Negative  Repeat testing should be performed if clinically indicated.     Female and children under 18 years old 14-50 ng/L; Male 21-50 ng/L:  Consistent with possible cardiac damage and possible increased clinical   risk. Serial measurements may help to assess extent of myocardial damage.     >50 ng/L: Consistent with cardiac damage, increased clinical risk and  myocardial infarction. Serial measurements may help assess extent of   myocardial damage.      NOTE: Children less than 1 year old may have higher baseline troponin   levels and results should be interpreted in conjunction with the overall   clinical context.     NOTE: Troponin I testing is performed using a different   testing methodology at [facility] than at other   [facility]. Direct result comparisons should only   be made within the same method.   SERIAL TROPONIN, 1 HOUR - Abnormal    Troponin I, High Sensitivity 22 (*)     Narrative:     Less than 99th percentile of normal range cutoff-  Female and children under 18 years old <14 ng/L; Male <21 ng/L: Negative  Repeat testing should be performed if clinically indicated.     Female and children under 18 years old 14-50 ng/L; Male 21-50 ng/L:  Consistent with possible cardiac damage and possible increased clinical   risk. Serial measurements may help to assess extent of myocardial damage.     >50 ng/L: Consistent with cardiac damage, increased clinical risk and  myocardial infarction. Serial measurements may help assess extent of   myocardial damage.      NOTE: Children less than 1 year old may have higher baseline troponin   levels and results should be interpreted in conjunction with the overall    clinical context.     NOTE: Troponin I testing is performed using a different   testing methodology at [facility] than at other   [facility]. Direct result comparisons should only   be made within the same method.   MAGNESIUM - Normal    Magnesium 1.88     TROPONIN SERIES- (INITIAL, 1 HR)    Narrative:     The following orders were created for panel order Troponin I Series, High Sensitivity (0, 1 HR).  Procedure                               Abnormality         Status                     ---------                               -----------         ------                     Troponin I, High Sensiti...[[id number]]  Abnormal            Final result               Troponin, High Sensitivi...[[id number]]  Abnormal            Final result                 Please view results for these tests on the individual orders.   URINALYSIS WITH REFLEX CULTURE AND MICROSCOPIC    Narrative:     The following orders were created for panel order Urinalysis with Reflex Culture and Microscopic.  Procedure                               Abnormality         Status                     ---------                               -----------         ------                     Urinalysis with Reflex C...[[id number]]                                                 Extra Urine Gray Tube[[id number]]                                                         Please view results for these tests on the individual orders.   URINALYSIS WITH REFLEX CULTURE AND MICROSCOPIC   EXTRA URINE GRAY TUBE   MORPHOLOGY    RBC Morphology No significant RBC morphology present       XR chest 2 views   Final Result   1. Enlarged cardiac silhouette with vascular congestion.   2. Bibasilar atelectatic changes.                  MACRO:   None        Signed by: Mohinder Isaac 8/4/2025 2:47 PM   Dictation workstation:   [id number]        Medications   amLODIPine (Norvasc) tablet 10 mg (10 mg oral Given 8/4/25 1528)     New Prescriptions    No medications on file  "        Procedure  Critical Care    Performed by: Lee Mott PA-C  Authorized by: Lee Mott PA-C    Critical care provider statement:     Critical care time (minutes):  31    Critical care time was exclusive of:  Separately billable procedures and treating other patients    Critical care was necessary to treat or prevent imminent or life-threatening deterioration of the following conditions:  Cardiac failure    Critical care was time spent personally by me on the following activities:  Development of treatment plan with patient or surrogate, discussions with consultants, evaluation of patient's response to treatment, examination of patient, obtaining history from patient or surrogate, ordering and performing treatments and interventions, re-evaluation of patient's condition, review of old charts, ordering and review of laboratory studies, ordering and review of radiographic studies and pulse oximetry    Care discussed with: admitting provider           [1]   Past Medical History:  Diagnosis Date    Anxiety     Cellulitis of finger of left hand 09/15/2023    Hyperglycemia     Hyperlipidemia     Hypertension     IBS (irritable bowel syndrome)     Lactose intolerance     Osteoarthritis     Sciatica     Spinal stenosis    [2]   Past Surgical History:  Procedure Laterality Date    ADENOIDECTOMY      CHOLECYSTECTOMY  08/28/2014    ROBOTIC SINGLE PORT LAPAROSCOPIC    OTHER SURGICAL HISTORY      Spinal implant    OTHER SURGICAL HISTORY      MOLAR EXTRACTION    SPINE SURGERY  08/23/2022    \"TUBE IN SPINE\"    TONSILLECTOMY     [3]   Family History  Problem Relation Name Age of Onset    Hypertension Mother      Stroke Mother      Stroke Father      Retinal detachment Other females    [4]   Social History  Tobacco Use    Smoking status: Never    Smokeless tobacco: Never   Vaping Use    Vaping status: Never Used   Substance Use Topics    Alcohol use: Never    Drug use: Never        Lee Mott PA-C  08/04/25 " [id number]

## 2025-08-04 NOTE — PROGRESS NOTES
Pharmacy Medication History Review    Estrella Villafana is a 89 y.o. female. Pharmacy reviewed the patient's [id number] medications and allergies for accuracy.    Medications ADDED:  Loratadine 10mg   Medications CHANGED:  None   Medications REMOVED:   Biotin 5mg   Fluocinonide 0.05% solution     The list below reflects the updated PTA list. Comments regarding how patient may be taking medications differently can be found in the Admit Orders Activity  Prior to Admission Medications   Prescriptions Last Dose Informant   acetaminophen (Tylenol) 325 mg tablet 8/3/2025 Evening Self   Sig: Take 2 tablets (650 mg) by mouth once daily at bedtime.   amLODIPine (Norvasc) 5 mg tablet 2025 Self   Sig: Take 1 tablet (5 mg) by mouth once daily.   baclofen (Lioresal) 10 mg tablet Past Week Self   Sig: Take 1 tablet (10 mg) by mouth once daily as needed for muscle spasms.   busPIRone (Buspar) 15 mg tablet 2025 Self   Sig: Take 1 tablet (15 mg) by mouth 2 times a day.   calcium carbonate (CALTRATE 600 ORAL) 8/3/2025 Self   Sig: Take 600 mg by mouth once daily.   cholecalciferol (Vitamin D3) 50 mcg (2,000 unit) capsule 2025 Self   Sig: Take 1 capsule (50 mcg) by mouth early in the morning..   ciclopirox 1 % shampoo Past Week Self   Si (one) time per week.   ezetimibe (Zetia) 10 mg tablet 2025 Self   Sig: Take 1 tablet (10 mg) by mouth once daily.   folic acid/multivit-min/lutein (CENTRUM SILVER ORAL) 8/3/2025 Self   Sig: Take 1 tablet by mouth once daily.   ibuprofen 200 mg tablet 2025 Morning Self   Sig: Take 3 tablets (600 mg) by mouth once daily in the morning.   loratadine (Claritin) 10 mg tablet 8/3/2025 Self   Sig: Take 1 tablet (10 mg) by mouth once daily.   losartan (Cozaar) 50 mg tablet 2025 Self   Sig: Take 1 tablet (50 mg) by mouth once daily.   traMADol (Ultram) 50 mg tablet 8/3/2025 Evening Self   Sig: Take 1 tablet (50 mg) by mouth 2 times a day as needed for severe pain (7 - 10). Do  not fill before July 24, 2025.   Patient taking differently: Take 1 tablet (50 mg) by mouth once daily at bedtime.   vitamins A,C,E-zinc-copper (PreserVision AREDS) 4,296 mcg-226 mg-90 mg capsule 8/4/2025 Self   Sig: Take 1 capsule by mouth 2 times a day.      Facility-Administered Medications: None        The list below reflects the updated allergy list. Please review each documented allergy for additional clarification and justification.  Allergies  Reviewed by Aleena Kulkarni CPhT on 8/4/2025        Severity Reactions Comments    Atorvastatin High Other, Myalgia Joint pain    Lactose High GI Upset     Dexamethasone Medium Myalgia     Lisinopril Not Specified Myalgia, Unknown     Triamterene-hydrochlorothiazid Not Specified Myalgia     Cephalexin Low Diarrhea             Pharmacy has been updated to [facility].    Sources used to complete the med history include dispense history, PTA medication list, patient interview. Patient is a good historian.    Below are additional concerns with the patient's PTA list.  none    Aleena Kulkarni CPhT-Adv  Please reach out via [employer] Secure Chat for questions

## 2025-08-04 NOTE — CONSULTS
"Inpatient consult to Cardiology  Consult performed by: Hans Arriaga MD  Consult ordered by: Lee Mott PA-C  Reason for consult: Atrioventricular block  Assessment/Recommendations: Echocardiogram  Likely pacemaker tomorrow.  N.p.o. after midnight.        History Of Present Illness:    Estrella Villafana is a 89 y.o. female with a history of hypertension who presents with predominant symptoms of dyspnea exertion and lightheadedness dizziness.  She has not had kirby syncope.  She was found to be in 2-1 atrioventricular block with ineffective rhythm at 40 bpm.  She has a right bundle branch block.  There is no history of coronary artery disease and she notes that she has never had an echocardiogram or a noninvasive stress test.  She is fairly active but noticed that she was becoming more dyspneic and lethargic over the past few days prior to her admission.  She does not take her blood pressure or heart rate at home on a regular basis.  She is compliant with her medications which do not include any AV freeman blocking agents.  She only takes amlodipine and losartan.     Last Recorded Vitals:  Vitals:    08/04/25 1333 08/04/25 1357 08/04/25 1430 08/04/25 1444   BP: (!) 216/74 (!) 198/60 (!) 214/54 (!) 209/64   BP Location:  Right arm  Right arm   Patient Position: Sitting Sitting  Lying   Pulse: (!) 46 (!) 40 (!) 40 (!) 40   Resp: 20 12  15   Temp: 36.4 °C (97.5 °F)      TempSrc: Temporal      SpO2: 96% (!) 92% (!) 92% (!) 93%   Weight: 71.7 kg (158 lb)      Height: 1.626 m (5' 4\")          Last Labs:  CBC - 8/4/2025:  1:50 PM  5.0 11.4 144    35.2      CMP - 8/4/2025:  1:50 PM  9.0 7.0 19 --- 0.5   _ 3.8 19 53      PTT - No results in last year.  _   _ _     Troponin I, High Sensitivity   Date/Time Value Ref Range Status   08/04/2025 01:50 PM 24 (H) 0 - 13 ng/L Final     POC HEMOGLOBIN A1c   Date/Time Value Ref Range Status   03/03/2025 11:26 AM 6.1 4.2 - 6.5 % Final   04/15/2024 11:12 AM 5.6 4.2 - 6.5 % Final " "    Hemoglobin A1C   Date/Time Value Ref Range Status   10/23/2024 11:28 AM 5.6 See comment % Final     LDL Calculated   Date/Time Value Ref Range Status   10/23/2024 11:28  (H) <=99 mg/dL Final     Comment:                                 Near   Borderline      AGE      Desirable  Optimal    High     High     Very High     0-19 Y     0 - 109     ---    110-129   >/= 130     ----    20-24 Y     0 - 119     ---    120-159   >/= 160     ----      >24 Y     0 -  99   100-129  130-159   160-189     >/=190     04/25/2024 09:49  65 - 130 mg/dL Final   08/11/2023 11:26  65 - 130 MG/DL Final     VLDL   Date/Time Value Ref Range Status   10/23/2024 11:28 AM 20 0 - 40 mg/dL Final      Last I/O:  No intake/output data recorded.    Past Cardiology Tests (Last 3 Years):  EKG:  ECG 12 Lead     Echo:  No results found for this or any previous visit from the past 1095 days.    Ejection Fractions:  No results found for: \"EF\"  Cath:  No results found for this or any previous visit from the past 1095 days.    Stress Test:  No results found for this or any previous visit from the past 1095 days.    Cardiac Imaging:  No results found for this or any previous visit from the past 1095 days.      Past Medical History:  She has a past medical history of Anxiety, Cellulitis of finger of left hand (09/15/2023), Hyperglycemia, Hyperlipidemia, Hypertension, IBS (irritable bowel syndrome), Lactose intolerance, Osteoarthritis, Sciatica, and Spinal stenosis.    Past Surgical History:  She has a past surgical history that includes Tonsillectomy; Adenoidectomy; Cholecystectomy (08/28/2014); Spine surgery (08/23/2022); Other surgical history; and Other surgical history.      Social History:  She reports that she has never smoked. She has never used smokeless tobacco. She reports that she does not drink alcohol and does not use drugs.    Family History:  Family History[1]     Allergies:  Atorvastatin, Lactose, Dexamethasone, " Lisinopril, Triamterene-hydrochlorothiazid, and Cephalexin    Inpatient Medications:  Scheduled Medications[2]  PRN Medications[3]  Continuous Medications[4]  Outpatient Medications:  Current Outpatient Medications   Medication Instructions    acetaminophen (TYLENOL) 650 mg, Nightly    amLODIPine (NORVASC) 5 mg, oral, Daily    baclofen (LIORESAL) 10 mg, oral, Daily PRN    busPIRone (BUSPAR) 15 mg, oral, 2 times daily    calcium carbonate (CALTRATE 600 ORAL) 600 mg, Daily    cholecalciferol (VITAMIN D3) 50 mcg, Daily    ciclopirox 1 % shampoo 1 (one) time per week.    ezetimibe (ZETIA) 10 mg, oral, Daily    folic acid/multivit-min/lutein (CENTRUM SILVER ORAL) 1 tablet, Daily    ibuprofen 600 mg, Every morning    loratadine (CLARITIN) 10 mg, Daily    losartan (COZAAR) 50 mg, oral, Daily    traMADol (ULTRAM) 50 mg, oral, 2 times daily PRN    vitamins A,C,E-zinc-copper (PreserVision AREDS) 4,296 mcg-226 mg-90 mg capsule 1 capsule, 2 times daily       Physical Exam:  General: Alert, oriented, obese  HEENT: Normocephalic, eyes normal ears normal  Heart: Normal S1, S2 , rhythm regular bradycardic, no rubs or gallops   Respiratory: Lungs clear to auscultation bilaterally, no rales, crackles, wheezes or rhonchi  Abdomen: Bowel sounds present in all quadrants  Extremities: No upper or lower extremity edema appreciated  Dermatology: Skin Normal  Genitourinary: Deferred  Neurological: Nonfocal, moves all extremities  Psychology :Appropriate affect and mood      Assessment/Plan   Atrioventricular block  The patient has 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  Her blood pressure is stable and actually hypertensive at this time.  I would like to recommend an echocardiogram with anticipation of a dual-chamber pacemaker implant tomorrow.  Please keep n.p.o. after midnight.  Increase losartan dosing.  Peripheral IV 08/04/25 20 G Left Antecubital (Active)   Site  Assessment Clean;Dry;Intact 08/04/25 1351   Dressing Type Transparent 08/04/25 1351   Line Status Flushed 08/04/25 1351   Dressing Status Clean;Dry;Occlusive 08/04/25 1351   Number of days: 0       Code Status:  No Order    I spent 45 minutes in the professional and overall care of this patient.        Hans Arriaga MD       [1]   Family History  Problem Relation Name Age of Onset    Hypertension Mother      Stroke Mother      Stroke Father      Retinal detachment Other females    [2]   Scheduled medications   Medication Dose Route Frequency    amLODIPine  10 mg oral Once   [3]   PRN medications   Medication   [4]   Continuous Medications   Medication Dose Last Rate

## 2025-08-04 NOTE — PROGRESS NOTES
Subjective   Patient ID: Estrella Villafana is a 89 y.o. female. They present today with a chief complaint of Sinus Problem (Couple weeks. Watery eyes and pressure).    History of Present Illness  89-year-old female presents today with sinus congestion.  She endorses weakness times last 2 weeks.  She has a long history of myalgia and she is in pain management.  She denies chest pain.  She denies abdominal pain.  She denies becoming diaphoretic.  She denies any recent trauma or.  She denies epistaxis.  She endorses cerumen impaction and decrease in hearing in the left ear.      History provided by:  Patient and relative   used: No    Sinus Problem      Past Medical History  Allergies as of 08/04/2025 - Reviewed 08/04/2025   Allergen Reaction Noted    Atorvastatin Other and Myalgia 09/15/2023    Lactose GI Upset 09/15/2023    Dexamethasone Myalgia 09/15/2023    Lisinopril Myalgia and Unknown 09/15/2023    Triamterene-hydrochlorothiazid Myalgia 09/15/2023    Cephalexin Diarrhea 09/15/2023       Prescriptions Prior to Admission[1]     Medical History[2]    Surgical History[3]     reports that she has never smoked. She has never used smokeless tobacco. She reports that she does not drink alcohol and does not use drugs.                              Objective    Vitals:    08/04/25 1201 08/04/25 1255   BP: (!) 218/90 (!) 200/88   Pulse: 59    Resp: 20    Temp: 36.7 °C (98 °F)    TempSrc: Oral    SpO2: 96%      No LMP recorded. Patient is postmenopausal.    Physical Exam  Constitutional:       Appearance: Normal appearance. She is normal weight.   HENT:      Head: Normocephalic and atraumatic.      Left Ear: There is impacted cerumen.      Nose: Nose normal.      Mouth/Throat:      Mouth: Mucous membranes are moist.     Eyes:      Extraocular Movements: Extraocular movements intact.      Pupils: Pupils are equal, round, and reactive to light.       Cardiovascular:      Rate and Rhythm: Regular rhythm.  Bradycardia present.      Pulses: Normal pulses.      Heart sounds: Normal heart sounds.   Pulmonary:      Effort: Pulmonary effort is normal.      Breath sounds: Normal breath sounds.   Abdominal:      General: Abdomen is flat.      Palpations: Abdomen is soft.     Musculoskeletal:         General: No swelling, tenderness, deformity or signs of injury. Normal range of motion.      Cervical back: Normal range of motion and neck supple.     Skin:     General: Skin is warm.      Capillary Refill: Capillary refill takes less than 2 seconds.     Neurological:      General: No focal deficit present.      Mental Status: She is alert and oriented to person, place, and time.      Cranial Nerves: No cranial nerve deficit.      Sensory: No sensory deficit.      Motor: No weakness.      Coordination: Coordination normal.      Gait: Gait normal.      Deep Tendon Reflexes: Reflexes normal.     Psychiatric:         Mood and Affect: Mood normal.         Behavior: Behavior normal.         Ear Cerumen Removal    Date/Time: 8/4/2025 1:22 PM    Performed by: MARYANA Fuentes  Authorized by: MARYANA Fuentes    Consent:     Consent obtained:  Verbal    Consent given by:  Patient    Risks, benefits, and alternatives were discussed: yes      Risks discussed:  Bleeding, infection, pain, dizziness, incomplete removal and TM perforation    Alternatives discussed:  No treatment, delayed treatment, alternative treatment, observation and referral  Universal protocol:     Procedure explained and questions answered to patient or proxy's satisfaction: yes      Imaging studies available: no      Patient identity confirmed:  Verbally with patient  Procedure details:     Location:  L ear    Procedure type: irrigation      Procedure outcomes: cerumen removed    Post-procedure details:     Inspection:  Ear canal clear, no bleeding and TM intact    Hearing quality:  Improved    Procedure completion:  Tolerated      Point of Care  "Test & Imaging Results from this visit  No results found for this visit on 08/04/25.   Imaging  No results found.    Cardiology, Vascular, and Other Imaging  No other imaging results found for the past 2 days      Diagnostic study results (if any) were reviewed by MARYANA Fuentes.    Assessment/Plan   Allergies, medications, history, and pertinent labs/EKGs/Imaging reviewed by MARYANA Fuentes.     Medical Decision Making  EKG was send bradycardia 45 bpm.  Right bundle branch block.  No ST elevation or depression.  NIH was 0*Van negative.  No neurologic deficit.  Clear bilateral lung sounds.  Slight murmur appreciated 2/6.  Conjunctiva was normal in color.  Her left ear was impacted and I disimpacted ear before patient went across the street to the emergency room at patient's request.  There were no complications.  Tympanic membrane was intact.  There was no bleeding.  I then spoke with urgent care attending who agreed with sending patient over to the emergency department.  [facility] attending was notified and RAINA was notified.    Orders and Diagnoses  There are no diagnoses linked to this encounter.    Medical Admin Record      Patient disposition: ED. Transported by: Private Vehicle.    Electronically signed by MARYANA Fuentes  1:20 PM           [1] (Not in a hospital admission)  [2]   Past Medical History:  Diagnosis Date    Anxiety     Cellulitis of finger of left hand 09/15/2023    Hyperglycemia     Hyperlipidemia     Hypertension     IBS (irritable bowel syndrome)     Lactose intolerance     Osteoarthritis     Sciatica     Spinal stenosis    [3]   Past Surgical History:  Procedure Laterality Date    ADENOIDECTOMY      CHOLECYSTECTOMY  08/28/2014    ROBOTIC SINGLE PORT LAPAROSCOPIC    OTHER SURGICAL HISTORY      Spinal implant    OTHER SURGICAL HISTORY      MOLAR EXTRACTION    SPINE SURGERY  08/23/2022    \"TUBE IN SPINE\"    TONSILLECTOMY       "

## 2025-08-05 ENCOUNTER — APPOINTMENT (OUTPATIENT)
Dept: CARDIOLOGY | Facility: HOSPITAL | Age: 89
DRG: 229 | End: 2025-08-05
Payer: MEDICARE

## 2025-08-05 PROBLEM — I44.1 AV BLOCK, MOBITZ 2: Status: ACTIVE | Noted: 2025-08-04

## 2025-08-05 LAB
ABO GROUP (TYPE) IN BLOOD: NORMAL
ABO GROUP (TYPE) IN BLOOD: NORMAL
ANION GAP SERPL CALCULATED.3IONS-SCNC: 12 MMOL/L
ANTIBODY SCREEN: NORMAL
AORTIC VALVE PEAK VELOCITY: 1.46 M/S
AV PEAK GRADIENT: 9 MMHG
AVA (PEAK VEL): 2.54 CM2
BUN SERPL-MCNC: 18 MG/DL (ref 6–23)
CALCIUM SERPL-MCNC: 8.7 MG/DL (ref 8.6–10.3)
CHLORIDE SERPL-SCNC: 112 MMOL/L (ref 98–107)
CO2 SERPL-SCNC: 20 MMOL/L (ref 21–32)
CREAT SERPL-MCNC: 0.54 MG/DL (ref 0.5–1.05)
EGFRCR SERPLBLD CKD-EPI 2021: 89 ML/MIN/1.73M*2
EJECTION FRACTION APICAL 4 CHAMBER: 78.6
EJECTION FRACTION: 68 %
ERYTHROCYTE [DISTWIDTH] IN BLOOD BY AUTOMATED COUNT: 14 % (ref 11.5–14.5)
ERYTHROCYTE [DISTWIDTH] IN BLOOD BY AUTOMATED COUNT: 14.2 % (ref 11.5–14.5)
GLUCOSE SERPL-MCNC: 103 MG/DL (ref 74–99)
HCT VFR BLD AUTO: 33.2 % (ref 36–46)
HCT VFR BLD AUTO: 34.2 % (ref 36–46)
HGB BLD-MCNC: 11.1 G/DL (ref 12–16)
HGB BLD-MCNC: 12 G/DL (ref 12–16)
LEFT VENTRICLE INTERNAL DIMENSION DIASTOLE: 4.17 CM (ref 3.5–6)
LEFT VENTRICULAR OUTFLOW TRACT DIAMETER: 2.1 CM
LV EJECTION FRACTION BIPLANE: 72 %
MCH RBC QN AUTO: 32.7 PG (ref 26–34)
MCH RBC QN AUTO: 33.2 PG (ref 26–34)
MCHC RBC AUTO-ENTMCNC: 32.5 G/DL (ref 32–36)
MCHC RBC AUTO-ENTMCNC: 36.1 G/DL (ref 32–36)
MCV RBC AUTO: 101 FL (ref 80–100)
MCV RBC AUTO: 92 FL (ref 80–100)
MITRAL VALVE E/A RATIO: 1.31
NRBC BLD-RTO: 0 /100 WBCS (ref 0–0)
NRBC BLD-RTO: 0 /100 WBCS (ref 0–0)
PLATELET # BLD AUTO: 127 X10*3/UL (ref 150–450)
PLATELET # BLD AUTO: 137 X10*3/UL (ref 150–450)
POTASSIUM SERPL-SCNC: 3.4 MMOL/L (ref 3.5–5.3)
RBC # BLD AUTO: 3.39 X10*6/UL (ref 4–5.2)
RBC # BLD AUTO: 3.61 X10*6/UL (ref 4–5.2)
RH FACTOR (ANTIGEN D): NORMAL
RH FACTOR (ANTIGEN D): NORMAL
RIGHT VENTRICLE FREE WALL PEAK S': 14.3 CM/S
RIGHT VENTRICLE PEAK SYSTOLIC PRESSURE: 55 MMHG
SODIUM SERPL-SCNC: 141 MMOL/L (ref 136–145)
TRICUSPID ANNULAR PLANE SYSTOLIC EXCURSION: 2.2 CM
WBC # BLD AUTO: 4.4 X10*3/UL (ref 4.4–11.3)
WBC # BLD AUTO: 5.6 X10*3/UL (ref 4.4–11.3)

## 2025-08-05 PROCEDURE — 80048 BASIC METABOLIC PNL TOTAL CA: CPT | Performed by: INTERNAL MEDICINE

## 2025-08-05 PROCEDURE — 2780000003 HC OR 278 NO HCPCS: Performed by: STUDENT IN AN ORGANIZED HEALTH CARE EDUCATION/TRAINING PROGRAM

## 2025-08-05 PROCEDURE — 99152 MOD SED SAME PHYS/QHP 5/>YRS: CPT | Performed by: STUDENT IN AN ORGANIZED HEALTH CARE EDUCATION/TRAINING PROGRAM

## 2025-08-05 PROCEDURE — 99232 SBSQ HOSP IP/OBS MODERATE 35: CPT | Performed by: INTERNAL MEDICINE

## 2025-08-05 PROCEDURE — 85027 COMPLETE CBC AUTOMATED: CPT | Performed by: STUDENT IN AN ORGANIZED HEALTH CARE EDUCATION/TRAINING PROGRAM

## 2025-08-05 PROCEDURE — 99153 MOD SED SAME PHYS/QHP EA: CPT | Performed by: STUDENT IN AN ORGANIZED HEALTH CARE EDUCATION/TRAINING PROGRAM

## 2025-08-05 PROCEDURE — 2500000001 HC RX 250 WO HCPCS SELF ADMINISTERED DRUGS (ALT 637 FOR MEDICARE OP): Performed by: INTERNAL MEDICINE

## 2025-08-05 PROCEDURE — 2500000002 HC RX 250 W HCPCS SELF ADMINISTERED DRUGS (ALT 637 FOR MEDICARE OP, ALT 636 FOR OP/ED): Performed by: INTERNAL MEDICINE

## 2025-08-05 PROCEDURE — 2060000001 HC INTERMEDIATE ICU ROOM DAILY

## 2025-08-05 PROCEDURE — 33274 TCAT INSJ/RPL PERM LDLS PM: CPT | Performed by: STUDENT IN AN ORGANIZED HEALTH CARE EDUCATION/TRAINING PROGRAM

## 2025-08-05 PROCEDURE — 99233 SBSQ HOSP IP/OBS HIGH 50: CPT | Performed by: STUDENT IN AN ORGANIZED HEALTH CARE EDUCATION/TRAINING PROGRAM

## 2025-08-05 PROCEDURE — 2750000001 HC OR 275 NO HCPCS: Performed by: STUDENT IN AN ORGANIZED HEALTH CARE EDUCATION/TRAINING PROGRAM

## 2025-08-05 PROCEDURE — C1769 GUIDE WIRE: HCPCS | Performed by: STUDENT IN AN ORGANIZED HEALTH CARE EDUCATION/TRAINING PROGRAM

## 2025-08-05 PROCEDURE — 2720000007 HC OR 272 NO HCPCS: Performed by: STUDENT IN AN ORGANIZED HEALTH CARE EDUCATION/TRAINING PROGRAM

## 2025-08-05 PROCEDURE — 36415 COLL VENOUS BLD VENIPUNCTURE: CPT | Performed by: INTERNAL MEDICINE

## 2025-08-05 PROCEDURE — C1730 CATH, EP, 19 OR FEW ELECT: HCPCS | Performed by: STUDENT IN AN ORGANIZED HEALTH CARE EDUCATION/TRAINING PROGRAM

## 2025-08-05 PROCEDURE — 36415 COLL VENOUS BLD VENIPUNCTURE: CPT | Performed by: STUDENT IN AN ORGANIZED HEALTH CARE EDUCATION/TRAINING PROGRAM

## 2025-08-05 PROCEDURE — 2500000004 HC RX 250 GENERAL PHARMACY W/ HCPCS (ALT 636 FOR OP/ED): Performed by: INTERNAL MEDICINE

## 2025-08-05 PROCEDURE — 93306 TTE W/DOPPLER COMPLETE: CPT

## 2025-08-05 PROCEDURE — C1760 CLOSURE DEV, VASC: HCPCS | Performed by: STUDENT IN AN ORGANIZED HEALTH CARE EDUCATION/TRAINING PROGRAM

## 2025-08-05 PROCEDURE — 2500000004 HC RX 250 GENERAL PHARMACY W/ HCPCS (ALT 636 FOR OP/ED): Performed by: STUDENT IN AN ORGANIZED HEALTH CARE EDUCATION/TRAINING PROGRAM

## 2025-08-05 PROCEDURE — 93306 TTE W/DOPPLER COMPLETE: CPT | Performed by: INTERNAL MEDICINE

## 2025-08-05 PROCEDURE — X2HK3V9 INSERTION OF DUAL-CHAMBER INTRACARDIAC PACEMAKER INTO RIGHT VENTRICLE, PERCUTANEOUS APPROACH, NEW TECHNOLOGY GROUP 9: ICD-10-PCS | Performed by: STUDENT IN AN ORGANIZED HEALTH CARE EDUCATION/TRAINING PROGRAM

## 2025-08-05 PROCEDURE — 2500000004 HC RX 250 GENERAL PHARMACY W/ HCPCS (ALT 636 FOR OP/ED): Performed by: NURSE PRACTITIONER

## 2025-08-05 PROCEDURE — C1894 INTRO/SHEATH, NON-LASER: HCPCS | Performed by: STUDENT IN AN ORGANIZED HEALTH CARE EDUCATION/TRAINING PROGRAM

## 2025-08-05 PROCEDURE — 85027 COMPLETE CBC AUTOMATED: CPT | Performed by: INTERNAL MEDICINE

## 2025-08-05 PROCEDURE — 2550000001 HC RX 255 CONTRASTS: Performed by: STUDENT IN AN ORGANIZED HEALTH CARE EDUCATION/TRAINING PROGRAM

## 2025-08-05 PROCEDURE — 86901 BLOOD TYPING SEROLOGIC RH(D): CPT | Performed by: STUDENT IN AN ORGANIZED HEALTH CARE EDUCATION/TRAINING PROGRAM

## 2025-08-05 PROCEDURE — C1786 PMKR, SINGLE, RATE-RESP: HCPCS | Performed by: STUDENT IN AN ORGANIZED HEALTH CARE EDUCATION/TRAINING PROGRAM

## 2025-08-05 DEVICE — TPS MC2AVR1 MICRA AV2 US
Type: IMPLANTABLE DEVICE | Site: HEART | Status: FUNCTIONAL
Brand: MICRA™ AV2

## 2025-08-05 RX ORDER — HEPARIN SODIUM 1000 [USP'U]/ML
INJECTION, SOLUTION INTRAVENOUS; SUBCUTANEOUS AS NEEDED
Status: DISCONTINUED | OUTPATIENT
Start: 2025-08-05 | End: 2025-08-05 | Stop reason: HOSPADM

## 2025-08-05 RX ORDER — VANCOMYCIN 1 G/200ML
1000 INJECTION, SOLUTION INTRAVENOUS ONCE
Status: COMPLETED | OUTPATIENT
Start: 2025-08-05 | End: 2025-08-05

## 2025-08-05 RX ORDER — IODIXANOL 320 MG/ML
INJECTION, SOLUTION INTRAVASCULAR AS NEEDED
Status: DISCONTINUED | OUTPATIENT
Start: 2025-08-05 | End: 2025-08-05 | Stop reason: HOSPADM

## 2025-08-05 RX ORDER — FENTANYL CITRATE 50 UG/ML
INJECTION, SOLUTION INTRAMUSCULAR; INTRAVENOUS AS NEEDED
Status: DISCONTINUED | OUTPATIENT
Start: 2025-08-05 | End: 2025-08-05 | Stop reason: HOSPADM

## 2025-08-05 RX ORDER — BUPIVACAINE HYDROCHLORIDE 2.5 MG/ML
INJECTION, SOLUTION EPIDURAL; INFILTRATION; INTRACAUDAL; PERINEURAL AS NEEDED
Status: DISCONTINUED | OUTPATIENT
Start: 2025-08-05 | End: 2025-08-05 | Stop reason: HOSPADM

## 2025-08-05 RX ORDER — MIDAZOLAM HYDROCHLORIDE 1 MG/ML
INJECTION, SOLUTION INTRAMUSCULAR; INTRAVENOUS AS NEEDED
Status: DISCONTINUED | OUTPATIENT
Start: 2025-08-05 | End: 2025-08-05 | Stop reason: HOSPADM

## 2025-08-05 RX ORDER — POTASSIUM CHLORIDE 20 MEQ/1
40 TABLET, EXTENDED RELEASE ORAL ONCE
Status: COMPLETED | OUTPATIENT
Start: 2025-08-05 | End: 2025-08-05

## 2025-08-05 RX ADMIN — TRAMADOL HYDROCHLORIDE 50 MG: 50 TABLET, COATED ORAL at 20:18

## 2025-08-05 RX ADMIN — HYDRALAZINE HYDROCHLORIDE 5 MG: 20 INJECTION INTRAMUSCULAR; INTRAVENOUS at 05:09

## 2025-08-05 RX ADMIN — CALCIUM 1 TABLET: 500 TABLET ORAL at 09:44

## 2025-08-05 RX ADMIN — IBUPROFEN 600 MG: 600 TABLET, FILM COATED ORAL at 09:44

## 2025-08-05 RX ADMIN — HYDRALAZINE HYDROCHLORIDE 5 MG: 20 INJECTION INTRAMUSCULAR; INTRAVENOUS at 18:29

## 2025-08-05 RX ADMIN — AMLODIPINE BESYLATE 5 MG: 5 TABLET ORAL at 11:30

## 2025-08-05 RX ADMIN — HYDRALAZINE HYDROCHLORIDE 5 MG: 20 INJECTION INTRAMUSCULAR; INTRAVENOUS at 11:30

## 2025-08-05 RX ADMIN — BUSPIRONE HYDROCHLORIDE 15 MG: 15 TABLET ORAL at 20:17

## 2025-08-05 RX ADMIN — BUSPIRONE HYDROCHLORIDE 15 MG: 15 TABLET ORAL at 09:44

## 2025-08-05 RX ADMIN — Medication 50 MCG: at 07:53

## 2025-08-05 RX ADMIN — VANCOMYCIN 1000 MG: 1 INJECTION, SOLUTION INTRAVENOUS at 15:01

## 2025-08-05 RX ADMIN — POTASSIUM CHLORIDE EXTENDED-RELEASE 40 MEQ: 1500 TABLET ORAL at 07:53

## 2025-08-05 RX ADMIN — CETIRIZINE HYDROCHLORIDE 10 MG: 10 TABLET, FILM COATED ORAL at 09:44

## 2025-08-05 RX ADMIN — LOSARTAN POTASSIUM 100 MG: 100 TABLET, FILM COATED ORAL at 09:44

## 2025-08-05 RX ADMIN — EZETIMIBE 10 MG: 10 TABLET ORAL at 09:44

## 2025-08-05 SDOH — SOCIAL STABILITY: SOCIAL NETWORK
DO YOU BELONG TO ANY CLUBS OR ORGANIZATIONS SUCH AS CHURCH GROUPS, UNIONS, FRATERNAL OR ATHLETIC GROUPS, OR SCHOOL GROUPS?: PATIENT DECLINED

## 2025-08-05 SDOH — SOCIAL STABILITY: SOCIAL NETWORK: IN A TYPICAL WEEK, HOW MANY TIMES DO YOU TALK ON THE PHONE WITH FAMILY, FRIENDS, OR NEIGHBORS?: PATIENT DECLINED

## 2025-08-05 SDOH — ECONOMIC STABILITY: HOUSING INSECURITY: AT ANY TIME IN THE PAST 12 MONTHS, WERE YOU HOMELESS OR LIVING IN A SHELTER (INCLUDING NOW)?: NO

## 2025-08-05 SDOH — ECONOMIC STABILITY: FOOD INSECURITY: WITHIN THE PAST 12 MONTHS, YOU WORRIED THAT YOUR FOOD WOULD RUN OUT BEFORE YOU GOT THE MONEY TO BUY MORE.: NEVER TRUE

## 2025-08-05 SDOH — ECONOMIC STABILITY: HOUSING INSECURITY: IN THE LAST 12 MONTHS, WAS THERE A TIME WHEN YOU WERE NOT ABLE TO PAY THE MORTGAGE OR RENT ON TIME?: NO

## 2025-08-05 SDOH — ECONOMIC STABILITY: INCOME INSECURITY: IN THE PAST 12 MONTHS HAS THE ELECTRIC, GAS, OIL, OR WATER COMPANY THREATENED TO SHUT OFF SERVICES IN YOUR HOME?: NO

## 2025-08-05 SDOH — HEALTH STABILITY: MENTAL HEALTH
DO YOU FEEL STRESS - TENSE, RESTLESS, NERVOUS, OR ANXIOUS, OR UNABLE TO SLEEP AT NIGHT BECAUSE YOUR MIND IS TROUBLED ALL THE TIME - THESE DAYS?: NOT AT ALL

## 2025-08-05 SDOH — SOCIAL STABILITY: SOCIAL INSECURITY
WITHIN THE LAST YEAR, HAVE YOU BEEN KICKED, HIT, SLAPPED, OR OTHERWISE PHYSICALLY HURT BY YOUR PARTNER OR EX-PARTNER?: PATIENT DECLINED

## 2025-08-05 SDOH — ECONOMIC STABILITY: HOUSING INSECURITY: IN THE PAST 12 MONTHS, HOW MANY TIMES HAVE YOU MOVED WHERE YOU WERE LIVING?: 0

## 2025-08-05 SDOH — SOCIAL STABILITY: SOCIAL INSECURITY: ARE YOU MARRIED, WIDOWED, DIVORCED, SEPARATED, NEVER MARRIED, OR LIVING WITH A PARTNER?: PATIENT DECLINED

## 2025-08-05 SDOH — SOCIAL STABILITY: SOCIAL INSECURITY: ARE YOU OR HAVE YOU BEEN THREATENED OR ABUSED PHYSICALLY, EMOTIONALLY, OR SEXUALLY BY ANYONE?: NO

## 2025-08-05 SDOH — SOCIAL STABILITY: SOCIAL INSECURITY
WITHIN THE LAST YEAR, HAVE YOU BEEN RAPED OR FORCED TO HAVE ANY KIND OF SEXUAL ACTIVITY BY YOUR PARTNER OR EX-PARTNER?: PATIENT DECLINED

## 2025-08-05 SDOH — ECONOMIC STABILITY: FOOD INSECURITY: WITHIN THE PAST 12 MONTHS, THE FOOD YOU BOUGHT JUST DIDN'T LAST AND YOU DIDN'T HAVE MONEY TO GET MORE.: NEVER TRUE

## 2025-08-05 SDOH — ECONOMIC STABILITY: INCOME INSECURITY
IN THE PAST 12 MONTHS HAS THE ELECTRIC, GAS, OIL, OR WATER COMPANY THREATENED TO SHUT OFF SERVICES IN YOUR HOME?: PATIENT DECLINED

## 2025-08-05 SDOH — SOCIAL STABILITY: SOCIAL INSECURITY
WITHIN THE LAST YEAR, HAVE YOU BEEN KICKED, HIT, SLAPPED, OR OTHERWISE PHYSICALLY HURT BY YOUR PARTNER OR EX-PARTNER?: NO

## 2025-08-05 SDOH — SOCIAL STABILITY: SOCIAL INSECURITY: WITHIN THE LAST YEAR, HAVE YOU BEEN HUMILIATED OR EMOTIONALLY ABUSED IN OTHER WAYS BY YOUR PARTNER OR EX-PARTNER?: NO

## 2025-08-05 SDOH — HEALTH STABILITY: PHYSICAL HEALTH: ON AVERAGE, HOW MANY DAYS PER WEEK DO YOU ENGAGE IN MODERATE TO STRENUOUS EXERCISE (LIKE A BRISK WALK)?: 0 DAYS

## 2025-08-05 SDOH — SOCIAL STABILITY: SOCIAL NETWORK: HOW OFTEN DO YOU ATTEND CHURCH OR RELIGIOUS SERVICES?: PATIENT DECLINED

## 2025-08-05 SDOH — HEALTH STABILITY: PHYSICAL HEALTH: ON AVERAGE, HOW MANY MINUTES DO YOU ENGAGE IN EXERCISE AT THIS LEVEL?: 0 MIN

## 2025-08-05 SDOH — HEALTH STABILITY: MENTAL HEALTH: HOW OFTEN DO YOU HAVE A DRINK CONTAINING ALCOHOL?: PATIENT DECLINED

## 2025-08-05 SDOH — SOCIAL STABILITY: SOCIAL INSECURITY: WITHIN THE LAST YEAR, HAVE YOU BEEN AFRAID OF YOUR PARTNER OR EX-PARTNER?: NO

## 2025-08-05 SDOH — HEALTH STABILITY: MENTAL HEALTH: HOW OFTEN DO YOU HAVE SIX OR MORE DRINKS ON ONE OCCASION?: PATIENT DECLINED

## 2025-08-05 SDOH — SOCIAL STABILITY: SOCIAL INSECURITY
WITHIN THE LAST YEAR, HAVE YOU BEEN RAPED OR FORCED TO HAVE ANY KIND OF SEXUAL ACTIVITY BY YOUR PARTNER OR EX-PARTNER?: NO

## 2025-08-05 SDOH — ECONOMIC STABILITY: TRANSPORTATION INSECURITY: IN THE PAST 12 MONTHS, HAS LACK OF TRANSPORTATION KEPT YOU FROM MEDICAL APPOINTMENTS OR FROM GETTING MEDICATIONS?: NO

## 2025-08-05 SDOH — SOCIAL STABILITY: SOCIAL NETWORK: HOW OFTEN DO YOU ATTEND MEETINGS OF THE CLUBS OR ORGANIZATIONS YOU BELONG TO?: PATIENT DECLINED

## 2025-08-05 SDOH — SOCIAL STABILITY: SOCIAL NETWORK: HOW OFTEN DO YOU GET TOGETHER WITH FRIENDS OR RELATIVES?: PATIENT DECLINED

## 2025-08-05 SDOH — ECONOMIC STABILITY: FOOD INSECURITY: HOW HARD IS IT FOR YOU TO PAY FOR THE VERY BASICS LIKE FOOD, HOUSING, MEDICAL CARE, AND HEATING?: NOT HARD AT ALL

## 2025-08-05 SDOH — SOCIAL STABILITY: SOCIAL INSECURITY: WITHIN THE LAST YEAR, HAVE YOU BEEN AFRAID OF YOUR PARTNER OR EX-PARTNER?: PATIENT DECLINED

## 2025-08-05 SDOH — HEALTH STABILITY: MENTAL HEALTH: HOW MANY DRINKS CONTAINING ALCOHOL DO YOU HAVE ON A TYPICAL DAY WHEN YOU ARE DRINKING?: PATIENT DECLINED

## 2025-08-05 SDOH — SOCIAL STABILITY: SOCIAL INSECURITY: HAS ANYONE EVER THREATENED TO HURT YOUR FAMILY OR YOUR PETS?: NO

## 2025-08-05 SDOH — SOCIAL STABILITY: SOCIAL INSECURITY: WERE YOU ABLE TO COMPLETE ALL THE BEHAVIORAL HEALTH SCREENINGS?: YES

## 2025-08-05 SDOH — HEALTH STABILITY: PHYSICAL HEALTH
HOW OFTEN DO YOU NEED TO HAVE SOMEONE HELP YOU WHEN YOU READ INSTRUCTIONS, PAMPHLETS, OR OTHER WRITTEN MATERIAL FROM YOUR DOCTOR OR PHARMACY?: PATIENT DECLINES TO RESPOND

## 2025-08-05 SDOH — SOCIAL STABILITY: SOCIAL INSECURITY
WITHIN THE LAST YEAR, HAVE YOU BEEN HUMILIATED OR EMOTIONALLY ABUSED IN OTHER WAYS BY YOUR PARTNER OR EX-PARTNER?: PATIENT DECLINED

## 2025-08-05 SDOH — SOCIAL STABILITY: SOCIAL INSECURITY: HAVE YOU HAD ANY THOUGHTS OF HARMING ANYONE ELSE?: NO

## 2025-08-05 SDOH — SOCIAL STABILITY: SOCIAL INSECURITY: ARE THERE ANY APPARENT SIGNS OF INJURIES/BEHAVIORS THAT COULD BE RELATED TO ABUSE/NEGLECT?: NO

## 2025-08-05 SDOH — SOCIAL STABILITY: SOCIAL INSECURITY: DOES ANYONE TRY TO KEEP YOU FROM HAVING/CONTACTING OTHER FRIENDS OR DOING THINGS OUTSIDE YOUR HOME?: NO

## 2025-08-05 SDOH — SOCIAL STABILITY: SOCIAL INSECURITY: ABUSE: ADULT

## 2025-08-05 SDOH — SOCIAL STABILITY: SOCIAL INSECURITY: HAVE YOU HAD THOUGHTS OF HARMING ANYONE ELSE?: NO

## 2025-08-05 SDOH — SOCIAL STABILITY: SOCIAL INSECURITY: DO YOU FEEL UNSAFE GOING BACK TO THE PLACE WHERE YOU ARE LIVING?: NO

## 2025-08-05 SDOH — SOCIAL STABILITY: SOCIAL INSECURITY: DO YOU FEEL ANYONE HAS EXPLOITED OR TAKEN ADVANTAGE OF YOU FINANCIALLY OR OF YOUR PERSONAL PROPERTY?: NO

## 2025-08-05 ASSESSMENT — COGNITIVE AND FUNCTIONAL STATUS - GENERAL
CLIMB 3 TO 5 STEPS WITH RAILING: A LITTLE
MOBILITY SCORE: 21
WALKING IN HOSPITAL ROOM: A LITTLE
STANDING UP FROM CHAIR USING ARMS: A LITTLE
MOBILITY SCORE: 21
DAILY ACTIVITIY SCORE: 24
STANDING UP FROM CHAIR USING ARMS: A LITTLE
WALKING IN HOSPITAL ROOM: A LITTLE
PATIENT BASELINE BEDBOUND: NO
CLIMB 3 TO 5 STEPS WITH RAILING: A LITTLE
DAILY ACTIVITIY SCORE: 24

## 2025-08-05 ASSESSMENT — ACTIVITIES OF DAILY LIVING (ADL)
LACK_OF_TRANSPORTATION: NO
ADEQUATE_TO_COMPLETE_ADL: YES
LACK_OF_TRANSPORTATION: NO
HEARING - LEFT EAR: FUNCTIONAL
TOILETING: INDEPENDENT
WALKS IN HOME: INDEPENDENT
FEEDING YOURSELF: INDEPENDENT
LACK_OF_TRANSPORTATION: NO
HEARING - LEFT EAR: FUNCTIONAL
FEEDING YOURSELF: INDEPENDENT
ADEQUATE_TO_COMPLETE_ADL: YES
BATHING: INDEPENDENT
GROOMING: INDEPENDENT
BATHING: INDEPENDENT
TOILETING: INDEPENDENT
DRESSING YOURSELF: INDEPENDENT
HEARING - RIGHT EAR: FUNCTIONAL
PATIENT'S MEMORY ADEQUATE TO SAFELY COMPLETE DAILY ACTIVITIES?: YES
LACK_OF_TRANSPORTATION: NO
PATIENT'S MEMORY ADEQUATE TO SAFELY COMPLETE DAILY ACTIVITIES?: YES
GROOMING: INDEPENDENT
JUDGMENT_ADEQUATE_SAFELY_COMPLETE_DAILY_ACTIVITIES: YES
JUDGMENT_ADEQUATE_SAFELY_COMPLETE_DAILY_ACTIVITIES: YES
WALKS IN HOME: INDEPENDENT
HEARING - RIGHT EAR: FUNCTIONAL
LACK_OF_TRANSPORTATION: NO

## 2025-08-05 ASSESSMENT — PAIN - FUNCTIONAL ASSESSMENT
PAIN_FUNCTIONAL_ASSESSMENT: 0-10
PAIN_FUNCTIONAL_ASSESSMENT: 0-10

## 2025-08-05 ASSESSMENT — LIFESTYLE VARIABLES
AUDIT-C TOTAL SCORE: 0
AUDIT-C TOTAL SCORE: 0
HOW MANY STANDARD DRINKS CONTAINING ALCOHOL DO YOU HAVE ON A TYPICAL DAY: PATIENT DOES NOT DRINK
SKIP TO QUESTIONS 9-10: 0
SKIP TO QUESTIONS 9-10: 1
HOW OFTEN DO YOU HAVE A DRINK CONTAINING ALCOHOL: NEVER
HOW OFTEN DO YOU HAVE 6 OR MORE DRINKS ON ONE OCCASION: NEVER
AUDIT-C TOTAL SCORE: -1

## 2025-08-05 ASSESSMENT — PAIN SCALES - GENERAL
PAINLEVEL_OUTOF10: 0 - NO PAIN

## 2025-08-05 NOTE — CARE PLAN
"Pt said that her son Seb is her POA --document is not on file  ADOD: 2 days    Pt lives alone, she uses a cane to ambulate with and she denies falls in the last 6 months.  Her bed and bath are on the 2nd floor of her house, she also has a basemnt. She said that she uses the stairs \"slowly\"  She is able to drive. Her dtr assist with shopping, cooking, cleaning and the laundry  She wears glasses, no hearing aids.  Denies depression and anxiety  No home 02, Cpap, or Bipap  She uses a home delivery pharmacy and [facility] in [place], no issues paying for meds  She will see a new PCP on Sept 2nd, Dr. Valverde.  She declined to answer all of the Care Coordination assessment questions. She said that she was too tired.  Pt is here with dizziness, HR 40-42, and high blood pressure  Pt is scheduled for a pacer.  No anticipated discharge needs at this time.    DISCHARGE PLAN: HOME WITH ASSISTANCE FROM FAMILY  "

## 2025-08-05 NOTE — PROGRESS NOTES
08/05/25 1030   ACS Disability Status   Are you deaf or do you have serious difficulty hearing? N   Are you blind or do you have serious difficulty seeing, even when wearing glasses? N   Because of a physical, mental, or emotional condition, do you have serious difficulty concentrating, remembering, or making decisions? (5 years old or older) N   Do you have serious difficulty walking or climbing stairs? Y   Do you have serious difficulty dressing or bathing? N   Because of a physical, mental, or emotional condition, do you have serious difficulty doing errands alone such as visiting the doctor? Y

## 2025-08-05 NOTE — CARE PLAN
The patient's goals for the shift include      The clinical goals for the shift include admission, blood pressure control    Over the shift, the patient did not make progress toward the following goals. Barriers to progression include the patient. Recommendations to address these barriers include educate patient on medications.

## 2025-08-05 NOTE — ED PROVIDER NOTES
Emergency Department Provider Note       History of Present Illness     History provided by: Patient  Limitations to History: None  External Records Reviewed with Brief Summary: discussed with urgent clinic staff who saw her previously.     HPI:  Estrella Villafana is a 89 y.o. female presents for bradycardia.  She states that she initially went to urgent care today because she was having sinus pressure as well as congestion for the past day or 2.  While there she was found to be bradycardic and advised to come to the ED.  Upon my evaluation her EKG here it shows 2-1 second-degree heart block.  I asked her if she is not having palpitations or dyspnea, she does tell me that over the past 2 or 3 weeks she has noticed that she gets very tired very easily.  She states that she generally has good energy but more recently she is been very tired with even walking across the room    She takes all her blood pressure medications as prescribed    Physical Exam   Triage vitals:  T 36.4 °C (97.5 °F)  HR (!) 46  BP (!) 216/74  RR 20  O2 96 % None (Room air)    Physical Exam  Vitals and nursing note reviewed.   Constitutional:       Appearance: Normal appearance.   HENT:      Head: Normocephalic and atraumatic.      Nose: Nose normal.      Mouth/Throat:      Mouth: Mucous membranes are moist.     Eyes:      Extraocular Movements: Extraocular movements intact.      Conjunctiva/sclera: Conjunctivae normal.       Cardiovascular:      Rate and Rhythm: Regular rhythm. Bradycardia present.      Pulses: Normal pulses.      Heart sounds: Normal heart sounds.   Pulmonary:      Effort: Pulmonary effort is normal.      Breath sounds: Normal breath sounds.     Musculoskeletal:         General: Normal range of motion.      Cervical back: Normal range of motion and neck supple.     Skin:     General: Skin is warm and dry.      Capillary Refill: Capillary refill takes less than 2 seconds.     Neurological:      General: No focal deficit  present.      Mental Status: She is alert and oriented to person, place, and time. Mental status is at baseline.           Medical Decision Making & ED Course   Medical Decision Makin y.o. female presents for second-degree heart block Mobitz type II    I reviewed previous records, there are no old EKGs to compare to however looking at old clinic notes she generally has a heart rate between 70 and 90.  Reviewing the EKG, she has Mobitz type II with a 2-1 block.  She is otherwise not hypotensive whatsoever, current blood pressure in the 200s systolic.  I did however place her on the defibrillator pads as a precaution    I did have my midlevel urgently contact cardiology given the Mobitz type II block.  Dr. Arriaga will consult on the patient however she will likely have pacemaker placement either later today or tomorrow morning.    Patient labs evaluated, minimal troponinemia of the low 20s, urinalysis unremarkable, CBC and CMP unremarkable.    Dr. Arriaga evaluated at bedside, likely pacemaker in the morning with goals to better control blood pressure overnight.     Patient otherwise appears very anxious. Will avoid rate controlling medications. I did order her 10mg of amlodipine and discussed calming techniques and to avoid thinking about her blood pressure.   ----      Differential diagnoses considered include but are not limited to: 2nd degree heart block mobitz type 2     Social Determinants of Health which Significantly Impact Care: Social Determinants of Health which Significantly Impact Care: None identified     EKG Independent Interpretation: EKG interpreted by myself. Please see ED Course for full interpretation.    Independent Result Review and Interpretation: Relevant laboratory and radiographic results were reviewed and independently interpreted by myself.  As necessary, they are commented on in the ED Course.    Chronic conditions affecting the patient's care: As documented above in MDM    The  patient was discussed with the following consultants/services: None    Care Considerations: Considered ordering atropine , but chose not to because not in shock.     ED Course:  ED Course as of 08/04/25 2324   Mon Aug 04, 2025   1344 EKG interpreted by myself at 1344  2:1 block, mobitz type 2, RBBB  Cards contacted.  [SY]   1400 Consult with Dr. Arriaga from electrophysiology.  Will plan to do cardiac device [JH]      ED Course User Index  [JH] Lee Mott PA-C  [SY] Damian Ruffin MD         Diagnoses as of 08/04/25 2324   Lightheaded   AV block, Mobitz 2       Disposition   As a result of their workup, the patient will require admission to the hospital.  The patient was informed of her diagnosis.  The patient was given the opportunity to ask questions and I answered them. The patient agreed to be admitted to the hospital.    Procedures   Critical Care    Performed by: Damian Ruffin MD  Authorized by: Damian Ruffin MD    Critical care provider statement:     Critical care time (minutes):  35    Critical care time was exclusive of:  Separately billable procedures and treating other patients and teaching time    Critical care was necessary to treat or prevent imminent or life-threatening deterioration of the following conditions:  Cardiac failure    Critical care was time spent personally by me on the following activities:  Ordering and performing treatments and interventions, ordering and review of laboratory studies, development of treatment plan with patient or surrogate, discussions with consultants, ordering and review of radiographic studies, discussions with primary provider, pulse oximetry, evaluation of patient's response to treatment, obtaining history from patient or surrogate, re-evaluation of patient's condition and examination of patient          Damian Ruffin MD  Emergency Medicine                                                       Damian Ruffin MD  08/05/25 0002

## 2025-08-05 NOTE — POST-PROCEDURE NOTE
Physician Transition of Care Summary  Invasive Cardiovascular Lab    Procedure Date: 8/5/2025  Attending:    * Timothy Amos - Primary  Resident/Fellow/Other Assistant: Surgeons and Role:  * No surgeons found with a matching role *    Indications:   Pre-op Diagnosis      * Lightheaded [R42]     * AV block, Mobitz 2 [I44.1]    Post-procedure diagnosis:   Post-op Diagnosis     * Lightheaded [R42]     * AV block, Mobitz 2 [I44.1]    Procedure(s):   PPM Leadless Implant  86698 - UT TCAT INSJ/RPL PERM LEADLESS PACEMAKER RV W/IMG        Procedure Findings:   See below    Description of the Procedure:     PRE-PROCEDURE:   Informed consent was obtained. The patient presented to the Electrophysiology Lab in a fasting, nonsedated state. Continuous 12-lead electrocardiography and pulse oximetry were established. Noninvasive blood pressure was checked every 5 minutes. The patient was sedated as noted above. Transcutaneous pacing pads were placed on the patient in the axillary position, and ventricular capture was confirmed during transcutaneous pacing after the patient was sedated.    ANTIBIOTIC PROPHYLAXIS was administered intravenously.    VASCULAR ACCESS:  Bilateral groins were prepped and draped in sterile fashion. The right groin was anesthetized with bupivacaine. The right femoral vein was cannulated with a modified Seldinger technique under vascular ultrasound guidance. An approximately one-centimeter nick was made in the skin using a #11 blade scalpel. A 6-French short sheath was inserted. Heparin 5000 unit bolus was administered after vascular access, and additional boluses were administered as needed. The groin was Pre-closed with 2 Perclose devices, which were clamped with a Bess during the case. Left groin access was obtained. A 6F sheath was introduced and a Juan Carlos catheter was advanced in the RV for back up pacing.    INSERTION OF THE DELIVERY SHEATH:  The 6 French sheath was removed and the insertion  "site was dilated with a Dania Taper. Subsequently, the Micra delivery sheath and dilator were advanced over the Amplatz wire after activating the hydrophilic coating with heparinized saline. Using a gentle rotational motion, the sheath was advanced to the middle of the right atrium under fluoroscopic guidance. The dilator and wire were removed. The sheath was flushed with saline.     INSERTION OF THE PACEMAKER:  The Micra delivery catheter was flushed with heparinized saline. Care was taken to ensure no air bubbles, and bubbles were removed gently without flicking the pacemaker or its delivery catheter. The pacemaker delivery catheter was inserted into the delivery sheath during continuous flushing with heparinized saline. Subsequently, the delivery catheter was connected to continuous heparinized saline flow. The delivery catheter was advanced to the tip of the sheath, and the sheath was subsequently withdrawn to the inferior vena cava. The delivery catheter was gently curved towards the right ventricle and advanced across the tricuspid valve under direct visualization in orthogonal views. The catheter was advanced gently until it was opposed to the right ventricular septum. Contrast was injected through the catheter and observed under cine in orthogonal views, and the catheter was subsequently flushed. After confirming proper position along the RV septum, the catheter was advanced gently until it had a \"gooseneck\" shape under fluoroscopy. The prongs were then deployed, and the delivery catheter was withdrawn slightly, while still tethered to the pacemaker. Lead parameters were tested and found to be satisfactory. The sutures were then unlocked in the delivery catheter was withdrawn enough to confirm proper seating of the pacemaker. Lead parameters were again tested and found to be satisfactory. Under cine, gentle traction was applied to the sutures and then released. The cine was reviewed to confirm that at least " 2 of the 4 prongs showed engagement by flexion under tension. Lead parameters were tested one more time to confirm stable sensing, impedance, and capture threshold. The delivery catheter was then advanced slightly so that it was coaxial with the pacemaker and across the tricuspid valve annulus. Gentle traction was placed on the sutures to determine smooth movement. One end of the suture was cut, and the suture was then withdrawn slowly under fluoroscopic observation of the pacemaker, until the suture was entirely out of the delivery catheter. The delivery catheter was then removed from the sheath. The sheath was then gently retracted from the body, and the two pre-closed Perclose devices were deployed as the sheath was removed from the body. The Juan Carlos catheter and 6F sheath were removed and a Vascade MVP closure device was used.     TOTAL FLUOROSCOPY TIME: See attached report.    LEAD MEASUREMENTS  · R-wave amplitude: 7.2   · Capture threshold: 0.63 V @ 0.24ms  · Impedance: 720 Ohms    HARDWARE   Pacemaker: Medtronic Micra [id number] MRI-compatible.     ESTIMATED BLOOD LOSS  15 mL    COMPLICATIONS  No immediate.    CONCLUSIONS  Successful transcatheter insertion of permanent leadless pacemaker in the right ventricular septum.    RECOMMENDATIONS  2 hours bedrest  No further antibiotics    ATTESTATION   As the responsible attending physician, I was present and directly participating in the entire procedure without a fellow.      Complications:   None    Stents/Implants:   Implants          Pacemaker          Pacemaker System, Micra [id number], With Introducer - [id number] - Implanted        Inventory item: PACEMAKER SYSTEM, MICRA [id number], WITH INTRODUCER Model/Cat number: [id number]    Serial number: [id number] : [employer]    Lot number: [id number] Device identifier: [id number]    Implant Date: 8/5/2025        GUDID Information       Request status Successful        Brand name: Micra™ AV2  Version/Model: [id number]    Company name: [employer] MRI safety info as of 8/5/25: MR Conditional    Contains dry or latex rubber: No      GMDN P.T. name: Intracardiac pacemaker                As of 8/5/2025       Status: Implanted                              Estimated Blood Loss:   15 mL    Anesthesia: Moderate Sedation Anesthesia Staff: No anesthesia staff entered.    Any Specimen(s) Removed:   No specimens collected during this procedure.      Electronically signed by: Timothy Bautista MD, 8/5/2025 5:40 PM

## 2025-08-05 NOTE — PROGRESS NOTES
08/05/25 1027   Physical Activity   On average, how many days per week do you engage in moderate to strenuous exercise (like a brisk walk)? 0 days   On average, how many minutes do you engage in exercise at this level? 0 min   Financial Resource Strain   How hard is it for you to pay for the very basics like food, housing, medical care, and heating? Not hard   Housing Stability   In the last 12 months, was there a time when you were not able to pay the mortgage or rent on time? N   In the past 12 months, how many times have you moved where you were living? 0   At any time in the past 12 months, were you homeless or living in a shelter (including now)? N   Transportation Needs   In the past 12 months, has lack of transportation kept you from medical appointments or from getting medications? no   In the past 12 months, has lack of transportation kept you from meetings, work, or from getting things needed for daily living? No   Food Insecurity   Within the past 12 months, you worried that your food would run out before you got the money to buy more. Never true   Within the past 12 months, the food you bought just didn't last and you didn't have money to get more. Never true   Stress   Do you feel stress - tense, restless, nervous, or anxious, or unable to sleep at night because your mind is troubled all the time - these days? Not at all   Social Connections   In a typical week, how many times do you talk on the phone with family, friends, or neighbors? Pt Declined  (pt declined to answer any further questions. pt said that she is too tired.)   How often do you get together with friends or relatives? Pt Declined   How often do you attend Synagogue or Hinduism services? Pt Declined   Do you belong to any clubs or organizations such as Synagogue groups, unions, fraternal or athletic groups, or school groups? Pt Declined   How often do you attend meetings of the clubs or organizations you belong to? Pt Declined   Are you  , , , , never , or living with a partner? Pt Declined   Intimate Partner Violence   Within the last year, have you been afraid of your partner or ex-partner? Pt Declined   Within the last year, have you been humiliated or emotionally abused in other ways by your partner or ex-partner? Pt Declined   Within the last year, have you been kicked, hit, slapped, or otherwise physically hurt by your partner or ex-partner? Pt Declined   Within the last year, have you been raped or forced to have any kind of sexual activity by your partner or ex-partner? Pt Declined   Alcohol Use   Q1: How often do you have a drink containing alcohol? Pt Declined   Q2: How many drinks containing alcohol do you have on a typical day when you are drinking? Pt Declined   Q3: How often do you have six or more drinks on one occasion? Pt Declined   Utilities   In the past 12 months has the electric, gas, oil, or water company threatened to shut off services in your home? Pt Declined   Health Literacy   How often do you need to have someone help you when you read instructions, pamphlets, or other written material from your doctor or pharmacy? Patient declines to respond   Follow-Ups   We make community resources available to all of our patients to assist with everyday needs. We may be able to connect you with those resources. Would you be interested? N

## 2025-08-05 NOTE — ED NOTES
Assumed care of pt. Pt resting without distress.  Continued bradycardia at 40-45 bpm.  Pt alert and oriented. Pending SDU bed.  Pt moved to inpt hospital bed for comfort.  Pt pleased and stats feels so much better.  Provided oral fluids, replaced panty liner and new purewick.  Call bell in reach and no distress noted.      Iris Hernandez RN  08/05/25 [id number]

## 2025-08-05 NOTE — ASSESSMENT & PLAN NOTE
Estrella Villafana is a 89 y.o. female with a history of hypertension who presents with predominant symptoms of dyspnea exertion and lightheadedness dizziness.  She has not had kirby syncope.  She was found to be in 2-1 atrioventricular block with ineffective rhythm at 40 bpm.  She has a right bundle branch block.  There is no history of coronary artery disease and she notes that she has never had an echocardiogram or a noninvasive stress test.  She is fairly active but noticed that she was becoming more dyspneic and lethargic over the past few days prior to her admission.  She does not take her blood pressure or heart rate at home on a regular basis.  She is compliant with her medications which do not include any AV freeman blocking agents.  She only takes amlodipine and losartan.     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  Her blood pressure is stable and actually hypertensive at this time.  I would like to recommend an echocardiogram with anticipation of a dual-chamber pacemaker implant tomorrow.  Please keep n.p.o. after midnight.  Increase losartan dosing.

## 2025-08-05 NOTE — PROGRESS NOTES
08/05/25 1029   Discharge Planning   Living Arrangements Alone   Support Systems Children   Type of Residence Private residence   Number of Stairs Within Residence 24   Do you have animals or pets at home? No   Who is requesting discharge planning? Provider   Home or Post Acute Services None   Expected Discharge Disposition Home   Does the patient need discharge transport arranged? No   Financial Resource Strain   How hard is it for you to pay for the very basics like food, housing, medical care, and heating? Not hard   Housing Stability   In the last 12 months, was there a time when you were not able to pay the mortgage or rent on time? N   At any time in the past 12 months, were you homeless or living in a shelter (including now)? N   Transportation Needs   In the past 12 months, has lack of transportation kept you from medical appointments or from getting medications? no   In the past 12 months, has lack of transportation kept you from meetings, work, or from getting things needed for daily living? No   Patient Choice   Patient / Family choosing to utilize agency / facility established prior to hospitalization No   Stroke Family Assessment   Stroke Family Assessment Needed No

## 2025-08-05 NOTE — PROGRESS NOTES
"Estrella Villafana is a 89 y.o. female on day 1 of admission presenting with cardia and atrioventricular block    Subjective   She was awake and alert, no acute distress  Her blood pressure has been elevated.  She was to receive losartan 100 mg last night but did not.  She has no acute complaints today.        Objective     Physical Exam  General: awake, alert, oriented, responsive  Cardiovascular: regular, normal S1 and S2  Lungs: good air entry bilaterally, clear to auscultation  Extremities: no peripheral cyanosis, no pedal edema  Neuro: alert, oriented x 3, no focal weakness      Last Recorded Vitals  Blood pressure (!) 191/62, pulse (!) 42, temperature 36.4 °C (97.5 °F), temperature source Temporal, resp. rate 20, height 1.626 m (5' 4\"), weight 71.7 kg (158 lb), SpO2 (!) 92%.  Intake/Output last 3 Shifts:  No intake/output data recorded.    Relevant Results  Lab Results   Component Value Date    WBC 4.4 08/05/2025    HGB 11.1 (L) 08/05/2025    HCT 34.2 (L) 08/05/2025     (H) 08/05/2025     (L) 08/05/2025     Scheduled medications  Scheduled Medications[1]  Continuous medications  Continuous Medications[2]  PRN medications  PRN Medications[3]      Assessment & Plan    Bradycardia with second-degree atrioventricular block  EP on consult  Cardiac monitoring     Hypertensive urgency  Resume losartan and amlodipine  Increase losartan to 100 mg daily  IV hydralazine for as needed for elevated systolic BP      Hyperlipidemia  On zetia    Plan  Continue losartan and amlodipine. She did not receive losartan last night. Recheck blood pressure after she gets meds.       Parris Mcneal MD         [1] amLODIPine, 5 mg, oral, Daily  busPIRone, 15 mg, oral, BID  calcium carbonate, 1,250 mg of calcium carbonate, oral, Daily  cetirizine, 10 mg, oral, Daily  cholecalciferol, 50 mcg, oral, Daily  enoxaparin, 40 mg, subcutaneous, q24h  ezetimibe, 10 mg, oral, Daily  ibuprofen, 600 mg, oral, q AM  losartan, 100 " mg, oral, Daily  traMADol, 50 mg, oral, Nightly  [2]    [3] PRN medications: acetaminophen **OR** acetaminophen **OR** acetaminophen, baclofen, hydrALAZINE, melatonin, ondansetron **OR** ondansetron, polyethylene glycol

## 2025-08-05 NOTE — PROGRESS NOTES
"Estrella Villafana is a 89 y.o. female on day 1 of admission presenting with Lightheaded.    Subjective   Patient underwent leadless pacemaker implant.       Objective     Physical Exam  Constitutional:       Appearance: Normal appearance.     Cardiovascular:      Rate and Rhythm: Normal rate and regular rhythm.      Heart sounds: No murmur heard.     No friction rub. No gallop.   Pulmonary:      Effort: Pulmonary effort is normal.      Breath sounds: Normal breath sounds.   Abdominal:      Palpations: Abdomen is soft.     Musculoskeletal:      Cervical back: Neck supple.     Neurological:      Mental Status: She is alert.     Psychiatric:         Mood and Affect: Mood normal.         Behavior: Behavior normal.         Last Recorded Vitals  Blood pressure (!) 190/103, pulse 70, temperature 36.9 °C (98.4 °F), resp. rate 14, height 1.626 m (5' 4\"), weight 71.7 kg (158 lb 1.1 oz), SpO2 95%.  Intake/Output last 3 Shifts:  No intake/output data recorded.    Relevant Results                            Assessment & Plan  Lightheaded    AV block, Mobitz 2    Estrella Villafana is a 89 y.o. female with a history of hypertension who presents with predominant symptoms of dyspnea exertion and lightheadedness dizziness.  She has not had kirby syncope.  She was found to be in 2-1 atrioventricular block with ineffective rhythm at 40 bpm.  She has a right bundle branch block.  There is no history of coronary artery disease and she notes that she has never had an echocardiogram or a noninvasive stress test.  She is fairly active but noticed that she was becoming more dyspneic and lethargic over the past few days prior to her admission.  She does not take her blood pressure or heart rate at home on a regular basis.  She is compliant with her medications which do not include any AV freeman blocking agents.  She only takes amlodipine and losartan.     8/4:    Atrioventricular block  The patient has second-degree 2 1 AV block with right bundle " branch block indicative of infrahisian conduction disease.  Her blood pressure is stable and actually hypertensive at this time.  I would like to recommend an echocardiogram with anticipation of a dual-chamber pacemaker implant tomorrow.  Please keep n.p.o. after midnight.  Increase losartan dosing.    8/5:    Patient underwent leadless pacemaker implantation. Bilateral femoral vein access were obtained for the procedure. During the procedure, she received 4000 units of heparin. After the patient arrived at her room, she was noted to have macroscopic hematuria. Her blood pressure is unchanged and no signs of abdominal pain. I will obtain a stat CBC, type and screen and place a consult to urology.          Timothy Bautista MD

## 2025-08-06 VITALS
HEIGHT: 64 IN | OXYGEN SATURATION: 96 % | TEMPERATURE: 97.2 F | RESPIRATION RATE: 20 BRPM | SYSTOLIC BLOOD PRESSURE: 150 MMHG | WEIGHT: 175.27 LBS | HEART RATE: 77 BPM | BODY MASS INDEX: 29.92 KG/M2 | DIASTOLIC BLOOD PRESSURE: 70 MMHG

## 2025-08-06 PROCEDURE — 2500000002 HC RX 250 W HCPCS SELF ADMINISTERED DRUGS (ALT 637 FOR MEDICARE OP, ALT 636 FOR OP/ED): Performed by: INTERNAL MEDICINE

## 2025-08-06 PROCEDURE — 2500000001 HC RX 250 WO HCPCS SELF ADMINISTERED DRUGS (ALT 637 FOR MEDICARE OP): Performed by: INTERNAL MEDICINE

## 2025-08-06 PROCEDURE — 97116 GAIT TRAINING THERAPY: CPT | Mod: GP

## 2025-08-06 PROCEDURE — 97161 PT EVAL LOW COMPLEX 20 MIN: CPT | Mod: GP

## 2025-08-06 PROCEDURE — 99231 SBSQ HOSP IP/OBS SF/LOW 25: CPT | Performed by: REGISTERED NURSE

## 2025-08-06 PROCEDURE — 99232 SBSQ HOSP IP/OBS MODERATE 35: CPT | Performed by: INTERNAL MEDICINE

## 2025-08-06 RX ORDER — AMLODIPINE BESYLATE 10 MG/1
10 TABLET ORAL DAILY
Status: DISCONTINUED | OUTPATIENT
Start: 2025-08-06 | End: 2025-08-06 | Stop reason: HOSPADM

## 2025-08-06 RX ORDER — LOSARTAN POTASSIUM 100 MG/1
100 TABLET ORAL DAILY
Qty: 90 TABLET | Refills: 0 | Status: SHIPPED | OUTPATIENT
Start: 2025-08-06

## 2025-08-06 RX ORDER — AMLODIPINE BESYLATE 10 MG/1
10 TABLET ORAL DAILY
Qty: 90 TABLET | Refills: 0 | Status: SHIPPED | OUTPATIENT
Start: 2025-08-06

## 2025-08-06 RX ADMIN — AMLODIPINE BESYLATE 10 MG: 10 TABLET ORAL at 08:58

## 2025-08-06 RX ADMIN — Medication 50 MCG: at 05:11

## 2025-08-06 RX ADMIN — IBUPROFEN 600 MG: 600 TABLET, FILM COATED ORAL at 09:01

## 2025-08-06 RX ADMIN — BUSPIRONE HYDROCHLORIDE 15 MG: 15 TABLET ORAL at 08:59

## 2025-08-06 RX ADMIN — EZETIMIBE 10 MG: 10 TABLET ORAL at 09:00

## 2025-08-06 RX ADMIN — CALCIUM 1 TABLET: 500 TABLET ORAL at 08:59

## 2025-08-06 RX ADMIN — CETIRIZINE HYDROCHLORIDE 10 MG: 10 TABLET, FILM COATED ORAL at 09:00

## 2025-08-06 RX ADMIN — LOSARTAN POTASSIUM 100 MG: 100 TABLET, FILM COATED ORAL at 09:00

## 2025-08-06 ASSESSMENT — COGNITIVE AND FUNCTIONAL STATUS - GENERAL
MOVING FROM LYING ON BACK TO SITTING ON SIDE OF FLAT BED WITH BEDRAILS: A LITTLE
STANDING UP FROM CHAIR USING ARMS: A LITTLE
CLIMB 3 TO 5 STEPS WITH RAILING: A LOT
MOBILITY SCORE: 17
TURNING FROM BACK TO SIDE WHILE IN FLAT BAD: A LITTLE
MOVING TO AND FROM BED TO CHAIR: A LITTLE
WALKING IN HOSPITAL ROOM: A LITTLE

## 2025-08-06 ASSESSMENT — PAIN - FUNCTIONAL ASSESSMENT: PAIN_FUNCTIONAL_ASSESSMENT: 0-10

## 2025-08-06 ASSESSMENT — ACTIVITIES OF DAILY LIVING (ADL): ADL_ASSISTANCE: INDEPENDENT

## 2025-08-06 ASSESSMENT — PAIN SCALES - GENERAL: PAINLEVEL_OUTOF10: 0 - NO PAIN

## 2025-08-06 NOTE — PROGRESS NOTES
"Estrella Villafana is a 89 y.o. female on day 2 of admission presenting with cardia and atrioventricular block    Subjective   She was admitted with symptomatic bradycardia with Mobitz type II second-degree atrioventricular block.  She underwent transcatheter insertion of a permanent leadless pacemaker in the right ventricular septum on 8/5 via femoral approach.  It was reported by nursing that she had blood in her urine receptacle that was being drained via a PureWick. She then had a harris catheter placed and the urine obtained in the bag was clear. She had had some bleeding from the groin sites with some blood on the sheets and it was thought that the blood was sucked by the PureWick into the urine receptacle and she did not have true hematuria.   She had no acute complaints today.  She reports feeling better.   The harris catheter remains in place this morning and the urine in the bag was clear.     Objective     Physical Exam  General: awake, alert, oriented, responsive  Cardiovascular: regular, normal S1 and S2  Lungs: good air entry bilaterally, clear to auscultation  Extremities: no peripheral cyanosis, no pedal edema. The right and left groin (femoral) sites were clean and dry without bleeding.  Neuro: alert, oriented x 3, no focal weakness      Last Recorded Vitals  Blood pressure 174/69, pulse 85, temperature 37.3 °C (99.1 °F), temperature source Temporal, resp. rate 20, height 1.626 m (5' 4\"), weight 79.5 kg (175 lb 4.3 oz), SpO2 92%.  Intake/Output last 3 Shifts:  I/O last 3 completed shifts:  In: 200 (2.5 mL/kg) [IV Piggyback:200]  Out: 465 (5.8 mL/kg) [Urine:450 (0.2 mL/kg/hr); Blood:15]  Weight: 79.5 kg     Relevant Results  Lab Results   Component Value Date    WBC 5.6 08/05/2025    HGB 12.0 08/05/2025    HCT 33.2 (L) 08/05/2025    MCV 92 08/05/2025     (L) 08/05/2025     Scheduled medications  Scheduled Medications[1]  Continuous medications  Continuous Medications[2]  PRN medications  PRN " Medications[3]      Assessment & Plan    Bradycardia with second-degree atrioventricular block  Status post transcatheter placement of a permanent leadless pacemaker in the right ventricular septum  Cardiac monitoring     Hypertensive urgency  BP improved, then again elevated this morning.   Continue losartan and amlodipine  IV hydralazine for as needed for elevated systolic BP      Hyperlipidemia  On zetia    Plan  Continue losartan and amlodipine.  Increase amlodipine to 10 mg daily.  Remove Sutherland catheter.  PT/OT  Discharge planning.    Parris Mcneal MD           [1] amLODIPine, 5 mg, oral, Daily  busPIRone, 15 mg, oral, BID  calcium carbonate, 1,250 mg of calcium carbonate, oral, Daily  cetirizine, 10 mg, oral, Daily  cholecalciferol, 50 mcg, oral, Daily  enoxaparin, 40 mg, subcutaneous, q24h  ezetimibe, 10 mg, oral, Daily  ibuprofen, 600 mg, oral, q AM  losartan, 100 mg, oral, Daily  traMADol, 50 mg, oral, Nightly     [2]    [3] PRN medications: acetaminophen **OR** acetaminophen **OR** acetaminophen, baclofen, hydrALAZINE, melatonin, ondansetron **OR** ondansetron, polyethylene glycol

## 2025-08-06 NOTE — DISCHARGE SUMMARY
Discharge Diagnosis  Mobitz 2 second-degree atrioventricular block  Hypertension with hypertensive urgency    Issues Requiring Follow-Up      Discharge Meds     Medication List      CHANGE how you take these medications     amLODIPine 10 mg tablet; Commonly known as: Norvasc; Take 1 tablet (10   mg) by mouth once daily.; What changed: medication strength, how much to   take   losartan 100 mg tablet; Commonly known as: Cozaar; Take 1 tablet (100   mg) by mouth once daily.; What changed: medication strength, how much to   take     CONTINUE taking these medications     acetaminophen 325 mg tablet; Commonly known as: Tylenol   baclofen 10 mg tablet; Commonly known as: Lioresal; Take 1 tablet (10   mg) by mouth once daily as needed for muscle spasms.   busPIRone 15 mg tablet; Commonly known as: Buspar; Take 1 tablet (15 mg)   by mouth 2 times a day.   CALTRATE 600 ORAL   CENTRUM SILVER ORAL   ciclopirox 1 % shampoo   ezetimibe 10 mg tablet; Commonly known as: Zetia; Take 1 tablet (10 mg)   by mouth once daily.   ibuprofen 200 mg tablet   loratadine 10 mg tablet; Commonly known as: Claritin   PreserVision AREDS 4,296 mcg-226 mg-90 mg capsule; Generic drug:   vitamins A,C,E-zinc-copper   traMADol 50 mg tablet; Commonly known as: Ultram; Take 1 tablet (50 mg)   by mouth 2 times a day as needed for severe pain (7 - 10). Do not fill   before July 24, 2025.   Vitamin D3 50 mcg (2,000 units) capsule; Generic drug: cholecalciferol       Test Results Pending At Discharge  Pending Labs       Order Current Status    Extra Urine Gray Tube In process    Urinalysis with Reflex Culture and Microscopic In process            Hospital Course  89-year-old female patient with a history of hypertension presented to the emergency department at [facility] because of easy fatigability and dyspnea on exertion which she had been experiencing for the past 2 weeks.  She was bradycardic with a heart rate of about 40/min and a 12-lead EKG showed a  Mobitz 2 second-degree AV block.  She also had elevated blood pressures as high as 213/64  She was admitted, antihypertensive medication was adjusted with a dose of losartan doubled.  The dose of amlodipine was later doubled as well.  She underwent transcatheter placement of a permanent leadless pacemaker in the right ventricular septum on 8/5.  She was stable postprocedure.  She is cleared for discharge by the EP service.   Her blood pressure has improved.  She is being discharged home on an increased dose of amlodipine and losartan.  She will follow up with Dr Arriaga in the office and she will also follow up with her primary physician.       Outpatient Follow-Up  Future Appointments   Date Time Provider Department Center   8/19/2025 12:00 PM VIVIANA [id number] [facility] [id number] [facility]   8/20/2025 10:30 AM Rasheed Blas DO [id number] [facility]   9/5/2025 11:00 AM Princess Valverde MD PhD [id number] [facility]   10/16/2025 11:45 AM Gely Erazo, APRN-CNP [id number] [facility]         Parris Mcneal MD

## 2025-08-06 NOTE — PROGRESS NOTES
Spiritual Care Visit  Spiritual Care Request    Reason for Visit:  Routine Visit: Follow-up     Request Received From:       Focus of Care:  Visited With: Patient         Refer to :          Spiritual Care Assessment    Spiritual Assessment:                      Care Provided:       Sense of Community and or Orthodox Affiliation:  Mandaeism   Values/Beliefs  Spiritual Requests During Hospitalization: Estrella asked for Communion.     Addressed Needs/Concerns and/or Yahaira Through:     Sacramental Encounters  Communion: Patient wants communion  Communion Given Indicator: Yes    Outcome:        Advance Directives:         Spiritual Care Annotation    Annotation:  Estrella asked for Communion.  Brian Justice

## 2025-08-06 NOTE — CARE PLAN
The patient's goals for the shift include      The clinical goals for the shift include admission, blood pressure control      Problem: Pain - Adult  Goal: Verbalizes/displays adequate comfort level or baseline comfort level  8/5/2025 2248 by Penny Gordon RN  Flowsheets (Taken 8/5/2025 2247)  Verbalizes/displays adequate comfort level or baseline comfort level:   Encourage patient to monitor pain and request assistance   Assess pain using appropriate pain scale   Administer analgesics based on type and severity of pain and evaluate response   Implement non-pharmacological measures as appropriate and evaluate response   Consider cultural and social influences on pain and pain management   Notify Licensed Independent Practitioner if interventions unsuccessful or patient reports new pain  8/5/2025 2247 by Penny oGrdon RN  Flowsheets (Taken 8/5/2025 2247)  Verbalizes/displays adequate comfort level or baseline comfort level:   Encourage patient to monitor pain and request assistance   Assess pain using appropriate pain scale   Administer analgesics based on type and severity of pain and evaluate response   Implement non-pharmacological measures as appropriate and evaluate response   Consider cultural and social influences on pain and pain management   Notify Licensed Independent Practitioner if interventions unsuccessful or patient reports new pain     Problem: Safety - Adult  Goal: Free from fall injury  Flowsheets (Taken 8/5/2025 2248)  Free from fall injury:   Instruct family/caregiver on patient safety   Based on caregiver fall risk screen, instruct family/caregiver to ask for assistance with transferring infant if caregiver noted to have fall risk factors     Problem: Discharge Planning  Goal: Discharge to home or other facility with appropriate resources  Flowsheets (Taken 8/5/2025 2248)  Discharge to home or other facility with appropriate resources:   Identify barriers to discharge with patient and  caregiver   Arrange for needed discharge resources and transportation as appropriate   Identify discharge learning needs (meds, wound care, etc)   Arrange for interpreters to assist at discharge as needed   Refer to discharge planning if patient needs post-hospital services based on physician order or complex needs related to functional status, cognitive ability or social support system     Problem: Chronic Conditions and Co-morbidities  Goal: Patient's chronic conditions and co-morbidity symptoms are monitored and maintained or improved  Flowsheets (Taken 8/5/2025 3456)  Care Plan - Patient's Chronic Conditions and Co-Morbidity Symptoms are Monitored and Maintained or Improved:   Monitor and assess patient's chronic conditions and comorbid symptoms for stability, deterioration, or improvement   Collaborate with multidisciplinary team to address chronic and comorbid conditions and prevent exacerbation or deterioration   Update acute care plan with appropriate goals if chronic or comorbid symptoms are exacerbated and prevent overall improvement and discharge

## 2025-08-06 NOTE — PROGRESS NOTES
Physical Therapy    Physical Therapy Evaluation & Treatment    Patient Name: Estrella Villafana  MRN: [id number]  Department: [address]  Room: 16/16A  Today's Date: 8/6/2025   Time Calculation  Start Time: 1346  Stop Time: 1410  Time Calculation (min): 24 min    Assessment/Plan   PT Assessment  PT Assessment Results: Decreased strength, Decreased endurance, Impaired balance, Decreased mobility, Decreased coordination, Impaired judgement  Rehab Prognosis: Good  Barriers to Discharge Home: Physical needs  Physical Needs: Stair navigation into home limited by function/safety, Intermittent mobility assistance needed, Intermittent ADL assistance needed  Evaluation/Treatment Tolerance: Patient limited by fatigue, Patient tolerated treatment well  Medical Staff Made Aware: Yes  Barriers to Participation: Comorbidities  End of Session Communication: Bedside nurse  Assessment Comment: pt would benefit from skilled therapy services to increase strength and balance and improve functional mobility with 24/7 Assistance for safety. Recommend use of RW at this time for improved stability  End of Session Patient Position: Up in chair, Alarm on (call button in reach)  IP OR SWING BED PT PLAN  Inpatient or Swing Bed: Inpatient  PT Plan  Treatment/Interventions: Bed mobility, Transfer training, Gait training, Stair training, Strengthening, Endurance training, Therapeutic exercise, Balance training  PT Plan: Ongoing PT  PT Frequency: 5 times per week (during this acute inpatient hospitalization)  PT Discharge Recommendations: Low intensity level of continued care, 24 hr supervision due to cognition  Equipment Recommended upon Discharge: Wheeled walker  PT Recommended Transfer Status:  (MIN A)  PT - OK to Discharge: Yes    Subjective     PT Visit Info:  PT Received On: 08/06/25  General Visit Information:  General  Reason for Referral: pt is a 88 y/o female admitted with lightheadedness, DUCKWORTH and fatigue; pt found to have bradycardia with a 2-1  AV block. pt is s/p transcatheter insertion of permanent leadless pacemaker via femoral approach 8/5/2025; impaired mobility  Referred By: Dr. Mcneal  Past Medical History Relevant to Rehab: HTN, HLD; anxiety; OA; IBS; gout; spinal stenosis; DM; anemia; cellulitis  Family/Caregiver Present: Yes  Caregiver Feedback: pts daughter present to provide information  Prior to Session Communication: Bedside nurse  Patient Position Received: Bed, 3 rail up, Alarm on (call button in reach)  General Comment: RN cleared pt for therapy. pt alert and cooperative; willing to work with therapy      Home Living:  Home Living  Type of Home: House  Lives With: Alone  Home Adaptive Equipment:  (hurry-cane and rollator)  Home Layout: One level, Laundry in basement  Home Access:  (2 entry stairs with 1 railing)  Bathroom Shower/Tub: Walk-in shower  Bathroom Toilet: Handicapped height  Bathroom Equipment: Grab bars in shower  Home Living Comments: pt has an upstairs - BUT DOES NOT USE; pt also has 12 stairs with 1 railing to a basement laundry. (*but pts daughter can assist with laundry)    Prior Level of Function:  Prior Function Per Pt/Caregiver Report  Level of [place]: Independent with ADLs and functional transfers, Independent with homemaking with ambulation  Receives Help From:  (adult son and daughter)  ADL Assistance: Independent  Homemaking Assistance: Independent  Ambulatory Assistance: Independent (uses hurry-cane vs rollator)  Vocational: Retired  Prior Function Comments: pt reported being active and driving in community. (was attending Outpatient PT)    Precautions:  Precautions  Hearing/Visual Limitations: wears glasses; mild [place]  Medical Precautions: Fall precautions  Precautions Comment: educated pt on energy conservation techniques and  during mobility       Vital Signs Comment: pt on room air; SaO2 at 92-98%; HR 78 bpm     Objective   Pain:  Pain Assessment  Pain Assessment:   (0/10)  Cognition:  Cognition  Overall Cognitive Status: Within Functional Limits  Orientation Level: Oriented X4  Safety/Judgement:  (impaired safety awareness)  Insight: Mild    General Assessments:          Activity Tolerance  Endurance:  (GOOD-/FAIR+ activity tolerance)    Sensation  Sensation Comment: denies any numbness/tingling    Coordination  Coordination Comment: short B step length    Postural Control  Posture Comment: kyphotic posture    Static Sitting Balance  Static Sitting-Comment/Number of Minutes: GOOD  Dynamic Sitting Balance  Dynamic Sitting-Comments: GOOD    Static Standing Balance  Static Standing-Comment/Number of Minutes: GOOD-/FAIR+  Dynamic Standing Balance  Dynamic Standing-Comments: FAIR+      Functional Assessments:  Bed Mobility  Bed Mobility:  (supine to sit with MIN A for trunk up, scooting and B LE. increased time and effort noted. pt required seated rest break due to mild fatigue)      Treatment:     Transfers  Transfer:  (sit <-> stand with MIN A. VC for safe hand placement. mild postural sway initially upon standing. pt able to regain COG)    Ambulation/Gait Training  Ambulation/Gait Training Performed:  (pt amb 20' x 1 using rolling walker with MIN A. pt presented with slow joel, short B step length and mild lateral sway. no major LOB noted. pt trying to abandon RW to sit to soon. VC given for safety. mild fatigue noted. Educated pt on energy conservation techniques       Extremity/Trunk Assessments:  RUE   RUE :  (WFL with 3+/5 strength)  LUE   LUE:  (WFL with 3+/5 strength)  RLE   RLE :  (WFL with 3+/5 strength)  LLE   LLE :  (WFL with 3+/5 strength)    Outcome Measures:  [facility] Basic Mobility  Turning from your back to your side while in a flat bed without using bedrails: A little  Moving from lying on your back to sitting on the side of a flat bed without using bedrails: A little  Moving to and from bed to chair (including a wheelchair): A little  Standing up from a chair  using your arms (e.g. wheelchair or bedside chair): A little  To walk in hospital room: A little  Climbing 3-5 steps with railing: A lot  Basic Mobility - Total Score: 17    Encounter Problems       Encounter Problems (Active)       Mobility       STG - Patient will ambulate 75' x 1 using rolling walker with MOD INDEPENDENT (Progressing)       Start:  08/06/25    Expected End:  08/20/25            STG - Patient will negotiate 2 stairs using 1 railing with SBA (Progressing)       Start:  08/06/25    Expected End:  08/20/25               PT Transfers       STG - Patient to transfer to and from sit to supine with MOD INDEPENDENT (Progressing)       Start:  08/06/25    Expected End:  08/20/25            STG - Patient will transfer sit to and from stand with MOD INDEPENDENT (Progressing)       Start:  08/06/25    Expected End:  08/20/25                   Education Documentation  Precautions, taught by Kenedll Shipley, PT at 8/6/2025  3:26 PM.  Learner: Patient  Readiness: Eager  Method: Explanation  Response: Verbalizes Understanding, Needs Reinforcement  Comment:     Body Mechanics, taught by Kendell Shipley, PT at 8/6/2025  3:26 PM.  Learner: Patient  Readiness: Eager  Method: Explanation  Response: Verbalizes Understanding, Needs Reinforcement  Comment:     Mobility Training, taught by Kendell Shipley, PT at 8/6/2025  3:26 PM.  Learner: Patient  Readiness: Eager  Method: Explanation  Response: Verbalizes Understanding, Needs Reinforcement  Comment:     Education Comments  No comments found.

## 2025-08-06 NOTE — DISCHARGE INSTRUCTIONS
No heavy lifting > 5 lbs for 5 days   may drive Sunday, please call with any cahnges, ecchymosis, new pain, fever

## 2025-08-06 NOTE — PROGRESS NOTES
Subjective Data:  No complaints, received Micra device yesterday currently in sinus with V pacing    Overnight Events:    None     Objective Data:  Last Recorded Vitals:  Vitals:    08/06/25 1126 08/06/25 1200 08/06/25 1553 08/06/25 1600   BP: 134/58  150/70 150/70   BP Location: Right arm  Right arm    Patient Position: Lying  Lying    Pulse: 72 74 82 77   Resp: 22 15 18 20   Temp: 36.4 °C (97.5 °F)  36.2 °C (97.2 °F)    TempSrc: Temporal  Temporal    SpO2: 93%  96%    Weight:       Height:           Last Labs:  CBC - 8/5/2025:  6:30 PM  5.6 12.0 137    33.2      CMP - 8/5/2025:  5:37 AM  8.7 7.0 19 --- 0.5   _ 3.8 19 53      PTT - No results in last year.  _   _ _     TROPHS   Date/Time Value Ref Range Status   08/04/2025 02:43 PM 22 0 - 13 ng/L Final   08/04/2025 01:50 PM 24 0 - 13 ng/L Final     HGBA1C   Date/Time Value Ref Range Status   03/03/2025 11:26 AM 6.1 4.2 - 6.5 % Final   10/23/2024 11:28 AM 5.6 See comment % Final   04/15/2024 11:12 AM 5.6 4.2 - 6.5 % Final     LDLCALC   Date/Time Value Ref Range Status   10/23/2024 11:28  <=99 mg/dL Final     Comment:                                 Near   Borderline      AGE      Desirable  Optimal    High     High     Very High     0-19 Y     0 - 109     ---    110-129   >/= 130     ----    20-24 Y     0 - 119     ---    120-159   >/= 160     ----      >24 Y     0 -  99   100-129  130-159   160-189     >/=190     04/25/2024 09:49  65 - 130 mg/dL Final   08/11/2023 11:26  65 - 130 MG/DL Final     VLDL   Date/Time Value Ref Range Status   10/23/2024 11:28 AM 20 0 - 40 mg/dL Final      Last I/O:  I/O last 3 completed shifts:  In: 200 (2.5 mL/kg) [IV Piggyback:200]  Out: 465 (5.8 mL/kg) [Urine:450 (0.2 mL/kg/hr); Blood:15]  Weight: 79.5 kg     Past Cardiology Tests (Last 3 Years):  EKG:  ECG 12 lead 08/04/2025 (Preliminary)      ECG 12 Lead     Echo:  Transthoracic Echo Complete 08/05/2025    Ejection Fractions:  EF   Date/Time Value Ref Range Status    08/05/2025 12:50 PM 68 %      Cath:  No results found for this or any previous visit from the past 1095 days.    Stress Test:  No results found for this or any previous visit from the past 1095 days.    Cardiac Imaging:  No results found for this or any previous visit from the past 1095 days.      Inpatient Medications:  Scheduled Medications[1]  PRN Medications[2]  Continuous Medications[3]    Physical Exam:  Gen: A+O x 3, no acute distress  HEENT: Normocephalic/atraumatic pupils equal react light  Neck: No JVD, upstrokes and volumes nl, no bruits   Lung: CTA, nl AP diameter  CV:  nl sounding S1, S2, no murmur, PMI nondisplaced  Abdomen: soft, non tender, + BS x 4   Extremities: warm to touch, palpable pulses bilaterally, no edema groin sites intact with dressing small amount of dried blood at right side  Neuro: no neurologic deficits     Assessment/Plan   89-year-old female with symptomatic Mobitz 2/high degree block received Micra device yesterday.  Patient  doing well, native A with B pacing, blood pressure elevated, losartan increased, laboratory studies yesterday with potassium 3.4 supplemented with 40 mill equivalents x 1  Device interrogation normal  Appointment made for follow-up for device in 2 weeks I hope        Peripheral IV 08/04/25 20 G Left Antecubital (Active)   Site Assessment Clean;Dry;Intact 08/05/25 2100   Dressing Status Clean;Dry 08/05/25 2100   Number of days: 2       Code Status:  Full Code    I spent 35 minutes in the professional and overall care of this patient.        Heather Barlow, APRN-CNP       [1]   Scheduled medications   Medication Dose Route Frequency    amLODIPine  10 mg oral Daily    busPIRone  15 mg oral BID    calcium carbonate  1,250 mg of calcium carbonate oral Daily    cetirizine  10 mg oral Daily    cholecalciferol  50 mcg oral Daily    enoxaparin  40 mg subcutaneous q24h    ezetimibe  10 mg oral Daily    ibuprofen  600 mg oral q AM    losartan  100 mg oral Daily     traMADol  50 mg oral Nightly   [2]   PRN medications   Medication    acetaminophen    Or    acetaminophen    Or    acetaminophen    baclofen    hydrALAZINE    melatonin    ondansetron    Or    ondansetron    polyethylene glycol   [3]   Continuous Medications   Medication Dose Last Rate

## 2025-08-06 NOTE — PROGRESS NOTES
08/06/25 1435   Discharge Planning   Who is requesting discharge planning? Provider   Home or Post Acute Services None   Expected Discharge Disposition Home   Does the patient need discharge transport arranged? No     Pt has pacemaker placed. Poss dc today. Pt requested to continue outpatient PT. Pt will need a script for outpatient PT at time of discharge. Requested Dr. Mcneal to write script.     DISCHARGE PLAN TO RETURN HOME WITH OUTPATIENT SCRIPT FOR PT WHEN MEDICALLY CLEAR.

## 2025-08-07 ENCOUNTER — PATIENT OUTREACH (OUTPATIENT)
Dept: PRIMARY CARE | Facility: CLINIC | Age: 89
End: 2025-08-07
Payer: MEDICARE

## 2025-08-07 ENCOUNTER — TELEPHONE (OUTPATIENT)
Dept: PRIMARY CARE | Facility: CLINIC | Age: 89
End: 2025-08-07
Payer: MEDICARE

## 2025-08-07 NOTE — TELEPHONE ENCOUNTER
This is a new patient to Dr. Valverde. New patient appointment scheduled for 9/5/2025. No sooner availability for new patient/hospital discharge follow up prior to requested date of 8/19/2025. Please advise.

## 2025-08-07 NOTE — PROGRESS NOTES
Discharge Facility: [facility]  Discharge Diagnosis: Mobitz 2 second-degree atrioventricular block  Hypertension with hypertensive urgency  Admission Date: 08/05/2025  Discharge Date: 08/06/2025    PCP Appointment Date: New patient 09/05/2025, tasked to office  Specialist Appointment Date: Pain Med 10/16/2025, Derm 11/13/2025  Hospital Encounter and Summary Linked: Yes  ED to Hosp-Admission (Discharged) with Parris Mcneal MD; Damian Ruffin MD (08/04/2025)     See discharge assessment below for further details    Wrap Up  Wrap Up Additional Comments: CM spoke to Lakhwinder via phone. She states that her mother is doing okay at home. She did have a fall at home without injury this morning. Lakhwinder will be staying at the home with the patient. No new scripts were given at discharge. She has this CM's contact information and is encouraged to call with any questions. She was very thankful for this call. (8/7/2025 11:42 AM)    Medications  Medications reviewed with patient/caregiver?: Yes (8/7/2025 11:42 AM)  Is the patient having any side effects they believe may be caused by any medication additions or changes?: No (8/7/2025 11:42 AM)  Does the patient have all medications ordered at discharge?: Not applicable (8/7/2025 11:42 AM)  Prescription Comments: No new scripts given at discharge (8/7/2025 11:42 AM)  Is the patient taking all medications as directed (includes completed medication regime)?: Yes (8/7/2025 11:42 AM)  Medication Comments: Lakhwinder dnies any issues obtaining or affording medication (8/7/2025 11:42 AM)    Appointments  Does the patient have a primary care provider?: Yes (8/7/2025 11:42 AM)  Has the patient kept scheduled appointments due by today?: Yes (8/7/2025 11:42 AM)    Self Management  What is the home health agency?: N/A (8/7/2025 11:42 AM)  What Durable Medical Equipment (DME) was ordered?: N/A (8/7/2025 11:42 AM)    Patient Teaching  Does the patient have access to their  discharge instructions?: Yes (8/7/2025 11:42 AM)  Care Management Interventions: Reviewed instructions with patient (8/7/2025 11:42 AM)  What is the patient's perception of their health status since discharge?: Improving (8/7/2025 11:42 AM)  Is the patient/caregiver able to teach back the hierarchy of who to call/visit for symptoms/problems? PCP, Specialist, Home Health nurse, Urgent Care, ED, 911: Yes (8/7/2025 11:42 AM)

## 2025-08-07 NOTE — TELEPHONE ENCOUNTER
Discharge Facility: [facility]  Discharge Diagnosis: Mobitz 2 second-degree atrioventricular block  Hypertension with hypertensive urgency  Discharge Date: 08/06/2025       Patient is scheduled for a New patient Appointment on 09/05/2025. She will need to be seen on or before 08/19/2025 to qualify for TCM billing.  Please have office staff reach out to patient and schedule an appointment within 14 days from discharge date.

## 2025-08-08 ENCOUNTER — OFFICE VISIT (OUTPATIENT)
Dept: URGENT CARE | Age: 89
End: 2025-08-08
Payer: MEDICARE

## 2025-08-08 ENCOUNTER — ANCILLARY PROCEDURE (OUTPATIENT)
Dept: URGENT CARE | Age: 89
End: 2025-08-08
Payer: MEDICARE

## 2025-08-08 VITALS
OXYGEN SATURATION: 95 % | SYSTOLIC BLOOD PRESSURE: 130 MMHG | TEMPERATURE: 98.2 F | DIASTOLIC BLOOD PRESSURE: 68 MMHG | RESPIRATION RATE: 19 BRPM | HEART RATE: 82 BPM

## 2025-08-08 DIAGNOSIS — T14.90XA INJURY: ICD-10-CM

## 2025-08-08 PROCEDURE — 3078F DIAST BP <80 MM HG: CPT | Performed by: PHYSICIAN ASSISTANT

## 2025-08-08 PROCEDURE — 3075F SYST BP GE 130 - 139MM HG: CPT | Performed by: PHYSICIAN ASSISTANT

## 2025-08-08 PROCEDURE — 1111F DSCHRG MED/CURRENT MED MERGE: CPT | Performed by: PHYSICIAN ASSISTANT

## 2025-08-08 PROCEDURE — 73130 X-RAY EXAM OF HAND: CPT | Mod: LEFT SIDE | Performed by: PHYSICIAN ASSISTANT

## 2025-08-08 PROCEDURE — 99213 OFFICE O/P EST LOW 20 MIN: CPT | Performed by: PHYSICIAN ASSISTANT

## 2025-08-08 PROCEDURE — 1159F MED LIST DOCD IN RCRD: CPT | Performed by: PHYSICIAN ASSISTANT

## 2025-08-08 PROCEDURE — 1036F TOBACCO NON-USER: CPT | Performed by: PHYSICIAN ASSISTANT

## 2025-08-08 NOTE — PROGRESS NOTES
Subjective   Patient ID: Estrella Villafana is a 89 y.o. female. They present today with a chief complaint of Injury (Just had pacemaker put in and started walking yesterday and fell and hurt left hand/wrist/Left arm is sore but she's been moving it).    History of Present Illness  Allergies: Reviewed.   Past medical history: Reviewed.   Past surgical history Reviewed.   Social history: Reviewed.    HPI: Patient is a very pleasant 89-year-old white female, past medical history of type II heart block status post pacemaker 4 days ago, hypertension, hyperlipidemia, presenting to the clinic for chief complaint of fall.  Patient is she was recently discharged from the hospital.  States she went up to go to the bathroom and her rollator got stuck on the carpet causing her to fall landing on her left hand.  She presenting out of concern for pain and swelling associated with the left hand.  She does note she was lying on the hand for couple of hours before her daughter was able to get to her house and help her up.  She denies any numbness or tingling.  No bruising.  She denies any other injuries.  Denies any use of blood thinners.  She denies any associated head injury or loss of consciousness.      Injury      Past Medical History  Allergies as of 08/08/2025 - Reviewed 08/08/2025   Allergen Reaction Noted    Atorvastatin Other and Myalgia 09/15/2023    Lactose GI Upset 09/15/2023    Dexamethasone Myalgia 09/15/2023    Lisinopril Myalgia and Unknown 09/15/2023    Triamterene-hydrochlorothiazid Myalgia 09/15/2023    Cephalexin Diarrhea 09/15/2023       Prescriptions Prior to Admission[1]       Medical History[2]    Surgical History[3]     reports that she has never smoked. She has never used smokeless tobacco. Alcohol use questions deferred to the physician. She reports that she does not use drugs.    Review of Systems  Review of Systems                               Objective    Vitals:    08/08/25 1106   BP: 130/68   Pulse: 82    Resp: 19   Temp: 36.8 °C (98.2 °F)   SpO2: 95%     No LMP recorded. Patient is postmenopausal.    Physical Exam  General: Vitals noted, no distress. Afebrile.     EENT: TMs clear. Eyes unremarkable. Posterior oropharynx unremarkable.     Cardiac: Regular, rate, rhythm, no murmur.     Pulmonary: Lungs clear bilaterally with good aeration. No adventitious breath sounds.     Abdomen: Soft, nontender, nonsurgical. No peritoneal signs. Normoactive bowel sounds.     Extremities: No peripheral edema. Negative Homans bilaterally, no cords. Exam of the left hand shows a small amount of diffuse swelling throughout the hand and fingers.  There is no focal bony point tenderness throughout.  There is no overlying skin changes including erythema induration warmth or ecchymosis.. The skin is intact. Is neurovascularly intact distally. Specifically, has full strength with flexion and extension of the digits. Is nontender over the wrist. Remainder the extremity is nontender.     Skin: No rash.     Neuro: No focal neurologic deficits.   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     MACRO: None   Signed by: Davy Kerns 8/8/2025 12:04 PM Dictation workstation:   [id number]      Cardiology, Vascular, and Other Imaging  No other imaging results found for the past 2 days      Diagnostic study results (if any) were reviewed by Rob Etienne PA-C.    Assessment/Plan   Allergies, medications, history, and pertinent labs/EKGs/Imaging reviewed by Rob Etienne PA-C.     Medical Decision Making  Patient was seen about the clinic for complaint of left hand injury.  On exam patient is nontoxic well-appearing respite comfortably no acute distress.  Vital signs are stable, afebrile.  Chest clear, heart is regular, belly is diffusely soft and nontender.  Evaluation of  "the left hand as above most concerning for a sprain.  X-ray imaging was obtained which reveals no underlying acute osseous abnormalities throughout the hand or wrist.  Patient will be advised supportive care measures at home including plenty of fluids, rest, ice, and elevation.  Advised Tylenol as needed for pain.  She be discharged home at this time instructed follow-up with her primary care physician in the next week.  Reviewed my impression, plan, strict return versus report to ED precautions with the patient and daughter at bedside.  Both expressed understanding and agreement with plan of care.      Orders and Diagnoses  Diagnoses and all orders for this visit:  Injury  -     XR hand left 3+ views; Future        Medical Admin Record      Follow Up Instructions  No follow-ups on file.    Patient disposition: Home    Electronically signed by Rob Etienne PA-C  12:14 PM         [1] (Not in a hospital admission)   [2]   Past Medical History:  Diagnosis Date    Anxiety     Cellulitis of finger of left hand 09/15/2023    Hyperglycemia     Hyperlipidemia     Hypertension     Lactose intolerance     Lumbar spinal stenosis     Osteoarthritis     Sciatica    [3]   Past Surgical History:  Procedure Laterality Date    ADENOIDECTOMY      CARDIAC ELECTROPHYSIOLOGY PROCEDURE N/A 8/5/2025    Procedure: PPM Leadless Implant;  Surgeon: Timothy Amos MD;  Location: [facility] Cardiac Cath Lab;  Service: Electrophysiology;  Laterality: N/A;    CHOLECYSTECTOMY  08/28/2014    ROBOTIC SINGLE PORT LAPAROSCOPIC    OTHER SURGICAL HISTORY      Spinal implant    SPINE SURGERY  08/23/2022    \"TUBE IN SPINE\"    TONSILLECTOMY      WISDOM TOOTH EXTRACTION       "

## 2025-08-09 LAB
ATRIAL RATE: 84 BPM
P AXIS: 62 DEGREES
P OFFSET: 177 MS
P ONSET: 115 MS
PR INTERVAL: 214 MS
Q ONSET: 222 MS
QRS COUNT: 8 BEATS
QRS DURATION: 134 MS
QT INTERVAL: 626 MS
QTC CALCULATION(BAZETT): 528 MS
QTC FREDERICIA: 560 MS
R AXIS: 75 DEGREES
T AXIS: 45 DEGREES
T OFFSET: 535 MS
VENTRICULAR RATE: 43 BPM

## 2025-08-15 DIAGNOSIS — I44.1 AV BLOCK, MOBITZ 2: ICD-10-CM

## 2025-08-15 DIAGNOSIS — R42 LIGHTHEADED: Primary | ICD-10-CM

## 2025-08-19 ENCOUNTER — TELEPHONE (OUTPATIENT)
Dept: CARDIOLOGY | Facility: CLINIC | Age: 89
End: 2025-08-19
Payer: MEDICARE

## 2025-08-19 ENCOUNTER — ANCILLARY PROCEDURE (OUTPATIENT)
Facility: CLINIC | Age: 89
End: 2025-08-19
Payer: MEDICARE

## 2025-08-19 DIAGNOSIS — I44.1 AV BLOCK, MOBITZ 2: ICD-10-CM

## 2025-08-19 PROCEDURE — 93288 INTERROG EVL PM/LDLS PM IP: CPT | Performed by: INTERNAL MEDICINE

## 2025-08-19 PROCEDURE — 93288 INTERROG EVL PM/LDLS PM IP: CPT

## 2025-08-20 ENCOUNTER — APPOINTMENT (OUTPATIENT)
Dept: PRIMARY CARE | Facility: CLINIC | Age: 89
End: 2025-08-20
Payer: MEDICARE

## 2025-08-21 ENCOUNTER — TELEPHONE (OUTPATIENT)
Facility: CLINIC | Age: 89
End: 2025-08-21
Payer: MEDICARE

## 2025-08-29 ENCOUNTER — APPOINTMENT (OUTPATIENT)
Dept: WOUND CARE | Facility: HOSPITAL | Age: 89
End: 2025-08-29
Payer: MEDICARE

## 2025-09-02 DIAGNOSIS — M47.812 SPONDYLOSIS WITHOUT MYELOPATHY OR RADICULOPATHY, CERVICAL REGION: Primary | ICD-10-CM

## 2025-09-02 RX ORDER — TRAMADOL HYDROCHLORIDE 50 MG/1
50 TABLET, FILM COATED ORAL 2 TIMES DAILY PRN
Qty: 60 TABLET | Refills: 0 | Status: SHIPPED | OUTPATIENT
Start: 2025-09-02 | End: 2025-10-02

## 2025-09-05 ENCOUNTER — APPOINTMENT (OUTPATIENT)
Dept: PRIMARY CARE | Facility: CLINIC | Age: 89
End: 2025-09-05
Payer: MEDICARE

## 2025-09-12 NOTE — DOCUMENTATION CLARIFICATION NOTE
PATIENT:               DANNI BANGURA  ACCT #:                  [id number]  MRN:                       [id number]  :                       1935  ADMIT DATE:       2025 1:34 PM  DISCH DATE:        2025 6:48 PM  RESPONDING PROVIDER #:        [id number]          PROVIDER RESPONSE TEXT:    This pt was enrolled in a clinical evidence development research study    CDI QUERY TEXT:    Clarification    Instruction:    Based on your assessment of the patient and the clinical information, please provide the requested documentation by clicking on the appropriate radio button and enter any additional information if prompted.    Question: Can you further clarify if this pt was enrolled in a coverage with evidence development research study for the pt?s leadless pacemaker?    When answering this query, please exercise your independent professional judgment. The fact that a question is being asked, does not imply that any particular answer is desired or expected.    The patient's clinical indicators include:  Clinical Information:  This pt is a 89 y.o. female with a history of hypertension who presents with predominant symptoms of dyspnea exertion and lightheadedness, dizziness.  She has not had kirby syncope.  She was found to be in 2-1 atrioventricular block with ineffective rhythm at 40 bpm.  She has a right bundle branch block. On 2025 the pt had a [employer]onic MICRA [id number] leadless pacemaker placed.  Options provided:  -- This pt was enrolled in a clinical evidence development research study  -- This pt was not enrolled in a clinical evidence development research study  -- Other - I will add my own diagnosis  -- Refer to Clinical Documentation Reviewer    Query created by: Nathan Thompson on 2025 8:49 AM      Electronically signed by:  MU HOUSE MD 2025 4:44 PM

## (undated) DEVICE — DILATOR, VESSEL, COONS, 20FR X 20CM

## (undated) DEVICE — CATHETER, ELCTROPHYSIOLOGY, DIAGNOSTIC, SUPREME, 4 ELECTRODE, 2-5-2 MM SPACING, JOSEPHSON CURVE, 6 FR X 120 CM

## (undated) DEVICE — SHEATH, INTRODUCER, MICRA, 23FR  X 55.7CM

## (undated) DEVICE — GUIDEWIRE, AMPLATZ, TFE, EXTRA STIFF, CURVED, .032/145CM/3MMTIP

## (undated) DEVICE — PAD, ELECTRODE DEFIB PADPRO ADULT STRL W/ADAPTER

## (undated) DEVICE — SHEATH, PINNACLE, W/.038 GUIDEWIRE, 10 CM,  6FR INTRODUCER, 6FR DIA, 2.5 CM DIALATOR

## (undated) DEVICE — GUIDEWIRE, J TIP, 3 MM, 0.035 IN X 150 CM, PTFE

## (undated) DEVICE — DEVICE, CLOSURE, PERCLOSE, PROSTYLE

## (undated) DEVICE — INTRODUCER, SHEATH, FAST-CATH, 6 FR X 23 CM

## (undated) DEVICE — PACK, ANGIO P2, CUSTOM, LAKE

## (undated) DEVICE — CLOSURE SYSTEM, VASCULAR, MVP 6-12FR, VENOUS